# Patient Record
Sex: MALE | Race: AMERICAN INDIAN OR ALASKA NATIVE
[De-identification: names, ages, dates, MRNs, and addresses within clinical notes are randomized per-mention and may not be internally consistent; named-entity substitution may affect disease eponyms.]

---

## 2017-05-15 ENCOUNTER — HOSPITAL ENCOUNTER (INPATIENT)
Dept: HOSPITAL 42 - ED | Age: 62
LOS: 18 days | Discharge: NURSING, REHAB OR LONG-TERM CARE | DRG: 870 | End: 2017-06-02
Attending: INTERNAL MEDICINE | Admitting: INTERNAL MEDICINE
Payer: MEDICARE

## 2017-05-15 VITALS — BODY MASS INDEX: 44.9 KG/M2

## 2017-05-15 DIAGNOSIS — D68.9: ICD-10-CM

## 2017-05-15 DIAGNOSIS — Z87.442: ICD-10-CM
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
## 2017-05-15 DIAGNOSIS — J98.2: ICD-10-CM

## 2017-05-15 DIAGNOSIS — R26.2: ICD-10-CM

## 2017-05-15 DIAGNOSIS — Z99.11: ICD-10-CM

## 2017-05-15 DIAGNOSIS — Z79.4: ICD-10-CM

## 2017-05-15 DIAGNOSIS — M10.9: ICD-10-CM

## 2017-05-15 DIAGNOSIS — Z88.0: ICD-10-CM

## 2017-05-15 DIAGNOSIS — I85.00: ICD-10-CM

## 2017-05-15 DIAGNOSIS — Z82.49: ICD-10-CM

## 2017-05-15 DIAGNOSIS — R65.21: ICD-10-CM

## 2017-05-15 DIAGNOSIS — B17.9: ICD-10-CM

## 2017-05-15 DIAGNOSIS — I08.1: ICD-10-CM

## 2017-05-15 DIAGNOSIS — Z74.01: ICD-10-CM

## 2017-05-15 DIAGNOSIS — R59.0: ICD-10-CM

## 2017-05-15 DIAGNOSIS — E78.5: ICD-10-CM

## 2017-05-15 DIAGNOSIS — I13.2: ICD-10-CM

## 2017-05-15 DIAGNOSIS — N17.9: ICD-10-CM

## 2017-05-15 DIAGNOSIS — I25.10: ICD-10-CM

## 2017-05-15 DIAGNOSIS — J96.02: ICD-10-CM

## 2017-05-15 DIAGNOSIS — I73.9: ICD-10-CM

## 2017-05-15 DIAGNOSIS — F17.210: ICD-10-CM

## 2017-05-15 DIAGNOSIS — K72.00: ICD-10-CM

## 2017-05-15 DIAGNOSIS — J96.01: ICD-10-CM

## 2017-05-15 DIAGNOSIS — I27.2: ICD-10-CM

## 2017-05-15 DIAGNOSIS — J15.212: ICD-10-CM

## 2017-05-15 DIAGNOSIS — N25.81: ICD-10-CM

## 2017-05-15 DIAGNOSIS — K21.9: ICD-10-CM

## 2017-05-15 DIAGNOSIS — Z79.84: ICD-10-CM

## 2017-05-15 DIAGNOSIS — T70.29XA: ICD-10-CM

## 2017-05-15 DIAGNOSIS — K85.10: ICD-10-CM

## 2017-05-15 DIAGNOSIS — D63.1: ICD-10-CM

## 2017-05-15 DIAGNOSIS — Z99.2: ICD-10-CM

## 2017-05-15 DIAGNOSIS — G89.29: ICD-10-CM

## 2017-05-15 DIAGNOSIS — E83.52: ICD-10-CM

## 2017-05-15 DIAGNOSIS — G93.41: ICD-10-CM

## 2017-05-15 DIAGNOSIS — I50.9: ICD-10-CM

## 2017-05-15 DIAGNOSIS — K59.00: ICD-10-CM

## 2017-05-15 DIAGNOSIS — A41.9: Primary | ICD-10-CM

## 2017-05-15 DIAGNOSIS — J44.0: ICD-10-CM

## 2017-05-15 DIAGNOSIS — R00.1: ICD-10-CM

## 2017-05-15 DIAGNOSIS — E11.22: ICD-10-CM

## 2017-05-15 DIAGNOSIS — N18.6: ICD-10-CM

## 2017-05-15 DIAGNOSIS — T17.990A: ICD-10-CM

## 2017-05-15 DIAGNOSIS — E66.01: ICD-10-CM

## 2017-05-15 DIAGNOSIS — Z95.5: ICD-10-CM

## 2017-05-15 DIAGNOSIS — E11.65: ICD-10-CM

## 2017-05-15 LAB
ADD MANUAL DIFF?: YES
ALBUMIN/GLOB SERPL: 0.7 {RATIO}
ALP SERPL-CCNC: 145 U/L
ALT SERPL-CCNC: 1301 U/L
APTT BLD: 32.8 SECONDS
BILIRUB SERPL-MCNC: 2.1 MG/DL
BUN SERPL-MCNC: 31 MG/DL
CALCIUM SERPL-MCNC: 8.8 MG/DL
CHLORIDE SERPL-SCNC: 95 MMOL/L
CO2 SERPL-SCNC: 31 MMOL/L
COHGB MFR BLD: 5.5 %
ERYTHROCYTE [DISTWIDTH] IN BLOOD BY AUTOMATED COUNT: 17.7 %
GLOBULIN SER-MCNC: 5.1 GM/DL
GLUCOSE SERPL-MCNC: 122 MG/DL
HCO3 BLDA-SCNC: 26.7 MMOL/L
HCT VFR BLD CALC: 28.4 %
HHB: 5.4 %
INR PPP: 1.82
LIPASE SERPL-CCNC: 45 U/L
MCH RBC QN AUTO: 29.5 PG
MCHC RBC AUTO-ENTMCNC: 31.3 G/DL
MCV RBC AUTO: 94 FL
METHGB MFR BLD: 0.8 %
NEUTROPHILS NFR BLD AUTO: 80 %
NEUTS BAND NFR BLD: 5 %
O2 CAP BLDA-SCNC: 11.8 ML/DL
O2 CT BLDA-SCNC: 11.1 ML/DL
PH BLDA: 7.11 [PH]
PLATELET # BLD EST: NORMAL 10*3/UL
PLATELET # BLD: 122 10^3/UL
PMV BLD AUTO: 10.1 FL
PO2 BLDA: 81 MM/HG
POTASSIUM SERPL-SCNC: 5.5 MMOL/L
PROT SERPL-MCNC: 8.9 G/DL
SAO2 % BLDA: 88.3 %
SODIUM SERPL-SCNC: 138 MMOL/L
TROPONIN I SERPL-MCNC: 0.26 NG/ML
WBC # BLD AUTO: 17.1 10^3/UL

## 2017-05-15 NOTE — ED PDOC
Arrival/HPI





- General


Chief Complaint: GI Problem


Time Seen by Provider: 05/15/17 18:15


Historian: Patient





- History of Present Illness


Narrative History of Present Illness (Text): 


05/15/17 18:25


Patient had just finished dialysis and developed nausea and vomiting. He 

reports he has vomited approximately 6 times. No abdominal pain or diarrhea. No 

back pain. He states that he has some weakness and fatigue. No chest pain 

palpitations or dyspnea. No fever or chills. No injury or trauma.





Time/Duration: Prior to Arrival


Symptom Onset: Sudden


Symptom Course: Unchanged


Severity Level: Moderate


Activities at Onset: Rest





Past Medical History





- Provider Review


Nursing Documentation Reviewed: Yes





- Infectious Disease


Hx of Infectious Diseases: None





- Tetanus Immunization


Tetanus Immunization: Unknown





- Cardiac


Hx Cardiac Disorders: Yes


Hx Cardiac Arrhythmia: Yes


Hx Hypertension: Yes





- Pulmonary


Hx Respiratory Disorders: No (smokes cigarettes)


Hx Asthma: No





- Neurological


Hx Neurological Disorder: No





- HEENT


Hx HEENT Disorder: No





- Renal


Hx Renal Disorder: Yes


Hx Dialysis: Yes (MWF)


Type of Dialysis Access: L arm fistula


Date of Last Dialysis Treatment: 05/15/17


Hx Kidney Stones: Yes


Hx Neurogenic Bladder: No


Hx Pyelonephritis: No


Hx Renal Cancer: No


Hx Renal Failure: Yes





- Endocrine/Metabolic


Hx Endocrine Disorders: Yes


Hx Diabetes Mellitus Type 1: Yes





- Hematological/Oncological


Hx Blood Disorders: Yes


Hx Anemia: Yes (BLOOD TRANSFUSION)





- Integumentary


Hx Dermatological Disorder: No





- Musculoskeletal/Rheumatological


Hx Musculoskeletal Disorders: Yes


Hx Falls: Yes


Other/Comment: carpal tunnel





- Gastrointestinal


Hx Gastrointestinal Disorders: Yes


Hx Gastroesophageal Reflux: Yes





- Genitourinary/Gynecological


Hx Genitourinary Disorders: No





- Psychiatric


Hx Psychophysiologic Disorder: No


Hx Substance Use: No





- Past Surgical History


Past Surgical History: Non-Contributing





- Surgical History


Hx Cholecystectomy: No


Hx Coronary Stent: Yes (1 2017)


Hx Gastric Bypass Surgery: No


Hx Hysterectomy: No


Hx Joint Replacement: No


Hx Kidney Transplant: No


Hx Liver Transplant: No


Hx Mastectomy: No


Hx Musculoskeletal Surgery: No


Hx Open Heart Surgery: No


Hx Orthopedic Surgery: No


Hx Splenectomy: No


Hx Valve Replacement: No


Other/Comment: Dialysis shunt placement in left arm.





- Anesthesia


Hx Anesthesia: Yes


Hx Anesthesia Reactions: No


Hx Malignant Hyperthermia: No





- Suicidal Assessment


Feels Threatened In Home Enviroment: No





Family/Social History





- Physician Review


Nursing Documentation Reviewed: Yes


Family/Social History: No Known Family HX, Unknown Family HX


Smoking Status: Light Smoker < 10 Cigarettes Daily


Hx Alcohol Use: No


Hx Substance Use: No


Hx Substance Use Treatment: No





Allergies/Home Meds


Allergies/Adverse Reactions: 


Allergies





Penicillins Allergy (Verified 05/15/17 18:09)


 SWELLING








Home Medications: 


 Home Meds











 Medication  Instructions  Recorded  Confirmed


 


Enalapril Maleate [Vasotec] 20 mg PO BID 07/16/16 05/15/17


 


Mv,Devang,Min/Iron/Folic Acid/Lut 1 tab PO DAILY 07/16/16 05/15/17





[Complete Multi Tablet]   


 


Pantoprazole [Protonix EC Tab] 40 mg PO BID 07/16/16 05/15/17


 


amLODIPine [Norvasc] 10 mg PO DAILY 07/16/16 05/15/17


 


Albuterol HFA [Ventolin HFA 90 90 mcg INH PRN PRN 07/28/16 05/15/17





mcg/actuation (8 g)]   


 


Colesevelam HCl [Welchol] 27 mg PO DAILY 07/28/16 05/15/17


 


Ferric Citrate [Auryxia] 210 mg PO TID 07/28/16 05/15/17


 


Cinacalcet [Sensipar] 30 mg PO DAILY 04/21/17 05/15/17


 


Ergocalciferol (Vitamin D2) 50,000 unit PO QD7 PRN 04/21/17 05/15/17





[Vitamin D2]   


 


Insulin Glargine, Recombina 2 units SUBCUT ACBD PRN 04/21/17 05/15/17





[Lantus]   














Review of Systems





- Physician Review


All systems were reviewed & negative as marked: Yes





- Review of Systems


Constitutional: Fatigue.  absent: Fevers


Respiratory: absent: SOB, Cough, Wheezing


Cardiovascular: absent: Chest Pain, Palpitations, Syncope


Gastrointestinal: Nausea, Vomiting.  absent: Abdominal Pain, Constipation, 

Diarrhea


Genitourinary Male: Other (patient does  make some minimal urine)


Neurological: Normal





Physical Exam


Vital Signs











  Pulse Resp BP Pulse Ox


 


 05/15/17 20:49  84  18  118/65  96


 


 05/15/17 17:52  88  18  121/61  96











Appearance: Positive for: Other (morbidly obese)





- Systems Exam


Head: Present: Atraumatic, Normocephalic


Pupils: Present: PERRL


Extroacular Muscles: Present: EOMI


Conjunctiva: Present: Normal


Mouth: Present: Moist Mucous Membranes


Pharnyx: No: ERYTHEMA, EXUDATE, TONSILS ENLARGED


Neck: Present: Normal Range of Motion


Respiratory/Chest: Present: Clear to Auscultation, Good Air Exchange, Decreased 

Breath Sounds.  No: Respiratory Distress, Accessory Muscle Use


Cardiovascular: Present: Regular Rate and Rhythm, Normal S1, S2.  No: Murmurs


Abdomen: Present: Normal Bowel Sounds.  No: Tenderness, Distention, Peritoneal 

Signs, Rebound, Guarding


Back: Present: Normal Inspection.  No: CVA Tenderness, Midline Tenderness, 

Paraspinal Tenderness


Upper Extremity: Present: Normal Inspection.  No: Cyanosis, Edema


Lower Extremity: Present: Normal Inspection.  No: Edema, CALF TENDERNESS


Neurological: Present: GCS=15, CN II-XII Intact, Speech Normal, Motor Func 

Grossly Intact


Skin: Present: Warm, Dry, Normal Color.  No: Rashes


Psychiatric: Present: Alert, Oriented x 3, Normal Insight, Normal Concentration





Medical Decision Making


ED Course and Treatment: 


05/15/17 18:32


Impression:


A 61 year old male with nausea and vomiting after dialysis treatment.





Plan:


-- EKG


-- chest xray


-- labs


-- Urinalysis


-- Zofran


-- Reassess and disposition





Prior Visits:


Notes and results from previous visits were reviewed.


Patient last reported to emergency department on 4/21/17 for evaluation of low 

hemoglobin count and generalized weakness. Patient was discharged.  





Progress Notes:





05/15/17 20:24


EKG shows normal sinus rhythm rate approximately 85 with no acute ST or T-wave 

changes


05/15/17 20:25


Venous blood draw requiring M.D. skill from a right femoral vein stick on first 

attempt sterilely


05/15/17 20:26


Patient has had no vomiting while in the emergency department


05/15/17 21:34


Discussed in detail with . She will admit to telemetry. She requested 

the patient be covered with vancomycin and Flagyl. She requests a consult with 

 who has been called. Ultrasound of the abdomen has been ordered, 

but the tech has left for the evening and will not be called back in. 

Ultrasound will be done first thing in the morning.





- Lab Interpretations


Lab Results: 








 05/15/17 20:20 





 05/15/17 20:20 





 Lab Results





05/15/17 20:20: Sodium 138, Potassium 5.5 H, Chloride 95 L, Carbon Dioxide 31, 

Anion Gap 18, BUN 31 H, Creatinine 5.4 H, Est GFR ( Amer) 13, Est GFR (

Non-Af Amer) 11, Random Glucose 122 H, Calcium 8.8, Total Bilirubin 2.1 H, AST 

> 1500 H, ALT 1301 H, Alkaline Phosphatase 145 H, Lactate Dehydrogenase 40193 H

, Total Creatine Kinase 304 H, CK-MB (CK-2) 3.4, CK-MB (CK-2) % Cancelled, 

Troponin I 0.26 H* D, Total Protein 8.9 H, Albumin 3.8, Globulin 5.1, Albumin/

Globulin Ratio 0.7 L, Lipase 45


05/15/17 20:20: PT 19.7 H, INR 1.82 H, APTT 32.8 H


05/15/17 20:20: WBC 17.1 H D, RBC 3.02 L, Hgb 8.9 L, Hct 28.4 L, MCV 94.0, MCH 

29.5, MCHC 31.3, RDW 17.7 H, Plt Count 122, MPV 10.1, Neutrophils % (Manual) 

Pending, Lymphocytes % (Manual) Pending, Monocytes % (Manual) Pending








I have reviewed the lab results: Yes





- RAD Interpretation


Radiology Orders: 








05/15/17 18:23


CHEST PORTABLE [RAD] Stat 





05/15/17 21:22


ABDOMEN COMPLETE [US] Stat 














- EKG Interpretation


Interpreted by ED Physician: Yes


Type: 12 lead EKG





- Medication Orders


Current Medication Orders: 








Vancomycin HCl (Vancomycin 1gm)  1 gm in 250 mls @ 167 mls/hr IVPB STAT STA


   PRN Reason: Protocol


   Stop: 05/15/17 23:01





Discontinued Medications





Ondansetron HCl (Zofran Inj)  4 mg IVP STAT STA


   Stop: 05/15/17 18:23


   Last Admin: 05/15/17 21:12  Dose:  





Ondansetron HCl (Zofran Odt)  4 mg PO STAT STA


   Stop: 05/15/17 20:27


   Last Admin: 05/15/17 21:12  Dose: 4 mg











- Scribe Statement


The provider has reviewed the documentation as recorded by the Cayden Miles





Provider Scribe Attestation:


All medical record entries made by the Scribe were at my direction and 

personally dictated by me. I have reviewed the chart and agree that the record 

accurately reflects my personal performance of the history, physical exam, 

medical decision making, and the department course for this patient. I have 

also personally directed, reviewed, and agree with the discharge instructions 

and disposition.











Disposition/Present on Arrival





- Present on Arrival


Any Indicators Present on Arrival: No


History of DVT/PE: Yes


History of Uncontrolled Diabetes: Yes


Urinary Catheter: No


History of Decub. Ulcer: No


History Surgical Site Infection Following: None





- Disposition


Have Diagnosis and Disposition been Completed?: Yes


Diagnosis: 


 Elevated troponin, ESRD (end stage renal disease), Obesity, Nausea and vomiting

, Elevated liver enzymes





Disposition: HOSPITALIZED


Disposition Time: 21:36


Patient Plan: Admission, Telemetry


Condition: FAIR


Referrals: 


Lavonne Maria MD [Primary Care Provider] - Follow up with primary

## 2017-05-16 LAB
ABG MECHANICAL RATE: 30
ADD MANUAL DIFF?: NO
ADD MANUAL DIFF?: NO
ALBUMIN/GLOB SERPL: 0.7 {RATIO}
ALBUMIN/GLOB SERPL: 0.8 {RATIO}
ALBUMIN/GLOB SERPL: 0.8 {RATIO}
ALP SERPL-CCNC: 128 U/L
ALP SERPL-CCNC: 149 U/L
ALP SERPL-CCNC: 150 U/L
ALT SERPL-CCNC: 3914 U/L
AMORPH SED URNS QL MICRO: (no result)
APPEARANCE UR: CLEAR
ATERIAL BLOOD GAS PEEP: 10
BACTERIA #/AREA URNS HPF: (no result) /[HPF]
BASE EXCESS BLDV CALC-SCNC: 3 MMOL/L
BASOPHILS # BLD AUTO: 0.01 K/MM3
BASOPHILS # BLD AUTO: 0.02 K/MM3
BASOPHILS NFR BLD: 0.1 %
BASOPHILS NFR BLD: 0.1 %
BILIRUB SERPL-MCNC: 1.7 MG/DL
BILIRUB SERPL-MCNC: 2.1 MG/DL
BILIRUB SERPL-MCNC: 3.5 MG/DL
BILIRUB UR-MCNC: (no result) MG/DL
BUN SERPL-MCNC: 33 MG/DL
BUN SERPL-MCNC: 43 MG/DL
BUN SERPL-MCNC: 45 MG/DL
CALCIUM SERPL-MCNC: 8.5 MG/DL
CALCIUM SERPL-MCNC: 8.7 MG/DL
CALCIUM SERPL-MCNC: 8.7 MG/DL
CHLORIDE SERPL-SCNC: 93 MMOL/L
CHLORIDE SERPL-SCNC: 95 MMOL/L
CHLORIDE SERPL-SCNC: 96 MMOL/L
CO2 SERPL-SCNC: 27 MMOL/L
CO2 SERPL-SCNC: 28 MMOL/L
CO2 SERPL-SCNC: 28 MMOL/L
COHGB MFR BLD: 2.6 %
COHGB MFR BLD: 4 %
COLOR UR: YELLOW
EOSINOPHIL # BLD: 0 10*3/UL
EOSINOPHIL # BLD: 0 10*3/UL
EOSINOPHIL NFR BLD: 0 %
EOSINOPHIL NFR BLD: 0.1 %
ERYTHROCYTE [DISTWIDTH] IN BLOOD BY AUTOMATED COUNT: 17.5 %
ERYTHROCYTE [DISTWIDTH] IN BLOOD BY AUTOMATED COUNT: 17.5 %
ERYTHROCYTE [DISTWIDTH] IN BLOOD BY AUTOMATED COUNT: 17.6 %
GLOBULIN SER-MCNC: 4.6 GM/DL
GLOBULIN SER-MCNC: 4.8 GM/DL
GLOBULIN SER-MCNC: 5 GM/DL
GLUCOSE SERPL-MCNC: 147 MG/DL
GLUCOSE SERPL-MCNC: 159 MG/DL
GLUCOSE SERPL-MCNC: 182 MG/DL
GLUCOSE UR STRIP-MCNC: NEGATIVE MG/DL
GRANULOCYTES # BLD: 15.63 10*3/UL
GRANULOCYTES # BLD: 16.86 10*3/UL
GRANULOCYTES NFR BLD: 85 %
GRANULOCYTES NFR BLD: 88.6 %
HCO3 BLDA-SCNC: 26.7 MMOL/L
HCO3 BLDA-SCNC: 27.2 MMOL/L
HCO3 BLDA-SCNC: 28.5 MMOL/L
HCT VFR BLD CALC: 26.4 %
HCT VFR BLD CALC: 27.5 %
HCT VFR BLD CALC: 27.8 %
HHB: 0.3 %
HHB: 5.2 %
INR PPP: 1.94
KETONES UR STRIP-MCNC: NEGATIVE MG/DL
LEUKOCYTE ESTERASE UR-ACNC: (no result) LEU/UL
LYMPHOCYTES # BLD: 1.6 10*3/UL
LYMPHOCYTES # BLD: 1.8 10*3/UL
LYMPHOCYTES NFR BLD AUTO: 8.2 %
LYMPHOCYTES NFR BLD AUTO: 9.6 %
MAGNESIUM SERPL-MCNC: 2 MG/DL
MCH RBC QN AUTO: 29.6 PG
MCH RBC QN AUTO: 29.8 PG
MCH RBC QN AUTO: 29.9 PG
MCHC RBC AUTO-ENTMCNC: 31.8 G/DL
MCHC RBC AUTO-ENTMCNC: 32 G/DL
MCHC RBC AUTO-ENTMCNC: 32 G/DL
MCV RBC AUTO: 92.6 FL
MCV RBC AUTO: 93 FL
MCV RBC AUTO: 94 FL
METHGB MFR BLD: 0.9 %
METHGB MFR BLD: 1.5 %
MONOCYTES # BLD AUTO: 0.6 10*3/UL
MONOCYTES # BLD AUTO: 1 10*3/UL
MONOCYTES NFR BLD: 3.1 %
MONOCYTES NFR BLD: 5.2 %
O2 CAP BLDA-SCNC: 11.9 ML/DL
O2 CAP BLDA-SCNC: 13.1 ML/DL
O2 CT BLDA-SCNC: 11.2 ML/DL
O2 CT BLDA-SCNC: 13.1 ML/DL
PH BLDA: 7.16 [PH]
PH BLDA: 7.3 [PH]
PH BLDA: 7.39 [PH]
PH BLDV: 7.4 [PH]
PH UR STRIP: 5.5 [PH]
PHOSPHATE SERPL-MCNC: 6.1 MG/DL
PLATELET # BLD: 120 10^3/UL
PLATELET # BLD: 122 10^3/UL
PLATELET # BLD: 137 10^3/UL
PMV BLD AUTO: 9.4 FL
PMV BLD AUTO: 9.5 FL
PMV BLD AUTO: 9.6 FL
PO2 BLDA: 234 MM/HG
PO2 BLDA: 76 MM/HG
PO2 BLDA: 85 MM/HG
POTASSIUM SERPL-SCNC: 4.5 MMOL/L
POTASSIUM SERPL-SCNC: 5.1 MMOL/L
POTASSIUM SERPL-SCNC: 5.2 MMOL/L
PROT SERPL-MCNC: 8.2 G/DL
PROT SERPL-MCNC: 8.3 G/DL
PROT SERPL-MCNC: 8.9 G/DL
PROT UR STRIP-MCNC: >=300 MG/DL
RBC # UR STRIP: (no result) /UL
RBC #/AREA URNS HPF: (no result) /HPF
SAO2 % BLDA: 89.9 %
SAO2 % BLDA: 95.6 %
SODIUM SERPL-SCNC: 135 MMOL/L
SODIUM SERPL-SCNC: 137 MMOL/L
SODIUM SERPL-SCNC: 139 MMOL/L
SP GR UR STRIP: 1.02
TROPONIN I SERPL-MCNC: 0.75 NG/ML
UROBILINOGEN UR STRIP-ACNC: 0.2 E.U./DL
WBC # BLD AUTO: 18.4 10^3/UL
WBC # BLD AUTO: 19 10^3/UL
WBC # BLD AUTO: 20.5 10^3/UL

## 2017-05-16 PROCEDURE — 0BH17EZ INSERTION OF ENDOTRACHEAL AIRWAY INTO TRACHEA, VIA NATURAL OR ARTIFICIAL OPENING: ICD-10-PCS | Performed by: INTERNAL MEDICINE

## 2017-05-16 PROCEDURE — 5A1955Z RESPIRATORY VENTILATION, GREATER THAN 96 CONSECUTIVE HOURS: ICD-10-PCS | Performed by: INTERNAL MEDICINE

## 2017-05-16 PROCEDURE — 3E0F7GC INTRODUCTION OF OTHER THERAPEUTIC SUBSTANCE INTO RESPIRATORY TRACT, VIA NATURAL OR ARTIFICIAL OPENING: ICD-10-PCS | Performed by: INTERNAL MEDICINE

## 2017-05-16 NOTE — CP.PCM.CON
<Shin Mohamud - Last Filed: 05/16/17 01:07>





History of Present Illness





- History of Present Illness


History of Present Illness: 





60 y/o M with PMH of HTN, IDDM,, ESRD on dialysis, and HLD presents to the ED 

for nausea and vomiting for the past day. History was provided by previous 

records, friend at bedside, and significant other via telephone as the patient 

was too lethargic to effectively communicate. According to significant other, 

pt had been experiencing flu-like symptoms over the past week. He recently went 

to see his PMD and received a Z-pack which he started 3 days ago. Over the 

weekend, pt went to [facility]. This morning, he began experiencing nonbloody, 

nonbilious episodes of vomiting. Pt began to develop fever and chills, along 

with lethargy. At this point, an ambulance was called for the patient. At 

bedside, pt is accompanied by his friend who also mentions that pt underwent 

dialysis and had between 4-5 liters removed. 





PMH: HTN, IDDM,, ESRD on dialysis, and HLD


Surgical Hx: Carpal tunnel release, AV fistula


Social Hx: Former tobacco use, 1/2 ppd. Occasional alcohol use, no illicit drug 

use


Allergies: Penicillin


Medications: See MAR





Review of Systems





- Review of Systems


Systems not reviewed;Unavailable: Altered Mental Status





Past Patient History





- Infectious Disease


Hx of Infectious Diseases: None





- Tetanus Immunizations


Tetanus Immunization: Unknown





- Past Social History


Smoking Status: Light Smoker < 10 Cigarettes Daily





- CARDIAC


Hx Cardiac Disorders: Yes


Hx Cardia Arrhythmia: Yes


Hx Hypertension: Yes





- PULMONARY


Hx Respiratory Disorders: No (smokes cigarettes)


Hx Asthma: No





- NEUROLOGICAL


Hx Neurological Disorder: No





- HEENT


Hx HEENT Problems: No





- RENAL


Hx Chronic Kidney Disease: Yes


Hx Dialysis: Yes (MWF)


Type of Dialysis Access: L arm fistula


Date of Last Dialysis Treatment: 05/15/17


Hx Kidney Stones: Yes


Hx Neurogenic Bladder: No


Hx Pyelonephritis: No


Hx Renal (Kidney) Cancer: No


Hx Renal Failure: Yes





- ENDOCRINE/METABOLIC


Hx Endocrine Disorders: Yes


Hx Diabetes Mellitus Type 1: Yes





- HEMATOLOGICAL/ONCOLOGICAL


Hx Blood Disorders: Yes


Hx Anemia: Yes (BLOOD TRANSFUSION)





- INTEGUMENTARY


Hx Dermatological Problems: No





- MUSCULOSKELETAL/RHEUMATOLOGICAL


Hx Musculoskeletal Disorders: Yes


Hx Falls: Yes


Other/Comment: carpal tunnel





- GASTROINTESTINAL


Hx Gastrointestinal Disorders: Yes


Hx Gastroesophageal Reflux: Yes





- GENITOURINARY/GYNECOLOGICAL


Hx Genitourinary Disorders: No





- PSYCHIATRIC


Hx Psychophysiologic Disorder: No


Hx Substance Use: No





- SURGICAL HISTORY


Hx Cholecystectomy: No


Hx Coronary Stent: Yes (1 2017)


Hx Gastric Bypass Surgery: No


Hx Hysterectomy: No


Hx Joint Replacement: No


Hx Kidney Transplant: No


Hx Liver Transplant: No


Hx Mastectomy: No


Hx Musculoskeletal Surgery: No


Hx Open Heart Surgery: No


Hx Orthopedic Surgery: No


Hx Splenectomy: No


Hx Valve Replacement: No


Other/Comment: Dialysis shunt placement in left arm.





- ANESTHESIA


Hx Anesthesia: Yes


Hx Anesthesia Reactions: No


Hx Malignant Hyperthermia: No





Meds


Allergies/Adverse Reactions: 


 Allergies











Allergy/AdvReac Type Severity Reaction Status Date / Time


 


Penicillins Allergy  SWELLING Verified 05/15/17 18:09














- Medications


Medications: 


 Current Medications





Acetaminophen (Tylenol 325mg Tab)  650 mg PO Q6H PRN


   PRN Reason: Fever >100.4 F


Albuterol/Ipratropium (Duoneb 3 Mg/0.5 Mg (3 Ml) Ud)  3 ml IH Q2H PRN


   PRN Reason: Shortness of Breath


Albuterol/Ipratropium (Duoneb 3 Mg/0.5 Mg (3 Ml) Ud)  3 ml IH [id number] OSEAS


Furosemide (Lasix)  40 mg IVP STAT STA


   Stop: 05/16/17 00:34


Hydralazine HCl (Apresoline)  10 mg IVP Q6 OSEAS


Metronidazole (Flagyl)  500 mg in 100 mls @ 100 mls/hr IVPB Q8 OSEAS


   PRN Reason: Protocol


Levofloxacin/Dextrose (Levaquin 750mg)  750 mg in 150 mls @ 100 mls/hr IVPB 

STAT STA


   Stop: 05/16/17 01:43


Meropenem 250 mg/ Sodium (Chloride)  100 mls @ 100 mls/hr IVPB Q12H OSEAS


   PRN Reason: Protocol


   Stop: 05/16/17 01:14


Insulin Human Regular (Humulin R Med)  0 units SC ACHS OSEAS


   PRN Reason: Protocol


Ondansetron HCl (Zofran Inj)  4 mg IVP Q6H PRN


   PRN Reason: Nausea/Vomiting


Pantoprazole Sodium (Protonix Inj)  40 mg IVP DAILY OSEAS











Physical Exam





- Constitutional


Appears: Toxic, In Acute Distress





- Head Exam


Head Exam: ATRAUMATIC, NORMAL INSPECTION, NORMOCEPHALIC





- Eye Exam


Eye Exam: EOMI, Normal appearance





- ENT Exam


ENT Exam: Mucous Membranes Dry





- Neck Exam


Neck exam: Positive for: Normal Inspection.  Negative for: Lymphadenopathy





- Respiratory Exam


Respiratory Exam: Decreased Breath Sounds, Respiratory Distress.  absent: 

Wheezes





- Cardiovascular Exam


Cardiovascular Exam: RRR, +S1, +S2





- GI/Abdominal Exam


GI & Abdominal Exam: Normal Bowel Sounds, Soft.  absent: Tenderness





- Extremities Exam


Extremities exam: Positive for: pedal edema (+1 edema b/l).  Negative for: calf 

tenderness





- Neurological Exam


Neurological exam: Alert, CN II-XII Intact, Oriented x3





- Psychiatric Exam


Psychiatric exam: Normal Affect, Normal Mood





- Skin


Skin Exam: Dry, Intact, Warm





Results





- Vital Signs


Recent Vital Signs: 


 Last Vital Signs











Temp  99.4 F   05/15/17 22:40


 


Pulse  86   05/16/17 00:15


 


Resp  15   05/16/17 00:15


 


BP  150/70   05/16/17 00:15


 


Pulse Ox  92 L  05/16/17 00:15














- Labs


Result Diagrams: 


 05/15/17 20:20





 05/15/17 20:20


Labs: 


 Laboratory Results - last 24 hr











  05/15/17 05/15/17





  22:30 23:15


 


pCO2   84 H*


 


pO2   81.0


 


HCO3   26.7


 


ABG pH   7.11 L*


 


ABG Total CO2   29.3 H


 


ABG O2 Saturation   94.2 L


 


ABG O2 Content   11.1 L


 


ABG Base Excess   -3.5 L


 


ABG Hemoglobin   8.8 L


 


ABG Carboxyhemoglobin   5.5 H


 


POC ABG HHb (Measured)   5.4 H


 


ABG Methemoglobin   0.8


 


ABG O2 Capacity   11.8 L


 


Hgb O2 Saturation   88.3 L


 


FiO2   100.0


 


Troponin I  0.35 H* D 














Assessment & Plan





- Assessment and Plan (Free Text)


Plan: 





60 y/o M with PMH of HTN, IDDM,, ESRD on dialysis, and HLD presents with 

hypercapnic, hypoxemic respiratory failure in setting of multiorgan dysfunction 

syndrome. Pt is found to have multiple lab abnormalities including elevated 

troponins, elevated LFTs, and chronic anemia. Pt was intubated in the ED due to 

increased lethargy and inability to protect his airway. Pt will be admitted to 

the ICU. 





Neuro: 


Obtunded, intubated


Ammonia levels


Will monitor neuro status for acute worsening





Cardio:


Will obtain BNP


Trend troponins, elevated at this time likely secondary to ESRD


Lasix 40 mg IV x 1 given to help with potential fluid overload


Hydralazine prn for BP, Goal SBP <180


Recent cardiac cath shown to have EF of 65%, nonocclusive


Maintain MAP >65


Cardiology consult, Dr. Thompson





Pulm:


Hypercapnic, hypoxemic respiratory failure


Intubated, PRVC


Will obtain repeat CXR after intubation


Will obtain ABG after intubation


Duonebs scheduled and prn


Solumedrol 40 mg IV q12


Will cover CAP with 1 dose of levaquin and Merrem scheduled


Will obtain CT chest to evaluate for possible infiltrate 


Maintain O2 sat >92%





GI:


Will obtain noncontrast CT of abdomen


Increased LFTs, possible shock liver 


Flagyl, Merrem, and 1 dose of levaquin


Protonix


GI consult, Dr. Morales





Nephro:


Received dialysis today


Hx of ESRD, creatinine near baseline


Replenish electrolytes as needed


Maintain euvolemia 


Consider nephrology consult





Heme/ID:


Multiorgan dysfunction syndrome


Afebrile, Leukocytosis


CBC daily


Blood and urine cultures pending


Continue abx


Maintain normothermia





Endo: 


ISS


Fingersticks q4h


TSH





PPX:


Protonix


Heparin





Seen, reviewed, and discussed with attending





Cade, PGY-1








<Cedric Beard Q - Last Filed: 05/16/17 07:00>





Meds





- Medications


Medications: 


 Current Medications





Acetaminophen (Tylenol 325mg Tab)  650 mg PO Q6H PRN


   PRN Reason: Fever >100.4 F


Albuterol/Ipratropium (Duoneb 3 Mg/0.5 Mg (3 Ml) Ud)  3 ml IH Q2H PRN


   PRN Reason: Shortness of Breath


Albuterol/Ipratropium (Duoneb 3 Mg/0.5 Mg (3 Ml) Ud)  3 ml IH [id number] OSEAS


Heparin Sodium (Porcine) (Heparin)  5,000 units SC Q12 OSEAS


   PRN Reason: Protocol


Hydralazine HCl (Apresoline)  10 mg IVP Q6 PRN


   PRN Reason: SBP >180


Metronidazole (Flagyl)  500 mg in 100 mls @ 100 mls/hr IVPB Q8 OSEAS


   PRN Reason: Protocol


Propofol (Diprivan)  1,000 mg in 100 mls @ 3.674 mls/hr IV .Q24H PRN; Protocol; 

5 MCG/KG/MIN


   PRN Reason: TITRATE PER MD ORDER


   Last Titration: 05/16/17 03:35 Dose:  20 mcg/kg/min, 14.696 mls/hr


Meropenem 500 mg/ Sodium (Chloride)  100 mls @ 100 mls/hr IVPB Q12 [facility]


   Stop: 05/23/17 06:23


Insulin Human Regular (Humulin R Med)  0 units SC ACHS OSEAS


   PRN Reason: Protocol


Methylprednisolone (Solu-Medrol)  40 mg IVP Q12 [facility]


   Last Admin: 05/16/17 06:49 Dose:  40 mg


Ondansetron HCl (Zofran Inj)  4 mg IVP Q6H PRN


   PRN Reason: Nausea/Vomiting


Pantoprazole Sodium (Protonix Inj)  40 mg IVP DAILY [facility]











Results





- Vital Signs


Recent Vital Signs: 


 Last Vital Signs











Temp  99.4 F   05/15/17 22:40


 


Pulse  77   05/16/17 05:08


 


Resp  14   05/16/17 05:08


 


BP  159/76 H  05/16/17 05:08


 


Pulse Ox  100   05/16/17 05:08














- Labs


Result Diagrams: 


 05/15/17 20:20





 05/15/17 20:20


Labs: 


 Laboratory Results - last 24 hr











  05/15/17 05/15/17 05/16/17





  22:30 23:15 01:42


 


pCO2   84 H* 


 


pO2   81.0 


 


HCO3   26.7 


 


ABG pH   7.11 L* 


 


ABG Total CO2   29.3 H 


 


ABG O2 Saturation   94.2 L 


 


ABG O2 Content   11.1 L 


 


ABG Base Excess   -3.5 L 


 


ABG Hemoglobin   8.8 L 


 


ABG Carboxyhemoglobin   5.5 H 


 


POC ABG HHb (Measured)   5.4 H 


 


ABG Methemoglobin   0.8 


 


ABG O2 Capacity   11.8 L 


 


Hgb O2 Saturation   88.3 L 


 


FiO2   100.0 


 


Ammonia    63 H


 


Troponin I  0.35 H* D  


 


Influenza Typ A,B (EIA)   














  05/16/17 05/16/17





  03:16 04:11


 


pCO2  75 H* 


 


pO2  76.0 L 


 


HCO3  26.7 


 


ABG pH  7.16 L* 


 


ABG Total CO2  29.0 H 


 


ABG O2 Saturation  94.5 L 


 


ABG O2 Content  11.2 L 


 


ABG Base Excess  -2.6 L 


 


ABG Hemoglobin  8.8 L 


 


ABG Carboxyhemoglobin  4.0 H 


 


POC ABG HHb (Measured)  5.2 H 


 


ABG Methemoglobin  0.9 


 


ABG O2 Capacity  11.9 L 


 


Hgb O2 Saturation  89.9 L 


 


FiO2  100.0 


 


Ammonia  


 


Troponin I  


 


Influenza Typ A,B (EIA)   Negative for flu a/b














Attending/Attestation





- Attestation


I have personally seen and examined this patient.: Yes


I have fully participated in the care of the patient.: Yes


I have reviewed all pertinent clinical information: Yes


Notes (Text): 





05/16/17 06:56


I agree with the above mentioned note by Dr. Mohamud with the following 

additions/exceptions noted:


60 y/o male with PMHx as described above came to the ED after experiencing cold-

like symptoms over the past week.  He used a zpack for about 3-4 days without 

any improvement.  Over the past 1 day, as per his girlfriend, the patient began 

having multiple bouts of NBNB emesis, before and continuing after HD.  Patient 

came to the ED and was obtunded; not a candidate for NIPPV due to his altered 

mentation and intractable vomiting.  He was admitted to the ICU for further 

care and monitoring and remained in the ED overnight due to a lack of beds/

nurses in the ICU.


Case discussed with Dr. Berry who intubated the patient after it was handed 

over to him by an earlier ED Physician


labs and images reviewed personally


total time of care: 45 minutes

## 2017-05-16 NOTE — CT
PROCEDURE:  CT HEAD WITHOUT CONTRAST.



HISTORY:

altered mental status



COMPARISON:

None available. 



TECHNIQUE:

Axial computed tomography images were obtained through the head/brain 

without intravenous contrast.  



Radiation dose:



Total exam DLP = 789.92 mGy-cm.



This CT exam was performed using one or more of the following dose 

reduction techniques: Automated exposure control, adjustment of the 

mA and/or kV according to patient size, and/or use of iterative 

reconstruction technique.



FINDINGS:



HEMORRHAGE:

No intracranial hemorrhage. 



BRAIN:

No mass effect or edema.  No atrophy or chronic microvascular 

ischemic changes.



VENTRICLES:

Unremarkable. No hydrocephalus. 



CALVARIUM:

Unremarkable.



PARANASAL SINUSES:

Chronic sphenoid, ethmoid and bilateral maxillary sinusitis. 

Dependent fluid with air-fluid level noted in sphenoid and right 

frontal sinus. Possible acute sinusitis. Please correlate. 



MASTOID AIR CELLS:

Unremarkable as visualized. No inflammatory changes.



OTHER FINDINGS:

None.



IMPRESSION:

No intracranial mass, hemorrhage or evidence of acute infarct.  

Chronic paranasal sinusitis.  Dependent fluid in sphenoid and right 

frontal sinus.  Rule out acute sinusitis.

## 2017-05-16 NOTE — PN
DATE: 05/16/2017



The patient is a 61-year-old.  Last night events noted.  After patient was admitted, initially he bec
neno more confused and disoriented.  He was found to be in CO2 narcosis.  He was then intubated.  He h
ad CT scan of the abdomen and pelvis done that showed extensive bilateral pulmonary infiltrate, cardi
omegaly and mediastinal lymphadenopathy.  He was also found to have cholelithiasis, but no evidence o
f cholecystitis.  The patient was admitted in ICU.  When I saw patient this morning, he was intubated
 and sedated.  Case discussed with the intensivist and patient's nurse, _____, taking care of him.



PHYSICAL EXAMINATION:

GENERAL:  He is on a vent and sedated.

VITAL SIGNS:  He is afebrile, pulse 91, blood pressure 150/60.

LUNGS:  Bilateral soft crackle.

HEART:  S1, S2 audible.

ABDOMEN:  Soft, obese, nontender, no rebound, no guarding.

NEUROLOGIC:  The patient is sedated on vent.



LABORATORY EXAMINATION:  WBCs 19.0, hemoglobin 8.9, hematocrit 27.8, platelets 120.  His PT is 20.9, 
INR 1.94.  Chemistry:  Sodium 137, potassium 5.1, chloride 96, CO2 27, BUN 43, creatinine 6.6, blood 
sugar of 159.  His total bilirubin is 2.1, his AST is 7037 and ALT 3903, alk phos is normal.  His tro
ponin is 0.61.



X-ray chest:  Improved bilateral infiltrate.



ASSESSMENT:

1.  Sepsis.

2.  Hepatic failure.

3.  End-stage renal disease, on hemodialysis.

4.  Bilateral alveolar infiltrate.

5.  Cholelithiasis.



PLAN:  Currently, patient is on IV antibiotics including metronidazole and meropenem.  He was given d
ose of vancomycin.  We will continue him on PPI.  Discussed with Dr. Chandra, who will discuss with 
the patient's family.  We are trying to transfer him to tertiary care unit and that could be [facility] or 
[facility].  Once a bed is available, he will be transferred _____.





__________________________________________

Antonieta Rae MD







cc:



DD: 05/16/2017 12:26:40  [id number]

TT: 05/16/2017 15:16:03

Confirmation # [id number]

Dictation # [id number]

en

## 2017-05-16 NOTE — RAD
HISTORY:

vomiting  



COMPARISON:

No prior.



FINDINGS:



BOWEL:

Normal. No obstruction. No free air. Inferior vena caval filter noted.



BONES:

Normal.



OTHER FINDINGS:

None.



IMPRESSION:

No active disease.

## 2017-05-16 NOTE — RAD
HISTORY:

cp  



COMPARISON:

05/15/2017 



FINDINGS:



LUNGS:

The endotracheal tube is just above the quincy.



There is a new bilateral alveolar infiltrate. This most likely 

represents pulmonary edema.



PLEURA:

No significant pleural effusion identified, no pneumothorax apparent.



CARDIOVASCULAR:

Normal.



OSSEOUS STRUCTURES:

No significant abnormalities.



VISUALIZED UPPER ABDOMEN:

Normal.



OTHER FINDINGS:

None.



IMPRESSION:

New bilateral alveolar infiltrates probable pulmonary edema

## 2017-05-16 NOTE — CON
DATE: 05/16/2017



The patient admitted for Dr. Rae.



REFERRING PHYSICIAN:  Dr. Rae.



REASON FOR CONSULTATION:  To provide dialysis services for a patient admitted with acute fulminant he
patitis and respiratory failure, now intubated in the CCU.



HISTORY OF PRESENT ILLNESS:  The patient is a 61-year-old black male with a history of end-stage dejon
l disease on chronic maintenance hemodialysis Monday, Wednesday, Friday in [facility], hist
ory of NIDDM, hypertension, history of nonobstructive coronary artery disease, status post cardiac ca
theterization 1/2017, history of LVH, history of valvular heart disease, MR/TR, history of gastroesop
hageal reflux disease, history of anemia secondary to chronic kidney disease, history of secondary hy
perparathyroidism, history of nephrolithiasis, history of obesity and history of gout.  The patient p
resented to the Emergency Room with nausea, vomiting, and abdominal pain.  En route to CAT scan, the 
patient developed acute respiratory distress and was intubated.  The patient is currently seen in CCU
, bed 6.  He is sedated on Diprivan.  He is on a ventilator.  He has acute fulminant hepatitis.  His 
chest imaging study showed bilateral infiltrates.  It is uncertain whether he is in CHF or he has PRATIK
S.  Workup of his elevated LFTs are in process.  The patient will likely be transferred to [facility] for further evaluation.  The patient's last dialysis was yesterday.  Plan is to dialyze ashley
ent today prior to transfer to [facility].



PAST MEDICAL HISTORY:  Significant for end-stage renal disease, NIDDM, hypertension, nonobstructive c
oronary artery disease, LVH, MR/TR, GERD, anemia secondary to chronic kidney disease, secondary hyper
parathyroidism, kidney stones, obesity and gout.



MEDICATIONS:  At home include that of Norvasc, Protonix, multivite, sliding scale regular insulin, Au
ryxia, vitamin D, Vasotec, Welchol, Sensipar, and albuterol.



ALLERGIES:  THE PATIENT IS ALLERGIC TO PENICILLIN.



MEDICATIONS:  Presently in hospital include that of acetylcysteine, hydralazine, Diprivan, DuoNeb, En
ulose, Flagyl, heparin, sliding scale insulin, meropenem, Protonix, Xifaxan and Zofran p.r.n.



SOCIAL HISTORY:  As per old charts, patient continues to smoke cigarettes.  The patient does not use 
alcohol.



FAMILY HISTORY:  From old charts.  No history of diabetes, hypertension, kidney disease or heart dise
ase.



REVIEW OF SYSTEMS:  Unobtainable as patient is intubated.



PHYSICAL EXAMINATION:

GENERAL:  The patient is seen in CCU, bed 6.  He is currently on a ventilator.

VITAL SIGNS:  Blood pressure presently is 165/73.  Heart rate is 91.  Oxygen saturation is 93%.  Resp
iratory rate is 22.

HEENT:  The patient is intubated.  Eyes are closed.  NG tube in place.

NECK:  No neck vein distention.

CHEST:  Decreased breath sounds at the bases with scattered rales and rhonchi bilaterally.  No wheezi
ng.

CARDIOVASCULAR:  Regular rate and rhythm with MR/TR.  No S3, no S4.  No rub.

ABDOMEN:  Soft.  No rigidity.  No apparent rebound or guarding.  No appreciable organomegaly.  Modera
te obesity.  Moderate distention.

EXTREMITIES:  No lower extremity edema, but legs are puffy.  Left upper extremity AV fistula, positiv
e thrill, positive bruit.

NEUROLOGIC:  Difficult to assess as patient is sedated on a ventilator.



LABORATORY DATA AND IMAGING:  Admitting chest x-ray showed bilateral infiltrates.  Follow up chest x-
ray showed improvement in the infiltrates.  Abdominal and chest CT scan showed bilateral infiltrates,
 cardiomegaly, gallstones without cholecystitis, mild hepatosplenomegaly.



CBC:  White blood cell count on admission 19,000, today 17.1, hemoglobin 8.9.  Platelet count is 122,
000.  Coags show a PT of 20.9 with an INR of 1.94 and a PTT of 32.8.  Last blood gas, pH 7.40, pCO2 4
6 with a pO2 of 188.  Oxygen saturation is 93%.  Chemistries today, sodium 137, potassium 5.1, chlori
de 96, BUN 43 with a creatinine of 6.6.  Glucose is 159.  Bilirubin is 2.1. AST elevated at 7037, ALT
 elevated at 3903.  Alkaline phosphatase mildly elevated at 150.  Troponins are elevated at 0.61 and 
0.75.  Lipase is normal.  Ammonia level is elevated at 63.  TSH is 0.05.  PSA is 0.5.  Toxicology scr
een positive for urine opiates.  Acetaminophen level is less than 10.  Alcohol level was less than 10
.  Benzodiazepines are positive.  Serologies: Hepatitis A, B and C antibodies are pending.  Hepatitis
 B surface antigen is negative.  Influenza A and B are negative.  Urines are unremarkable with the ex
ception of proteinuria.  Microbiology: All cultures are pending.



ASSESSMENT:

1.  End-stage renal disease.  The patient's last dialysis was on 5/15/2017.  The patient will receive
 another dialysis treatment today.  Perhaps some component of his bilateral interstitial infiltrates 
on chest CT and chest x-ray are volume related.  The patient's potassium level is acceptable.

2.  Acute respiratory failure.  Possible adult respiratory distress syndrome, possible congestive hea
rt failure with pulmonary edema.  Again, dialysis will be done today.  This has been discussed with otoniel pereira intensivist.

3.  Elevated liver enzymes, transaminitis.  Exact etiology pending.  No evidence for obstruction, mil
d hepatomegaly.  Complete hepatitis viral titers are pending.  Mild elevation of ammonia.  The patien
t is being appropriately treated.  In light of his fulminant hepatitis, patient is likely being trans
ferred [facility] later today.

4.  Elevated cardiac enzymes.  Troponin likely elevated secondary to kidney failure.  The patient had
 nonobstructive coronary artery disease on catheterization done in 1/2017.  Positive left ventricular
 hypertrophy, positive mitral regurg/tricuspid regurg with cardiomegaly seen on chest x-ray.

5.  History of anemia.  Hemoglobin is acceptable and within his range.  The patient should continue r
eceiving Aranesp with dialysis and to be transfused on a p.r.n. basis.  Anemia is likely secondary to
 chronic kidney disease.

6.  History of secondary hyperparathyroidism.  The patient's phosphorus level was 6.1 with a calcium 
level of 8.5.  Once patient starts to nutrition, binder should be restarted.  The patient was on Stephanie
rosario.



PLAN:

1.  Plan for short dialysis today.  We will try and keep patient as euvolemic as possible in as much 
as patient makes small amounts of urine.

2.  Agree with transfer to a tertiary facility in light of his elevated liver enzymes and fulminant h
epatitis.

3.  Discussed with cardiology and intensivist and CCU staff in detail.

4.  Continue empiric antibiotic therapy.  Check cultures.

5.  Continue treatment for his elevated ammonia levels.

6.  Close renal followup as noted above.



Greater than 35 minutes spent in the care of this critically ill patient.



Thank you for letting me partake and share in the care of our mutual patient.





__________________________________________

Martin Hardy MD







cc:



DD: 05/16/2017 12:32:47  434

TT: 05/16/2017 13:44:32

Confirmation # [id number]

Dictation # [id number]

rn

## 2017-05-16 NOTE — CARD
--------------- APPROVED REPORT --------------





EKG Measurement

Heart [id number]

OH [id number]

[id number]

[id number]

[id number]



<Conclusion>

Normal sinus rhythm

Prolonged QT

Abnormal ECG

## 2017-05-16 NOTE — RAD
HISTORY:

placement ngt  



COMPARISON:

5/16/2017 at 20:07 a.m. 



FINDINGS:



LUNGS:

Marked interval improvement in the extent of bilateral upper lobe and 

perihilar infiltrate. There is persistent but decreased upper lobe 

infiltrate.  There is left retrocardiac infiltrate with air 

bronchograms noted.



PLEURA:

No significant pleural effusion identified, no pneumothorax apparent.



CARDIOVASCULAR:

ET tube grossly unchanged.  NG tube has been placed extending to the 

left upper quadrant of the abdomen.



OSSEOUS STRUCTURES:

No significant abnormalities.



VISUALIZED UPPER ABDOMEN:

Normal.



OTHER FINDINGS:

None.



IMPRESSION:

Improved bilateral infiltrate.  New NG tube appropriately positioned. 

 ET tube unchanged.

## 2017-05-16 NOTE — ED PDOC
Physical Exam


Vital Signs Reviewed: Yes


Vital Signs











  Temp Pulse Resp BP Pulse Ox


 


 05/16/17 05:08   77  14  159/76 H  100


 


 05/16/17 04:23   78  15  125/71  99


 


 05/16/17 03:59   76  15  153/70 H  100


 


 05/16/17 03:53   76  22  137/66  100


 


 05/16/17 03:36   78  14  137/66  100


 


 05/16/17 02:59   80  15  145/67  100


 


 05/16/17 01:33   96 H   136/95 H  94 L


 


 05/16/17 00:15   86  15  150/70  92 L


 


 05/15/17 22:40  99.4 F     64 L


 


 05/15/17 22:39   85  15  155/78 H  96


 


 05/15/17 20:49   84  18  118/65  96


 


 05/15/17 17:52   88  18  121/61  96











Temperature: Afebrile


Blood Pressure: Normal


Pulse: Regular


Respiratory Rate: Normal


Appearance: Positive for: Non-Toxic


Pain Distress: None





Medical Decision Making


ED Course and Treatment: 





05/15/17 23:00


Patient evaluated by  and was admitted to telemetry under Dr. Rae

's service. 











05/16/17 01:27


Upon reevaluation by nurse, patient became more lethargic. Ordered ABG which 

showed CO2 retention. Case discussed with  who was updated with the 

details. Advices to place patient in ICU. Discussed with Dr. Beard, intensivist, 

who accepts patient to ICU. Surgical resident Dr.Hae Tirado consulted for elevated 

LFTs. Will intubate patient due to hypercapnia and inability to protect airway. 








PROCEDURE: INTUBATION


Performed by the emergency provider


Time: 01:15


Consent: Discussion of the risks, benefits, and alternatives to the procedure, 

along with informed


consent was precluded by the urgency of the procedure and the patient condition.


Medications: 10mg Etomidate 


ETT Size: 8 gauge


Confirmation: Cords directly visualized as tube passed, good bilateral breath 

sounds, positive CO2


detector color change, tube fogging, adequate chest rise, improving pulse 

oximetry reading, improved


skin color, and absence of gastric sounds.


ETT Secured: The cuff was inflated and the tube was secured appropriately at a 

distance of 24 cm at


the lip.


Post-Procedure: There were no immediate complications.


CXR Confirmation: Yes














- Critical Care


Critical Care Minutes: 60 minutes


Narrative Critical Care (Text): 








Patient lethargic and hypercapnic, had to be intubated. 








- Lab Interpretations


Lab Results: 








 05/15/17 20:20 





 05/15/17 20:20 





 Lab Results





05/15/17 20:20: TSH 3rd Generation 0.05 L


05/15/17 20:20: NT-Pro-B Natriuret Pep 79504 H


05/15/17 20:20: Sodium 138, Potassium 5.5 H, Chloride 95 L, Carbon Dioxide 31, 

Anion Gap 18, BUN 31 H, Creatinine 5.4 H, Est GFR ( Amer) 13, Est GFR (

Non-Af Amer) 11, Random Glucose 122 H, Calcium 8.8, Total Bilirubin 2.1 H, AST 

> 1500 H, ALT 1301 H, Alkaline Phosphatase 145 H, Lactate Dehydrogenase 09466 H

, Total Creatine Kinase 304 H, CK-MB (CK-2) 3.4, CK-MB (CK-2) % Cancelled, 

Troponin I 0.26 H* D, Total Protein 8.9 H, Albumin 3.8, Globulin 5.1, Albumin/

Globulin Ratio 0.7 L, Lipase 45


05/15/17 20:20: PT 19.7 H, INR 1.82 H, APTT 32.8 H


05/15/17 20:20: WBC 17.1 H D, RBC 3.02 L, Hgb 8.9 L, Hct 28.4 L, MCV 94.0, MCH 

29.5, MCHC 31.3, RDW 17.7 H, Plt Count 122, MPV 10.1, Neutrophils % (Manual) 80 

H, Band Neutrophils % 5 H, Lymphocytes % (Manual) 13 L, Monocytes % (Manual) 2, 

Platelet Evaluation Normal








I have reviewed the lab results: Yes





- RAD Interpretation


Radiology Orders: 








05/15/17 18:23


CHEST PORTABLE [RAD] Stat 





05/15/17 21:22


ABDOMEN COMPLETE [US] Stat 














- Medication Orders


Current Medication Orders: 








Acetaminophen (Tylenol 325mg Tab)  650 mg PO Q6H PRN


   PRN Reason: Fever >100.4 F


Albuterol/Ipratropium (Duoneb 3 Mg/0.5 Mg (3 Ml) Ud)  3 ml IH Q2H PRN


   PRN Reason: Shortness of Breath


Albuterol/Ipratropium (Duoneb 3 Mg/0.5 Mg (3 Ml) Ud)  3 ml IH [id number] OSEAS


Heparin Sodium (Porcine) (Heparin)  5,000 units SC Q12 OSE[facility]


   PRN Reason: Protocol


Hydralazine HCl (Apresoline)  10 mg IVP Q6 PRN


   PRN Reason: SBP >180


Metronidazole (Flagyl)  500 mg in 100 mls @ 100 mls/hr IVPB Q8 O[facility]


   PRN Reason: Protocol


Propofol (Diprivan)  1,000 mg in 100 mls @ 3.674 mls/hr IV .Q24H PRN; Protocol; 

5 MCG/KG/MIN


   PRN Reason: TITRATE PER MD ORDER


   Last Titration: 05/16/17 03:35  Dose: 20 mcg/kg/min, 14.696 mls/hr





Insulin Human Regular (Humulin R Med)  0 units SC ACHS OSEAS


   PRN Reason: Protocol


Methylprednisolone (Solu-Medrol)  40 mg IVP Q12 [facility]


Ondansetron HCl (Zofran Inj)  4 mg IVP Q6H PRN


   PRN Reason: Nausea/Vomiting


Pantoprazole Sodium (Protonix Inj)  40 mg IVP DAILY [facility]





Discontinued Medications





Etomidate (Amidate) Confirm Administered Dose 20 mg IV .STK-MED ONE


   Stop: 05/16/17 01:00


   Last Admin: 05/16/17 00:59  Dose: 20 mg


   Comments: administered during intubation





Furosemide (Lasix)  40 mg IVP STAT STA


   Stop: 05/16/17 00:34


Hydralazine HCl (Apresoline)  10 mg IVP Q6 [facility]


Vancomycin HCl (Vancomycin 1gm)  1 gm in 250 mls @ 167 mls/hr IVPB STAT STA


   PRN Reason: Protocol


   Stop: 05/15/17 23:01


   Last Admin: 05/16/17 00:29  Dose: 167 mls/hr





Metronidazole (Flagyl)  500 mg in 100 mls @ 100 mls/hr IVPB STAT STA


   PRN Reason: Protocol


   Stop: 05/15/17 22:32


   Last Admin: 05/15/17 23:42  Dose: 100 mls/hr





Levofloxacin/Dextrose (Levaquin 750mg)  750 mg in 150 mls @ 100 mls/hr IVPB 

STAT STA


   Stop: 05/16/17 01:43


Meropenem 250 mg/ Sodium (Chloride)  100 mls @ 100 mls/hr IVPB Q12H OSE[facility]


   PRN Reason: Protocol


   Stop: 05/16/17 01:14


Propofol (Diprivan) Confirm Administered Dose 1,000 mg in 100 mls @ ud .ROUTE 

.STK-MED ONE


   Stop: 05/16/17 01:22


   Last Admin: 05/16/17 03:18  Dose:  





Propofol (Diprivan)  500 mg in 50 mls @ 3.674 mls/hr IV .L40Q43M PRN; Protocol; 

5 MCG/KG/MIN


   PRN Reason: TITRATE PER MD ORDER


   Last Admin: 05/16/17 01:27  Dose: 5 mcg/kg/min, 3.674 mls/hr





Lorazepam (Ativan)  2 mg IVP ONCE ONE


   PRN Reason: Protocol


   Stop: 05/16/17 02:38


   Last Admin: 05/16/17 02:41  Dose: 2 mg





Lorazepam (Ativan) Confirm Administered Dose 2 mg .ROUTE .STK-MED ONE


   Stop: 05/16/17 02:42


   Last Admin: 05/16/17 03:18  Dose:  





Ondansetron HCl (Zofran Inj)  4 mg IVP STAT STA


   Stop: 05/15/17 18:23


   Last Admin: 05/15/17 21:12  Dose:  





Ondansetron HCl (Zofran Odt)  4 mg PO STAT STA


   Stop: 05/15/17 20:27


   Last Admin: 05/15/17 21:12  Dose: 4 mg











Disposition/Present on Arrival





- Present on Arrival


Any Indicators Present on Arrival: No


History of DVT/PE: Yes


History of Uncontrolled Diabetes: Yes


Urinary Catheter: No


History of Decub. Ulcer: No


History Surgical Site Infection Following: None





- Disposition


Have Diagnosis and Disposition been Completed?: Yes


Diagnosis: 


 Elevated troponin, ESRD (end stage renal disease), Obesity, Nausea and vomiting

, Elevated liver enzymes, Respiratory failure





Disposition: HOSPITALIZED


Disposition Time: 01:30


Patient Problems: 


 Current Active Problems











Problem Status Onset


 


ESRD (end stage renal disease) Acute  


 


Elevated liver enzymes Acute  


 


Elevated troponin Acute  


 


Nausea and vomiting Acute  


 


Obesity Acute  











Condition: CRITICAL

## 2017-05-16 NOTE — US
EXAM:

  US Abdomen Complete



CLINICAL HISTORY:

  61 years old, male; Pain; Abdominal pain; Generalized; Additional info: 

Elevated lfts



TECHNIQUE:

  Real-time ultrasound of the abdomen (complete) with image documentation.



COMPARISON:

  No relevant prior studies available.







FINDINGS:

  Liver: The liver is increased in echogenicity and size measuring 21 cm in 

longitudinal dimension. No intrahepatic bile duct dilation.

  Gallbladder: The gallbladder is decompressed, and contains multiple stones. 

The gallbladder wall is thickened likely secondary to underdistention.

  Common bile duct:  No stones.  No dilation.

  Pancreas: Visualization of the pancreas is limited by overlying bowel gas.

  Right kidney:  No acute findings.  No hydronephrosis.

  Left Kidney:  Unremarkable.   No hydronephrosis.

  Spleen:  Unremarkable in echogenicity and size.

  Aorta:  Unremarkable.  

  Inferior vena cava:  patent.



IMPRESSION:

Fatty infiltration of an enlarged liver.

Cholelithiasis.

 Limited evaluation of the pancreas, secondary to overlying bowel gas. 

Otherwise, unremarkable sonographic evaluation of the abdomen, as detailed 

above.

## 2017-05-16 NOTE — CON
DATE: 05/16/2017



Seen and examined in CCU.  The patient is currently intubated.  Medical history obtained from medical
 chart and medical staff.  No family currently at the bedside.



HISTORY OF PRESENT ILLNESS:  This is a 61-year-old male with a past medical history of end-stage dejon
l disease, chronic anemia, hypertension, CHF and came to the hospital with complaints of weakness and
 fatigue.  The patient had received dialysis and complained of abdominal discomfort as well as nausea
 and vomiting.  The patient's bowel movements were regular.  No history of fever or chills, shortness
 of breath or chest pain.  It is reported that the patient and his girlfriend, they were up in [facility] for the weekend.  When they returned, patient had complaints of not feeling well and nause
a and vomiting.  He had multiple episodes.  No reports of hematemesis.  No hemoptysis or complaints o
f any bleeding per rectum.  The patient had an x-ray of the abdomen which did not report any acute fi
ndings.  He did have an ultrasound of the abdomen and that reported multiple CBD stones with wall thi
ckening, likely secondary to under distention, common bile duct no dilatation or stones, fatty infilt
ration of enlarged liver.  They did also send him for a CT scan of the chest and abdomen and pelvis w
ithout any contrast and that _____.



PAST MEDICAL HISTORY:  As stated above, end-stage renal disease on dialysis, chronic anemia, hyperten
scarlet, CHF, obesity, history of gout, cardiac catheterization done on 1/20/2017 that was reported to b
e unremarkable, HIV positive, grade 1 esophageal varices, duodenal polyp, insulin-dependent diabetes.
  Last endoscopy and colonoscopy was 8/2014, found to have duodenal polyp, grade I esophageal varices
 and prominent rectal veins, but no colon polyp.



PAST SURGICAL HISTORY:  AV fistula, had declotting of fistula, status post declotting of tissue.



ALLERGIES:  PENICILLIN.



PAST SURGICAL HISTORY:  AV fistula.  Denies any abdominal surgery.  The patient had cardiac catheteri
zation that was unremarkable.



MEDICATIONS:  Reviewed as per MAR.



REVIEW OF SYSTEMS:  Systems reviewed with positive findings, see HPI.



FAMILY HISTORY:  Hypertension.



VITAL SIGNS:  Temperature is 99.1, blood pressure 165/73, pulse is 90, respirations is on ventilator,
 is 94%.



LABORATORIES:  WBC is 19.0, H and H is 8.9, hematocrit is 27.8, platelets is 120.  PT 20.9, INR is 1.
94.  Sodium 137, K 5.1, chloride 96, BUN is 43, creatinine is 6.6, total bilirubin is 2.1, AST is 703
7, this is at 9:00, ALT is 3930, alk phos is _____.



Had radiologic testing and a CT scan of chest, abdomen and pelvis with no contrast shows extensive bi
lateral pulmonary infiltrates, cardiomegaly, mild mediastinal lymphadenopathy, no pleural effusion, s
hows cholelithiasis, no evidence of cholecystitis, mild hepatosplenomegaly, localized ascites seen ab
out the liver _____, bilateral adrenal hypertrophy, additional minor findings as above.



PHYSICAL EXAMINATION:

HEENT:  Eyes are closed.

NECK:  Supple.

CARDIAC:  S1, S2.

LUNG SOUNDS:  With decreased breath sounds, but did not hear any rales or wheeze.

ABDOMEN:  With bowel sounds, soft, nontender at this time.  No rebound, guarding, or organomegaly.



The patient also had a chest x-ray and that showed new bilateral alveolar infiltrates, probable pulmo
nary edema.  Head CT shows no intracranial hemorrhage, no mass or acute infarct, chronic paranasal si
nusitis, rule out acute sinusitis.  Chest x-ray repeated this morning, pending.



PHYSICAL EXAMINATION:

HEENT:  Sclera is anicteric.  The patient is intubated.

NECK:  Supple.

CARDIAC:  S1, S2.  

LUNG SOUNDS:  With decreased breath sounds, but good air entry.  No rales or wheeze.  Mild crackles.

ABDOMEN:  With bowel sounds, soft.  No organomegaly.

EXTREMITIES:  Lower extremities, trace edema.  It is warm to touch.

NEUROLOGIC:  The patient is currently intubated and is not responsive, is on propofol drip.



ASSESSMENT:  This is a 61-year-old male with history of end-stage renal disease on dialysis, chronic 
anemia, hypertension with history of cardiac catheterization.  Came to the hospital with complaints o
f nausea and vomiting.  The patient was noted to be in acute liver failure with hepatic encephalopath
y and respiratory failure.  He is now intubated.  The patient has been started on acetylcysteine.  Un
known as to the etiology of the elevation of the liver enzymes.  Rule out medication induced acute li
katherine failure, rule out any ischemic process.



PLAN:  Right now, the patient will continue on the acetylcysteine.  His Tylenol level is less than 10
.  The patient did have history of taking Tylenol for pain as well as some nonsteroidal anti-inflamma
tory drugs and pain medication.  The patient is up for possible transfer to [facility].  The patient is curr
ently on deep venous thrombosis prophylaxis.  The patient is on heparin.  He was also noted to have e
levated troponins.  Started on lactulose for his ammonia level was at 63, is on IV antibiotics of skye
openem and Flagyl.  Continue gastrointestinal prophylaxis, is on Protonix 40.  Started on Xifaxan.



Thank you for this consultation and for allowing us to participate in your patient's care.  We will m
rosalva further recommendations based on the patient's clinical course.  We will also follow up patient's
 hepatitis panel.



The patient was seen and case discussed with Dr. Morales.





__________________________________________

Naya HINES







cc:



DD: 05/16/2017 12:00:15  [id number]

TT: 05/16/2017 13:26:20

Confirmation # [id number]

Dictation # [id number]

en

## 2017-05-16 NOTE — CON
DATE: 05/16/2017



The patient is a 61-year-old gentleman with history of hypertension, 
hyperlipidemia, end-stage renal disease on dialysis and history of CHF, who 
according to his fiancee, 2 days ago was in his usual state of health.  However
, on Monday, he started feeling fatigue and malaise as well as decreased 
tolerance to exertion.  Also, increased shortness of breath.  Shortly after 
dialysis, he vomited copious amount of nonbilious vomit and was transferred to 
ER for further management and monitoring.  Initially, it was treated as 
gastroenteritis.  However, patient became very lethargic, somnolent, altered 
and was intubated for airway protection.  Almost at the same time, his ABG 
showed hypercapnic respiratory failure and he was treated as such with 
bronchodilators, steroids.  The results of the other labs showed significantly 
elevated LFTs up to thousands as well as INR climbing up and ammonia level came 
back elevated as well.  Subsequent CMP showed a rise in LFTs, slightly elevated 
bilirubin.  Abdominal imaging including abdominal ultrasound did not reveal 
changes suspicious for acute cholecystitis or dilatation of common bile duct.  
No diarrhea, no chest pain, no constipation, no fever, no chills, no sweats.



PAST MEDICAL HISTORY:  As above.



SOCIAL HISTORY:  No alcohol or illicit drug abuse.  No tobacco smoking.



FAMILY HISTORY:  Noncontributory.



MEDICATIONS AT HOME:  Amlodipine, Protonix, mineral supplementation, insulin, 
Auryxia, vitamin D, enalapril, Welchol, Sensipar, albuterol.



REVIEW OF SYSTEMS:  Review of 12 organ systems, other than mentioned in history 
of present illness, is negative.



ALLERGIES:  PENICILLINS.



PHYSICAL EXAMINATION:

GENERAL:  The patient is intubated and is on PRVC 350/22/10/60%.  On that 
setting:

VITAL SIGNS:  His oxygen saturation 94%, blood pressure 150/60, heart rate 80.  
The patient is afebrile with temperature 99.1.

HEAD AND NECK:  Atraumatic.

LUNGS:  A few crackles bibasilarly.

HEART:  Regular rate and rhythm.  S1, S2 distant.

ABDOMEN:  Soft, nontender, nondistended, no peritoneal signs.

MUSCULOSKELETAL:  The patient is obese.  Trace bilateral pedal and ankle edema.

NEUROLOGIC:  The patient was seen moving all extremities spontaneously.

SKIN:  Moist.

PSYCHIATRIC:  The patient is sedated at present time.



LABORATORIES:  WBC 19, hemoglobin 8.9, platelet count 120.  Sodium 137, 
potassium 5.1 (the patient is on dialysis), chloride 96, carbon dioxide 27, BUN 
43, creatinine 6.6 (the patient is on hemodialysis), glucose 159.  AST 7037, 
ALT 3903, alkaline phosphatase 150, ammonia level 63, total bilirubin 2.1.  INR 
1.94, up from 1.82.  VBG showed lactic acid 1.9, pH 7.4.  ABG showed 7.39/45/
234 (since then, his FiO2 went down to 60% from 100%).  Influenza is negative.



Chest x-ray showed bilateral fluffy infiltrate.  CAT scan of the chest, abdomen 
and pelvis revealed extensive bilateral pulmonary infiltrates, cardiomegaly, 
mild mediastinal lymphadenopathy, no pleural effusion, cholelithiasis, but no 
evidence of cholecystitis, mild hepatosplenomegaly, localized ascites, inferior 
vena cava filter, bilateral adrenal hypertrophy.



Abdominal ultrasound did not reveal common bile duct dilatation and did not 
reveal acute cholecystic changes.



EKG showed no acute ischemic changes.



ASSESSMENT AND PLAN:  This is a 61-year-old gentleman who presented with acute 
liver failure with hepatic encephalopathy and respiratory failure.  It is 
unclear whether bilateral infiltrates represent aspiration pneumonitis/
pneumonia in the setting of altered mental status and vomiting or primary event 
consistent with community-acquired pneumonia.  It is also unclear whether 
patient started to hypoventilate first and then became altered due to C02 
retention or patient developed hepatic encephalopathy and was unable to 
maintain his ventilatory status, which led to C02 retention.  Nevertheless, 
acute liver failure at present time is of utmost concern.  The patient is on N-
acetylcysteine drip.  Tylenol level came back negative.  However, N-
acetylcysteine can be beneficial in other then APAP related acute liver failure 
etiologies as well.  The hepatitis profile is pending. [facility] 
center at [facility] was contacted and accepted patient (Dr. North).  The patient is on 
broad-spectrum antibiotics.  ID service is following him as well.  Septic 
workup initiated.



NEUROLOGIC:  The patient likely has hepatic encephalopathy.  We will start 
patient on lactulose and rifaximin and follow ammonia level.  We will try to 
minimize exposure to benzodiazepine or benzodiazepine receptor agonists.

PULMONARY:  We will continue with protective lung ventilation strategy to avoid 
evolution to acute respiratory distress syndrome.  We will proceed with high 
PEEP/FiO2 ratio.  We will proceed with head of bed elevated more than 35 
degrees.  The patient is on broad-spectrum antibiotics.  We will continue with 
euvolemia as patient likely has hepatic encephalopathy.  We will continue with 
conservative oxygen management as well.  We will continue with deep venous 
thrombosis and gastrointestinal prophylaxis.

CARDIOVASCULAR:  The patient is hemodynamically stable.

GASTROINTESTINAL:  We will keep n.p.o. for now except for meds.  
Gastrointestinal prophylaxis.  Head of bed elevated more than 35 degrees.  The 
patient has acute liver failure.  He is on N-acetylcysteine.  The patient is 
going to be transferred to tertiary center for evaluation by a liver transplant 
team.  The patient will be started on lactulose and rifaximin.  GI service was 
consulted.

ENDOCRINE:  We will proceed with blood glucose between 140-180 range according 
to NICE-SUGAR trial.  We will avoid hypoglycemia.

INFECTIOUS DISEASE:  The patient has bilateral infiltrates which may represent 
aspiration pneumonia versus pneumonitis versus community-acquired pneumonia.  
The patient is on broad-spectrum antibiotics.  ID service is following him and 
septic workup was initiated.  Sputum, blood and urine cultures were sent.  
Procalcitonin is pending.

RENAL: ESRD on HD

We will continue with deep venous thrombosis and gastrointestinal prophylaxis.



Addendum: oxygenation was getting worse--patient undergone HD today



ccm time 40 min





__________________________________________

Joss Chandra MD







cc:   



DD: 05/16/2017 10:24:33  [id number]

TT: 05/16/2017 11:23:29

Confirmation # [id number]

Dictation # [id number]

en

MTDD

## 2017-05-16 NOTE — CT
PROCEDURE:  CT Chest, Abdomen and Pelvis without intravenous contrast



HISTORY:

recurrent vomiting; Spiked LFT's; ?acute dayanna vs



COMPARISON:

None.



TECHNIQUE:

Radiation dose:



Total exam DLP = 1354.87 mGy-cm.



This CT exam was performed using one or more of the following dose 

reduction techniques: Automated exposure control, adjustment of the 

mA and/or kV according to patient size, and/or use of iterative 

reconstruction technique.



FINDINGS:



CT CHEST WITHOUT CONTRAST:



LUNGS:

Extensive bilateral upper lobe opacities. Right middle lobe opacity. 

Extensive consolidation with air bronchograms bilateral lower lobe.



MEDIASTINUM:

Normal caliber aorta and pulmonary artery trunk. Cardiomegaly.  No 

pericardial effusion. Shotty mediastinal nodes with mildly enlarged 

right paratracheal nodes.  No definite hilar adenopathy. Endotracheal 

tube terminates approximately 1 cm above tracheal quincy. 



LYMPH NODES:

As above



PLEURA:

Unremarkable. No pneumothorax. No pleural fluid.



BONES:

Unremarkable.



OTHER FINDINGS:

None.



CT ABDOMEN AND PELVIS:



LIVER:

Minimal hepatomegaly.  Smooth contour. .  No mass. No biliary ductal 

dilatation. 



GALLBLADDER AND BILE DUCTS:

Few calcified gallstones are identified dependently.  No clear mural 

thickening. 



PANCREAS:

Unremarkable. No gross lesion or ductal dilatation.



SPLEEN:

Minimal splenomegaly.  No mass. 



ADRENALS:

No adrenal mass.  Mild bilateral adrenal hypertrophy. 



KIDNEYS AND URETERS:

Cortical cyst mid left kidney, 1.4 cm. This was not noted on 

abdominal ultrasound examination of 5/15/2017.  No renal calculus or 

hydronephrosis. 



VASCULATURE:

Inferior vena caval filter noted.  No evidence of abdominal aortic 

aneurysm. 



BOWEL:

No bowel obstruction.  No significant retained feces.  Sigmoid 

diverticulosis.  No evidence of diverticulitis. 



APPENDIX:

Normal appendix. 



PERITONEUM:

Mild ascites seen about the liver extending into Hall's pouch. 

Nonspecific. 



LYMPH NODES:

Shotty retroperitoneal lymph nodes without significant enlarged 

retroperitoneal nodes. Uncertain significance. No pelvic 

lymphadenopathy. 



BLADDER:

Decompressed. 



REPRODUCTIVE:

Unremarkable prostate. 



BONES:

No acute fracture. 



OTHER FINDINGS:

None.



IMPRESSION:

Extensive bilateral pulmonary infiltrate. Cardiomegaly.  Mild 

mediastinal lymphadenopathy. No pleural effusion.  Endotracheal tube. 



Cholelithiasis.  No evidence of cholecystitis.  Mild 

hepatosplenomegaly.  There is localized ascites seen about the liver 

extending into Hall's pouch.  Nonspecific. Inferior vena caval 

filter. Bilateral adrenal hypertrophy. Additional minor findings as 

above.

## 2017-05-16 NOTE — RAD
HISTORY:

vomiting  



COMPARISON:

05/08/2017 



FINDINGS:



LUNGS:

There is mild to moderate cardiomegaly and mild vascular congestion 

improved from the recent study



PLEURA:

No significant pleural effusion identified, no pneumothorax apparent.



CARDIOVASCULAR:

Mild to moderate cardiomegaly



OSSEOUS STRUCTURES:

No significant abnormalities.



VISUALIZED UPPER ABDOMEN:

Normal.



OTHER FINDINGS:

None.



IMPRESSION:

There is mild to moderate cardiomegaly and mild vascular congestion 

improved from the recent study

## 2017-05-16 NOTE — CP.PCM.PCO
Physician Communication Note





- Physician Communication Note


Physician Communication Note: +Hepato-renal failure/Infiltrates--NO [place] now

!

## 2017-05-16 NOTE — HP
HISTORY OF PRESENT ILLNESS:  The patient is a 61-year-old who was in with history of _____ this ray trevino.  He states that he had a little weakness and fatigue earlier this morning.  He had his routine, u
sual dialysis and developed some abdominal discomfort and he has multiple episodes of nausea and vomi
ting.  Denies any diarrhea.  No history of fever or chills.  The patient was vomiting, he felt very w
eak and tired, so he came to Emergency Room for further evaluation.  No hemoptysis, no hematemesis, n
o rectal bleeding.



PAST MEDICAL HISTORY:

1.  Significant for end-stage renal disease, on hemodialysis.

2.  Chronic anemia.

3.  Hypertension.

4.  Morbid obesity.

5.  History of gout.

6.  Congestive heart failure.

7.  Status post cardiac catheterization that was done on 01/2017 by Dr. Thompson and was found to be unre
markable.  He had nonobstructive coronaries and ejection fraction of 65%.  

8.  History of chronic anemia requiring transfusion intermittently.



ALLERGIES:  HE IS ALLERGIC TO PENICILLIN.



PAST SURGICAL HISTORY:  Significant for AV fistula.



FAMILY HISTORY:  Significant for hypertension.



ALLERGIES:  HE IS ALLERGIC TO PENICILLIN.



MEDICATIONS AT HOME:

1.  He is on amlodipine 10 mg daily, Protonix 40 daily.

2.  Multivitamin, enalapril 20 mg daily.

3.  Welchol 2 tablets twice a day and nebulizer treatment.



SOCIAL HISTORY:  He is single, lives with his son.  No history of smoking, drinking, or alcohol use.



REVIEW OF SYSTEMS:  Significant for abdominal discomfort, feels nauseous, but did not vomit since he 
came in.



PHYSICAL EXAMINATION:

GENERAL:  He is awake and alert, communicative.

VITAL SIGNS:  He is afebrile, pulse 85, respirations 15, blood pressure 155/78.

LUNGS:  Bilateral fair airflow, no rhonchi or crackle.

HEART:  S1, S2 audible.  No murmur.

ABDOMEN:  Soft, obese, nontender.  No hepatosplenomegaly.

NEUROLOGIC:  The patient is awake and alert, able to communicate.



LABORATORY DATA:  WBC 17.1, hemoglobin 8.9, hematocrit 28.4, platelet of 122.  PT 19.7, INR 1.82.  Ch
emistry:  Sodium 138, potassium 5.5, chloride 95, CO2 of 31, BUN 31, creatinine 5.4, blood sugar 122.
  LFTs are elevated.  AST is more than 1500, ALT 1305, alkaline phosphatase 145, LDH is 11,136.  ____
_ 4, troponin 0.26.



ASSESSMENT:

1.  Intractable nausea and vomiting.

2.  Leukocytosis, rule out underlying cholecystitis versus cholangitis.

3.  End-stage renal disease, on hemodialysis.

4. _____, abnormal LFTs with etiology unclear yet, pending abdominal ultrasound.

5.  Chronic anemia.

6.  Borderline troponin, probably secondary to end-stage renal disease.

7.  Insulin-dependent diabetes.



PLAN:  Will keep patient on clear liquid.  Will start IV fluid, start him on IV antibiotic.  Blood cu
lture and urine cultures are sent.  ID consult by Dr. Ellis.  He is answered by Dr. Morales and
 Surgical consult by Dr. Bruner will be requested.  Will follow up LFTs in a.m. Drug screen, uri
ne screen, and hepatitis profile has been requested.  We will follow up CBC and CMP in a.m.





__________________________________________

Antonieta Rae MD







cc:



DD: 05/15/2017 22:55:25  [id number]

TT: 05/16/2017 02:14:06

Job # [id number]

mn

## 2017-05-16 NOTE — CARD
--------------- APPROVED REPORT --------------





EKG Measurement

Heart [id number]

AR [id number]

[id number]

[id number]

[id number]



<Conclusion>

Normal sinus rhythm

Possible Left atrial enlargement

Cannot rule out Anterior infarct, age undetermined

Abnormal ECG

## 2017-05-16 NOTE — CP.PCM.CON
<Parmjit Tirado - Last Filed: 05/16/17 01:01>





History of Present Illness





- History of Present Illness


History of Present Illness: 





Surgery consult for Dr. Allan





61  M with PMH of ESRD on HD M, W, F, HTN, HLD, COPD, CHF, HIV 

presents with N/V since this AM. Pt had AMS and history was taken from friend. 

Pt has been having weakness, SOB, dry cough for a few weeks and these symptoms 

have been progressively worsen. This AM he started to vomit multiple times. Non 

bloody, non bilious. He was able to tolerate a banana today. Pt drank 4 beers 

yesterday.  Pt also has chronic pain and takes Advil daily and takes percocet 

as needed. Friend also reports that pt had back pain for a few weeks and had 

Xray or MRI taken two weeks ago at [facility] that showed abnormality. Pt is oliguric 

and has alternating Diarrhea and Constipation.  Denies F/C/CP/ABd pain/

hematemesis/hematochezia/melena/hemoptesis . US shows thicken GB wall, sludge 

and pericholecystic fluids. WBC was 17. Tbili 2.1 AST 1500+, ALT 1300. Trop 0.35

, Ph 7.11 PCo2 84. 





SS: smokes 1/2 pack daily, social drinker, no IV drug abuse. 


Sx: Carpal tunnel release 2013








Review of Systems





- Review of Systems


Systems not reviewed;Unavailable: Altered Mental Status


Review of Systems: 





See HPI 





- Constitutional


Constitutional: absent: Anorexia, Chills





Past Patient History





- Infectious Disease


Hx of Infectious Diseases: None





- Tetanus Immunizations


Tetanus Immunization: Unknown





- Past Social History


Smoking Status: Light Smoker < 10 Cigarettes Daily





- CARDIAC


Hx Cardiac Disorders: Yes


Hx Cardia Arrhythmia: Yes


Hx Hypertension: Yes





- PULMONARY


Hx Respiratory Disorders: No (smokes cigarettes)


Hx Asthma: No





- NEUROLOGICAL


Hx Neurological Disorder: No





- HEENT


Hx HEENT Problems: No





- RENAL


Hx Chronic Kidney Disease: Yes


Hx Dialysis: Yes (MWF)


Type of Dialysis Access: L arm fistula


Date of Last Dialysis Treatment: 05/15/17


Hx Kidney Stones: Yes


Hx Neurogenic Bladder: No


Hx Pyelonephritis: No


Hx Renal (Kidney) Cancer: No


Hx Renal Failure: Yes





- ENDOCRINE/METABOLIC


Hx Endocrine Disorders: Yes


Hx Diabetes Mellitus Type 1: Yes





- HEMATOLOGICAL/ONCOLOGICAL


Hx Blood Disorders: Yes


Hx Anemia: Yes (BLOOD TRANSFUSION)





- INTEGUMENTARY


Hx Dermatological Problems: No





- MUSCULOSKELETAL/RHEUMATOLOGICAL


Hx Musculoskeletal Disorders: Yes


Hx Falls: Yes


Other/Comment: carpal tunnel





- GASTROINTESTINAL


Hx Gastrointestinal Disorders: Yes


Hx Gastroesophageal Reflux: Yes





- GENITOURINARY/GYNECOLOGICAL


Hx Genitourinary Disorders: No





- PSYCHIATRIC


Hx Psychophysiologic Disorder: No


Hx Substance Use: No





- SURGICAL HISTORY


Hx Cholecystectomy: No


Hx Coronary Stent: Yes (1 2017)


Hx Gastric Bypass Surgery: No


Hx Hysterectomy: No


Hx Joint Replacement: No


Hx Kidney Transplant: No


Hx Liver Transplant: No


Hx Mastectomy: No


Hx Musculoskeletal Surgery: No


Hx Open Heart Surgery: No


Hx Orthopedic Surgery: No


Hx Splenectomy: No


Hx Valve Replacement: No


Other/Comment: Dialysis shunt placement in left arm.





- ANESTHESIA


Hx Anesthesia: Yes


Hx Anesthesia Reactions: No


Hx Malignant Hyperthermia: No





Meds


Allergies/Adverse Reactions: 


 Allergies











Allergy/AdvReac Type Severity Reaction Status Date / Time


 


Penicillins Allergy  SWELLING Verified 05/15/17 18:09














- Medications


Medications: 


 Current Medications





Acetaminophen (Tylenol 325mg Tab)  650 mg PO Q6H PRN


   PRN Reason: Fever >100.4 F


Albuterol/Ipratropium (Duoneb 3 Mg/0.5 Mg (3 Ml) Ud)  3 ml IH Q2H PRN


   PRN Reason: Shortness of Breath


Albuterol/Ipratropium (Duoneb 3 Mg/0.5 Mg (3 Ml) Ud)  3 ml IH [id number] [facility]


Hydralazine HCl (Apresoline)  10 mg IVP Q6 O[facility]


Metronidazole (Flagyl)  500 mg in 100 mls @ 100 mls/hr IVPB Q8 [facility]


   PRN Reason: Protocol


Levofloxacin/Dextrose (Levaquin 750mg)  750 mg in 150 mls @ 100 mls/hr IVPB 

STAT STA


   Stop: 05/16/17 01:43


Meropenem 250 mg/ Sodium (Chloride)  100 mls @ 100 mls/hr IVPB Q12H [facility]


   PRN Reason: Protocol


   Stop: 05/16/17 01:14


Insulin Human Regular (Humulin R Med)  0 units SC ACHS [facility]


   PRN Reason: Protocol


Ondansetron HCl (Zofran Inj)  4 mg IVP Q6H PRN


   PRN Reason: Nausea/Vomiting


Pantoprazole Sodium (Protonix Inj)  40 mg IVP DAILY [facility]











Physical Exam





- Constitutional


Appears: In Acute Distress





- Head Exam


Head Exam: ATRAUMATIC, NORMAL INSPECTION, NORMOCEPHALIC





- Eye Exam


Eye Exam: EOMI, PERRL.  absent: Conjunctival injection, Scleral icterus


Pupil Exam: NORMAL ACCOMODATION





- ENT Exam


ENT Exam: Mucous Membranes Moist, Normal Exam





- Neck Exam


Neck exam: Positive for: Normal Inspection





- Respiratory Exam


Respiratory Exam: absent: Accessory Muscle Use, Chest Wall Tenderness, NORMAL 

BREATHING PATTERN





- Cardiovascular Exam


Cardiovascular Exam: REGULAR RHYTHM, +S1, +S2





- GI/Abdominal Exam


GI & Abdominal Exam: Soft.  absent: Distended, Firm, Guarding, Hernia, 

Tenderness





- Neurological Exam


Neurological exam: Altered





- Skin


Skin Exam: Dry, Intact, Normal Color, Warm





Results





- Vital Signs


Recent Vital Signs: 


 Last Vital Signs











Temp  99.4 F   05/15/17 22:40


 


Pulse  86   05/16/17 00:15


 


Resp  15   05/16/17 00:15


 


BP  150/70   05/16/17 00:15


 


Pulse Ox  92 L  05/16/17 00:15














- Labs


Result Diagrams: 


 05/15/17 20:20





 05/15/17 20:20


Labs: 


 Laboratory Results - last 24 hr











  05/15/17 05/15/17





  22:30 23:15


 


pCO2   84 H*


 


pO2   81.0


 


HCO3   26.7


 


ABG pH   7.11 L*


 


ABG Total CO2   29.3 H


 


ABG O2 Saturation   94.2 L


 


ABG O2 Content   11.1 L


 


ABG Base Excess   -3.5 L


 


ABG Hemoglobin   8.8 L


 


ABG Carboxyhemoglobin   5.5 H


 


POC ABG HHb (Measured)   5.4 H


 


ABG Methemoglobin   0.8


 


ABG O2 Capacity   11.8 L


 


Hgb O2 Saturation   88.3 L


 


FiO2   100.0


 


Troponin I  0.35 H* D 














Assessment & Plan





- Assessment and Plan (Free Text)


Assessment: 


Vomiting with Transaminitis, r/o Cholecystitis , hepatitis 





-F/U official US reads


-Trend Labs: LFT, CBC, CMP 


-MOnitor VS 


-f/u CT 


 


Will DW Dr. Allan  








<Miguel Allan - Last Filed: 05/16/17 08:29>





Meds





- Medications


Medications: 


 Current Medications





Albuterol/Ipratropium (Duoneb 3 Mg/0.5 Mg (3 Ml) Ud)  3 ml IH Q2H PRN


   PRN Reason: Shortness of Breath


Albuterol/Ipratropium (Duoneb 3 Mg/0.5 Mg (3 Ml) Ud)  3 ml IH [id number] [facility]


Heparin Sodium (Porcine) (Heparin)  5,000 units SC Q12 OSEAS


   PRN Reason: Protocol


Hydralazine HCl (Apresoline)  10 mg IVP Q6 PRN


   PRN Reason: SBP >180


Metronidazole (Flagyl)  500 mg in 100 mls @ 100 mls/hr IVPB Q8 OSEAS


   PRN Reason: Protocol


Propofol (Diprivan)  1,000 mg in 100 mls @ 3.674 mls/hr IV .Q24H PRN; Protocol; 

5 MCG/KG/MIN


   PRN Reason: TITRATE PER MD ORDER


   Last Titration: 05/16/17 03:35 Dose:  20 mcg/kg/min, 14.696 mls/hr


Meropenem 500 mg/ Sodium (Chloride)  100 mls @ 100 mls/hr IVPB Q12 [facility]


   Stop: 05/23/17 06:23


Acetylcysteine 18,300 mg/ (Dextrose)  291.5 mls @ 291.5 mls/hr IV .Q1H ONE


   PRN Reason: Protocol


   Stop: 05/16/17 09:29


Acetylcysteine 6,100 mg/ (Dextrose)  530.5 mls @ 132.625 mls/hr IV .Q4H ONE


   PRN Reason: Protocol


   Stop: 05/16/17 13:59


Acetylcysteine 12,200 mg/ (Dextrose)  1,061 mls @ 66.313 mls/hr IV .Q16H ONE


   PRN Reason: Protocol


   Stop: 05/17/17 05:59


Insulin Human Regular (Humulin R Med)  0 units SC ACHS [facility]


   PRN Reason: Protocol


Methylprednisolone (Solu-Medrol)  40 mg IVP Q12 [facility]


   Last Admin: 05/16/17 06:49 Dose:  40 mg


Ondansetron HCl (Zofran Inj)  4 mg IVP Q6H PRN


   PRN Reason: Nausea/Vomiting


Pantoprazole Sodium (Protonix Inj)  40 mg IVP DAILY [facility]











Results





- Vital Signs


Recent Vital Signs: 


 Last Vital Signs











Temp  99.1 F   05/16/17 08:05


 


Pulse  78   05/16/17 08:05


 


Resp  22   05/16/17 08:05


 


BP  162/70 H  05/16/17 08:05


 


Pulse Ox  99   05/16/17 08:05














- Labs


Result Diagrams: 


 05/16/17 06:30





 05/15/17 20:20


Labs: 


 Laboratory Results - last 24 hr











  05/15/17 05/15/17 05/16/17





  22:30 23:15 01:42


 


WBC   


 


RBC   


 


Hgb   


 


Hct   


 


MCV   


 


MCH   


 


MCHC   


 


RDW   


 


Plt Count   


 


MPV   


 


Gran %   


 


Lymph % (Auto)   


 


Mono % (Auto)   


 


Eos % (Auto)   


 


Baso % (Auto)   


 


Gran #   


 


Lymph #   


 


Mono #   


 


Eos #   


 


Baso #   


 


pCO2   84 H* 


 


pO2   81.0 


 


HCO3   26.7 


 


ABG pH   7.11 L* 


 


ABG Total CO2   29.3 H 


 


ABG O2 Saturation   94.2 L 


 


ABG O2 Content   11.1 L 


 


ABG Base Excess   -3.5 L 


 


ABG Hemoglobin   8.8 L 


 


ABG Carboxyhemoglobin   5.5 H 


 


POC ABG HHb (Measured)   5.4 H 


 


ABG Methemoglobin   0.8 


 


ABG O2 Capacity   11.8 L 


 


Hgb O2 Saturation   88.3 L 


 


FiO2   100.0 


 


Ammonia    63 H


 


Troponin I  0.35 H* D  


 


Influenza Typ A,B (EIA)   














  05/16/17 05/16/17 05/16/17





  03:16 04:11 05:30


 


WBC   


 


RBC   


 


Hgb   


 


Hct   


 


MCV   


 


MCH   


 


MCHC   


 


RDW   


 


Plt Count   


 


MPV   


 


Gran %   


 


Lymph % (Auto)   


 


Mono % (Auto)   


 


Eos % (Auto)   


 


Baso % (Auto)   


 


Gran #   


 


Lymph #   


 


Mono #   


 


Eos #   


 


Baso #   


 


pCO2  75 H*   45


 


pO2  76.0 L   234.0 H


 


HCO3  26.7   27.2


 


ABG pH  7.16 L*   7.39


 


ABG Total CO2  29.0 H   28.6 H


 


ABG O2 Saturation  94.5 L   99.7 H


 


ABG O2 Content  11.2 L   13.1 L


 


ABG Base Excess  -2.6 L   1.9


 


ABG Hemoglobin  8.8 L   9.3 L


 


ABG Carboxyhemoglobin  4.0 H   2.6 H


 


POC ABG HHb (Measured)  5.2 H   0.3


 


ABG Methemoglobin  0.9   1.5


 


ABG O2 Capacity  11.9 L   13.1 L


 


Hgb O2 Saturation  89.9 L   95.6


 


FiO2  100.0   100.0


 


Ammonia   


 


Troponin I   


 


Influenza Typ A,B (EIA)   Negative for flu a/b 














  05/16/17





  06:30


 


WBC  18.4 H


 


RBC  2.97 L


 


Hgb  8.8 L


 


Hct  27.5 L


 


MCV  92.6


 


MCH  29.6


 


MCHC  32.0


 


RDW  17.5 H


 


Plt Count  122


 


MPV  9.5


 


Gran %  85.0 H


 


Lymph % (Auto)  9.6 L


 


Mono % (Auto)  5.2


 


Eos % (Auto)  0.1 L


 


Baso % (Auto)  0.1


 


Gran #  15.63 H


 


Lymph #  1.8


 


Mono #  1.0 H


 


Eos #  0.0


 


Baso #  0.01


 


pCO2 


 


pO2 


 


HCO3 


 


ABG pH 


 


ABG Total CO2 


 


ABG O2 Saturation 


 


ABG O2 Content 


 


ABG Base Excess 


 


ABG Hemoglobin 


 


ABG Carboxyhemoglobin 


 


POC ABG HHb (Measured) 


 


ABG Methemoglobin 


 


ABG O2 Capacity 


 


Hgb O2 Saturation 


 


FiO2 


 


Ammonia 


 


Troponin I 


 


Influenza Typ A,B (EIA) 














Assessment & Plan





- Assessment and Plan (Free Text)


Assessment: 


Dx Toxicity Acutr Hepatic insufficiency(GOT 2K)


CholelithiasiRising TroponinCH Renal Failure


PT 20


No labs this am-Needs Central line


? [facility] Liver [place] transfer





No surgery now








This consult done under my direct supervision





ERIN Allan MD FACS

## 2017-05-16 NOTE — CP.PCM.CON
History of Present Illness





- History of Present Illness


History of Present Illness: 


61 year old male with PMH of HTN, ESRD on HD, dyslipidemia, morbid obesity with 

BMI 45 was brought in to [facility] because of nausea and vomiting 

a day prior to the hospital. The patient apparently experiencing flu-like 

symptoms last week and was given Zithromax by his PMD. In the ED, the patient 

was noted to be lethargic and he was intubated because of worsening lethargy. 

There was no note of fever, no convulsions, no hematuria, no diarrhea, no loss 

of consciousness. Full review of systems is unobtainable because of the patient 

being intubated. He underwent CT chest/abdomen and pelvis which showed 

bilateral lung consolidation and infiltrates. Infectious Diseases consult is 

requested to further evaluate and manage.





Review of Systems





- Review of Systems


Systems not reviewed;Unavailable: Intubated





Past Patient History





- Infectious Disease


Hx of Infectious Diseases: None





- Tetanus Immunizations


Tetanus Immunization: Unknown





- Past Social History


Smoking Status: Light Smoker < 10 Cigarettes Daily





- CARDIAC


Hx Cardiac Disorders: Yes


Hx Cardia Arrhythmia: Yes


Hx Hypertension: Yes





- PULMONARY


Hx Respiratory Disorders: No (smokes cigarettes)


Hx Asthma: No





- NEUROLOGICAL


Hx Neurological Disorder: No





- HEENT


Hx HEENT Problems: No





- RENAL


Hx Chronic Kidney Disease: Yes


Hx Dialysis: Yes (MWF)


Type of Dialysis Access: L arm fistula


Date of Last Dialysis Treatment: 05/15/17


Hx Kidney Stones: Yes


Hx Neurogenic Bladder: No


Hx Pyelonephritis: No


Hx Renal (Kidney) Cancer: No


Hx Renal Failure: Yes





- ENDOCRINE/METABOLIC


Hx Endocrine Disorders: Yes


Hx Diabetes Mellitus Type 1: Yes





- HEMATOLOGICAL/ONCOLOGICAL


Hx Blood Disorders: Yes


Hx Anemia: Yes (BLOOD TRANSFUSION)





- INTEGUMENTARY


Hx Dermatological Problems: No





- MUSCULOSKELETAL/RHEUMATOLOGICAL


Hx Musculoskeletal Disorders: Yes


Hx Falls: Yes


Other/Comment: carpal tunnel





- GASTROINTESTINAL


Hx Gastrointestinal Disorders: Yes


Hx Gastroesophageal Reflux: Yes





- GENITOURINARY/GYNECOLOGICAL


Hx Genitourinary Disorders: No





- PSYCHIATRIC


Hx Psychophysiologic Disorder: No


Hx Substance Use: No





- SURGICAL HISTORY


Hx Cholecystectomy: No


Hx Coronary Stent: Yes (1 2017)


Hx Gastric Bypass Surgery: No


Hx Hysterectomy: No


Hx Joint Replacement: No


Hx Kidney Transplant: No


Hx Liver Transplant: No


Hx Mastectomy: No


Hx Musculoskeletal Surgery: No


Hx Open Heart Surgery: No


Hx Orthopedic Surgery: No


Hx Splenectomy: No


Hx Valve Replacement: No


Other/Comment: Dialysis shunt placement in left arm.





- ANESTHESIA


Hx Anesthesia: Yes


Hx Anesthesia Reactions: No


Hx Malignant Hyperthermia: No





Meds


Allergies/Adverse Reactions: 


 Allergies











Allergy/AdvReac Type Severity Reaction Status Date / Time


 


Penicillins Allergy  SWELLING Verified 05/15/17 18:09














- Medications


Medications: 


 Current Medications





Acetaminophen (Tylenol 325mg Tab)  650 mg PO Q6H PRN


   PRN Reason: Fever >100.4 F


Albuterol/Ipratropium (Duoneb 3 Mg/0.5 Mg (3 Ml) Ud)  3 ml IH Q2H PRN


   PRN Reason: Shortness of Breath


Albuterol/Ipratropium (Duoneb 3 Mg/0.5 Mg (3 Ml) Ud)  3 ml IH [id number] [facility]


Heparin Sodium (Porcine) (Heparin)  5,000 units SC Q12 [facility]


   PRN Reason: Protocol


Hydralazine HCl (Apresoline)  10 mg IVP Q6 PRN


   PRN Reason: SBP >180


Metronidazole (Flagyl)  500 mg in 100 mls @ 100 mls/hr IVPB Q8 [facility]


   PRN Reason: Protocol


Propofol (Diprivan)  1,000 mg in 100 mls @ 3.674 mls/hr IV .Q24H PRN; Protocol; 

5 MCG/KG/MIN


   PRN Reason: TITRATE PER MD ORDER


   Last Titration: 05/16/17 03:35 Dose:  20 mcg/kg/min, 14.696 mls/hr


Meropenem 1g/NS 100mL IVPB (Meropenem 1g/Ns 100ml Ivpb)  1 gm in 100 mls @ 100 

mls/hr IVPB Q12 [facility]


   PRN Reason: Protocol


   Stop: 05/23/17 06:23


Insulin Human Regular (Humulin R Med)  0 units SC ACHS [facility]


   PRN Reason: Protocol


Methylprednisolone (Solu-Medrol)  40 mg IVP Q12 [facility]


Ondansetron HCl (Zofran Inj)  4 mg IVP Q6H PRN


   PRN Reason: Nausea/Vomiting


Pantoprazole Sodium (Protonix Inj)  40 mg IVP DAILY [facility]











Physical Exam





- Constitutional


Appears: Other (Intubated and sedated)





- Head Exam


Head Exam: NORMAL INSPECTION





- ENT Exam


Additional comments: 





ET tube in place





- Neck Exam


Neck exam: Negative for: Lymphadenopathy, Meningismus





- Respiratory Exam


Respiratory Exam: Decreased Breath Sounds, Rales (scattered)





- Cardiovascular Exam


Cardiovascular Exam: +S1, +S2





- GI/Abdominal Exam


GI & Abdominal Exam: Soft.  absent: Tenderness





- Extremities Exam


Additional comments: 





left upper arm AV fistula intact with good thrill





Results





- Vital Signs


Recent Vital Signs: 


 Last Vital Signs











Temp  99.4 F   05/15/17 22:40


 


Pulse  77   05/16/17 05:08


 


Resp  14   05/16/17 05:08


 


BP  159/76 H  05/16/17 05:08


 


Pulse Ox  100   05/16/17 05:08














- Labs


Result Diagrams: 


 05/16/17 09:00





 05/16/17 09:00


Labs: 


 Laboratory Results - last 24 hr











  05/15/17 05/15/17 05/16/17





  22:30 23:15 01:42


 


pCO2   84 H* 


 


pO2   81.0 


 


HCO3   26.7 


 


ABG pH   7.11 L* 


 


ABG Total CO2   29.3 H 


 


ABG O2 Saturation   94.2 L 


 


ABG O2 Content   11.1 L 


 


ABG Base Excess   -3.5 L 


 


ABG Hemoglobin   8.8 L 


 


ABG Carboxyhemoglobin   5.5 H 


 


POC ABG HHb (Measured)   5.4 H 


 


ABG Methemoglobin   0.8 


 


ABG O2 Capacity   11.8 L 


 


Hgb O2 Saturation   88.3 L 


 


FiO2   100.0 


 


Ammonia    63 H


 


Troponin I  0.35 H* D  


 


Influenza Typ A,B (EIA)   














  05/16/17 05/16/17





  03:16 04:11


 


pCO2  75 H* 


 


pO2  76.0 L 


 


HCO3  26.7 


 


ABG pH  7.16 L* 


 


ABG Total CO2  29.0 H 


 


ABG O2 Saturation  94.5 L 


 


ABG O2 Content  11.2 L 


 


ABG Base Excess  -2.6 L 


 


ABG Hemoglobin  8.8 L 


 


ABG Carboxyhemoglobin  4.0 H 


 


POC ABG HHb (Measured)  5.2 H 


 


ABG Methemoglobin  0.9 


 


ABG O2 Capacity  11.9 L 


 


Hgb O2 Saturation  89.9 L 


 


FiO2  100.0 


 


Ammonia  


 


Troponin I  


 


Influenza Typ A,B (EIA)   Negative for flu a/b














Assessment & Plan





- Assessment and Plan (Free Text)


Plan: 





Assessment


Severe sepsis with ventilator-dependent respiratory failure and acute liver 

failure due to bilateral healthcare-associated pneumonia with possible gram 

positive cocci and/or gram negative bacilli


HTN


ESRD on HD


dyslipidemia


morbid obesity with BMI 45 





Plan


Gave patient a dose of IV Vancomycin and started Merrem and Doxycycline pending 

blood, urine, sputum cx, PCT; reviewed CT scan which showed the bilateral 

consolidations and infiltrates in the lungs; reviewed ultrasound of the abdomen 

which showed the fatty liver and hepatomegaly


Patient is in critical condition

## 2017-05-16 NOTE — CON
DATE: 05/16/2017



REASON FOR CONSULTATION:  Positive troponin, history of coronary artery disease
, status post intubated, cardiac evaluation.



BRIEF CLINICAL HISTORY:  This is a 61-year-old male with a past medical history 
significant for insulin dependent diabetes, hypertension, end-stage renal 
disease, hyperlipidemia, history of cardiac catheterization on 1/12/2017, 
nonobstructive coronary artery disease, medical treatment recommended, who came 
to the Emergency Room with complaint of shortness of breath which is 
progressively worsening requiring intubation.  The patient is currently being 
intubated in ER, on way to CAT scan for abdomen, pelvis and head and then 
patient will be heading towards ICU.  Information obtained from the electronic 
medical record as patient is being intubated, and ER nurse and physician from 
the ER, as patient is unable to communicate.  Apparently, it appears that 
patient was experiencing flu-like symptoms over the past week and recently went 
to the PMD and got the Z-Jaylon, started 3 days ago.  Then, patient become more 
short of breath, lethargic, obtunded and patient came to the Emergency Room, 
was unable to breathe and went into respiratory distress requiring intubation.  
Also is mentioned in the chart that recently patient went to PMD, got the Z-Jaylon
, started for 3 days.  Over the weekend, patient went to [facility].  
This morning, experiencing vomiting, nonbloody, nonbilious and began to develop 
fever, chills and was very lethargic, so 911 was called by the family and the 
patient was brought here.  After this, patient was found to be in distress 
requiring intubation.  Initial EKG shows normal sinus, no ST-T changes, no 
acute ST-T changes noted.  Initial plan was to admit the patient to telemetry 
and GI consult, but later on, patient's respiratory status deteriorated 
requiring intubation.  Currently, patient is being intubated in ER on way to 
CAT scan for abdomen, pelvis and head and then will be heading towards the ICU.



PAST MEDICAL HISTORY:  Significant for HIV test was found positive and later 
subsequently was negative, history of hypertension, hyperlipidemia, end-stage 
renal disease on dialysis, chronic anemia, multiple transfusions in the past, 
history of CHF, history of HIV positive, later on turned out to be negative.



PAST SURGICAL HISTORY:  Significant for AV fistula, declotting of AV fistula.



PREVIOUS CARDIAC WORKUP:  The patient had myocardial study perfusion 8/02/2016 
that was reported essentially normal myocardial perfusion study, fixed defects 
secondary to diaphragmatic attenuation.  The patient had last echocardiography 7
/29/2016 that shows LV hypertrophy, ejection fraction 55%-60%, no aortic 
regurgitation, mild to moderate mitral regurgitation, trace tricuspid regurg, 
RV systolic pressure 18, dated 7/29/2016.  Later on this admission, patient had 
a non-ST-segment myocardial infarction, troponin was positive, unstable angina, 
so patient underwent cardiac catheterization dated 1/12/2017 as during this 
admission patient has a positive troponin and thought to be acute coronary 
syndrome.  Cardiac catheterization revealed nonobstructive coronary artery 
disease, diffuse atherosclerotic burden noted, but no flow limiting lesion 
identified, ejection fraction 65%, EDP was in the range of 30%-35% and 
recommendation at that time was cardiac rehab, aggressive medical treatment, 
risk factor reduction recommended as the coronary anatomy says that right side 
is dominant, left main large no significant disease.  LAD calcified toward the 
vessels without significant disease.  Circumflex is calcified.  There is no 
significant disease.  Right coronary artery is also diffusely diseased, but no 
flow obstructive stenosis noted.



SOCIAL HISTORY:  Denies smoking.  Denies any history of alcohol abuse in the 
past.  History of substance abuse in the past.



ALLERGIES PENICILLIN.



Currently, patient was taking at home, amlodipine, pantoprazole, insulin, 
ferric sulfate, enalapril, Welchol, Sensipar.



REVIEW OF SYSTEMS:  As per HPI.



PHYSICAL EXAMINATION:

VITAL SIGNS:  Temperature afebrile, heart rate 80, blood pressure 162/70.

HEENT:  PERRLA.  Extraocular muscles intact.

NECK:  Supple.  No carotid bruits.  No thyromegaly.

CHEST:  Clear to auscultation.

HEART:  S1, S2 regular.

ABDOMEN:  Soft.

EXTREMITIES:  Clubbing, cyanosis negative.  Decreased air entry bilateral.



BLOOD WORKUP:  WBC 18.4, hemoglobin 8.8, hematocrit 27.8, platelet count 122.  
Chemistry shows sodium 137, potassium 5.2, chloride 95, carbon dioxide 28, 
anion gap of 21, BUN 45, creatinine is 4.3.  AST 7293,ALT 3925, Alkaline phos- 
149.



Chest x-ray shows pulmonary edema, congestion, flash pulmonary edema.



IMPRESSION:  Acute pulmonary edema, acute respiratory failure, respiratory 
distress, rule out early pneumonia, end-stage renal disease on dialysis, 
history of coronary artery disease, nonobstructive, status post cardiac 
catheterization, pulmonary edema, end-stage renal disease on dialysis, status 
post cardiac catheterization 1/12/2017 that shows diffuse atherosclerotic burden
, no flow obstructive stenosis noted, preserved left ventricular function.  
Rule out early pneumonia.  Positive troponin secondary to end-stage renal 
disease on dialysis and probably it is stress.Abnormal LFTS possibly secondary 
to shock liver??  I doubt it is a myocardial infarction.  We will follow the 
CAT scan.  Further recommendation after the workup.  We will get echo to assess 
left ventricular function.  We will follow with you.



Thank you, Dr. Rae, for providing us the opportunity in taking care of the 
patient.





__________________________________________

Sunil Thompson MD







cc:   



DD: 05/16/2017 09:08:17  [id number]

TT: 05/16/2017 09:48:43

Confirmation # [id number]

Dictation # [id number]

en

MTDD

## 2017-05-17 LAB
ADD MANUAL DIFF?: NO
ALBUMIN/GLOB SERPL: 0.7 {RATIO}
ALBUMIN/GLOB SERPL: 0.8 {RATIO}
ALP SERPL-CCNC: 145 U/L
ALP SERPL-CCNC: 46 U/L
ALT SERPL-CCNC: 2425 U/L
APTT BLD: 26 SECONDS
AST SERPL-CCNC: 2468 U/L
BILIRUB SERPL-MCNC: 4.4 MG/DL
BILIRUB SERPL-MCNC: 6.1 MG/DL
BUN SERPL-MCNC: 24 MG/DL
BUN SERPL-MCNC: 42 MG/DL
CALCIUM SERPL-MCNC: 8.8 MG/DL
CALCIUM SERPL-MCNC: 9.4 MG/DL
CHLORIDE SERPL-SCNC: 88 MMOL/L
CHLORIDE SERPL-SCNC: 97 MMOL/L
CHOLEST SERPL-MCNC: 156 MG/DL
CO2 SERPL-SCNC: 26 MMOL/L
CO2 SERPL-SCNC: 29 MMOL/L
ERYTHROCYTE [DISTWIDTH] IN BLOOD BY AUTOMATED COUNT: 18 %
GLOBULIN SER-MCNC: 4.5 GM/DL
GLOBULIN SER-MCNC: 5.1 GM/DL
GLUCOSE SERPL-MCNC: 134 MG/DL
GLUCOSE SERPL-MCNC: 338 MG/DL
HCO3 BLDA-SCNC: 27.1 MMOL/L
HCO3 BLDA-SCNC: 28.2 MMOL/L
HCT VFR BLD CALC: 25.2 %
INR PPP: 1.63
MAGNESIUM SERPL-MCNC: 2 MG/DL
MCH RBC QN AUTO: 30 PG
MCHC RBC AUTO-ENTMCNC: 31.7 G/DL
MCV RBC AUTO: 94.4 FL
PH BLDA: 7.28 [PH]
PH BLDA: 7.35 [PH]
PHOSPHATE SERPL-MCNC: 6.2 MG/DL
PLATELET # BLD: 154 10^3/UL
PMV BLD AUTO: 9.7 FL
PO2 BLDA: 69 MM/HG
PO2 BLDA: 91 MM/HG
POTASSIUM SERPL-SCNC: 4 MMOL/L
POTASSIUM SERPL-SCNC: 4.5 MMOL/L
PROT SERPL-MCNC: 8 G/DL
PROT SERPL-MCNC: 8.5 G/DL
SODIUM SERPL-SCNC: 132 MMOL/L
SODIUM SERPL-SCNC: 136 MMOL/L
WBC # BLD AUTO: 24.8 10^3/UL

## 2017-05-17 PROCEDURE — 5A1D60Z: ICD-10-PCS

## 2017-05-17 PROCEDURE — 30233N1 TRANSFUSION OF NONAUTOLOGOUS RED BLOOD CELLS INTO PERIPHERAL VEIN, PERCUTANEOUS APPROACH: ICD-10-PCS | Performed by: INTERNAL MEDICINE

## 2017-05-17 NOTE — PN
DATE: 05/17/2017



REASON FOR CONSULTATION AND FOLLOWUP:  Positive troponin, history of coronary artery disease status p
ost intubated, cardiac evaluation, acute liver failure, end-stage renal disease on dialysis.



BRIEF CLINICAL HISTORY:  This is a 61-year-old male with past medical history significant for insulin
-dependent diabetes, hypertension, hyperlipidemia, end-stage renal disease on dialysis, admitted with
 shortness of breath, intubated, requiring intubation, history of cardiac catheterization 1/12/2017, 
nonobstructive coronary artery disease medical treatment recommended.  Came to the Emergency Room.  A
fter being intubated, found to be in acute liver failure.  LFTs in 7000.  Awaiting to be transferred 
to [facility] for acute liver failure, still being intubated, critically ill and sedated, possibly hepatic e
ncephalopathy.



PHYSICAL EXAMINATION:

VITAL SIGNS:  Temperature afebrile, heart rate ____, blood pressure 109/47.

HEENT:  PERRLA. Extraocular muscles intact.

NECK:  Supple.  No carotid bruits.  No thyromegaly.

CHEST:  Clear to auscultation.

HEART:  S1, S2 regular.

ABDOMEN:  Soft.

EXTREMITIES:  Clubbing and cyanosis negative.



LABORATORY DATA:  Blood workup as follows:  WBC 24.8, hemoglobin 8, hematocrit 25.2, platelet count 1
54.  Chemistry shows sodium 130, potassium 4.5, chloride ____, carbon dioxide 26, anion gap of 23, BU
N 42, creatinine 5.  AST 7943.  ALT 3914 as of yesterday.  Today, AST 3294 and ALT 2914.



IMPRESSION:  Acute liver failure, etiology is not clear.  End-stage renal disease on dialysis, status
 post cardiac catheterization, nonobstructive coronary artery disease in 1/2017, history of once HIV 
positive, which is false now, repeat test was HIV negative.  Last echo shows ejection fraction 55%-60
% dated 07/29/2015, cardiac catheterization 01/02/2017 showed nonobstructive coronary artery disease,
 ejection fraction 65%, EDP was in the range of 30-36, respiratory failure, sepsis, possibly acute li
katherine failure, etiology not clear and no evidence of acute myocardial infarction.  Borderline positive 
troponin was 0.75 is probably secondary to end-stage renal disease and hemodynamic instability.  Trop
onin never went more than 0.75.



RECOMMENDATION:  Continue supportive care.  Keep a negative fluid balance, patient going to [facility], non
cardiac pulmonary edema.  Possible transfer to [facility].  Continue dialysis.  Try to keep negative fluid b
alance for better oxygenation.  The patient is PEEP now with FI02 of 60%.  We will follow with you.  
Overall, the patient is critical, ill.  Prognosis is guarded, possibly hepatic encephalopathy.





__________________________________________

Sunil Thompson MD







cc:



DD: 05/17/2017 17:21:32  [id number]

TT: 05/17/2017 18:17:16

Confirmation # [id number]

Dictation # [id number]

jn

## 2017-05-17 NOTE — PN
DATE: 05/17/2017



SUBJECTIVE:  The patient is seen in the ICU.  He is sedated, he is on mechanical ventilation.  He is 
currently receiving dialysis.  He appears very swollen.  He has anasarca.  He is receiving 1 unit of 
blood during dialysis.  He is on a propofol drip.  He is on IV insulin at 5 units per hour.



PHYSICAL EXAMINATION:

GENERAL:  Middle-aged male lying in bed in the ICU, anasarcic.

VITAL SIGNS:  Blood pressure 119/54, heart rate 81, respiratory rate 38, temperature 97.8.

HEENT:  Normocephalic, atraumatic, positive pallor.

NECK:  Supple, no JVD.

LUNGS:  Bilateral equal air entry, bilateral equal expansion.

CARDIAC:  S1, S2, regular rate and rhythm, no murmur, no rub.

ABDOMEN:  Obese, distended, soft, nontender, bowel sounds present.

EXTREMITIES:  3+ pitting edema of the lower extremities.

INTAKE AND OUTPUT:  4541/2530.



LABORATORY DATA:  WBC 24.8, hemoglobin 8, hematocrit 25, platelets 154.  Sodium 132, potassium 4.5, c
hloride 88, CO2 26, BUN 42, creatinine 5.0, glucose 338, calcium 8.8, phosphorus 6.2, magnesium 2.0, 
total bilirubin 4.4.  AST 3295, ALT 2917, albumin 3.5.



Urine toxicology positive for opiates and benzodiazepines, Tylenol level less than 10, alcohol level 
less than 10.  Hepatitis serology negative.  Urinalysis:  Yellow, clear, pH 5.5, specific gravity 1.0
25, protein greater than 300, blood moderate, bilirubin small, leukocyte esterase trace.  Blood cultu
re negative.  Sputum culture negative.



CURRENT MEDICATIONS:  Hydralazine p.r.n., Diprivan, DuoNeb, Enulose, heparin 5000 subQ, insulin drip,
 meropenem 500 q. 12, Protonix, rifaximin, Zofran, doxycycline given yesterday 100 q. 12, vancomycin 
1 gram given this morning.



ASSESSMENT:  A 61-year-old male with acute liver failure, hepatic encephalopathy, respiratory failure
, adult respiratory distress syndrome, pulmonary edema, anasarca.

1.  Acute liver failure.  Etiology of acute liver injury is unclear.  Viral hepatitis titers are nega
tive.  Blood Tylenol level was less than 10.  Urine toxicology did show opiates and benzodiazepines i
n the urine.

2.  Acute respiratory failure, adult respiratory distress syndrome, pulmonary edema.

3.  Elevated troponins, secondary to acute liver injury/kidney injury.

4.  Severe anemia.

5.  End-stage renal disease.



PLAN:

1.  The patient is receiving dialysis at this time.  He is being dialyzed for 4 hours, we are trying 
to remove about 3 kilograms.  He is being dialyzed with a potassium 4 bath so as to not cause any pam
ctrolyte imbalance.

2.  Continue supportive care.

3.  Continue Mucomyst/acetylcysteine for acute liver failure.  

4.  Case discussed with ICU staff at length.

5.  Case discussed with dialysis staff.

6.  The patient is awaiting transfer to [facility] for possible liver transplant in the setting of fulminant
 hepatitis.



More than 35 minutes were spent in the care of this critically ill patient.





__________________________________________

Lavonne Maria MD







cc:



DD: 05/17/2017 15:30:58  [id number]

TT: 05/17/2017 16:39:11

Confirmation # [id number]

Dictation # [id number]

cn

## 2017-05-17 NOTE — US
HISTORY:

Eval GB/Liver



COMPARISON:

None.



TECHNIQUE:

Sonographic evaluation of the right upper quadrant of the abdomen.



FINDINGS:



LIVER:

Measures 17 cm in length. Increased echogenicity of the liver 

parenchyma.  No mass. No intrahepatic bile duct dilatation. 



GALLBLADDER:

Gallstones present. No sonographic Austin's sign elicited. Minimal 

amount of fluid around the gallbladder. Minimal gallbladder wall 

thickening (3.4 mm its) however the gallbladder is not especially at 

distended 



COMMON BILE DUCT:

Measures varies between 3.4 and 4.4 mm.  No stones. No dilatation.



PANCREAS:

Unremarkable as visualized. No mass. No ductal dilatation.



RIGHT KIDNEY:

Measures 9.4 x 4.7 x 4.6 cm in length. Diffuse increased echogenicity 

-can be seen with medical renal disease No calculus, mass, or 

hydronephrosis.



AORTA:

No aneurysmal dilatation.



IVC:

Unremarkable.



OTHER FINDINGS:

None .



IMPRESSION:

Hepatomegaly with probable diffuse fatty infiltration.



Limited gallbladder distension.  Gall stones, minimal -mild 

gallbladder wall edema -apparent minimal fluid around the 

gallbladder. Although no sonographic Austin sign was elicited- 

findings do not exclude a acute spurs -an acute/subacute or even 

chronic cholecystitis status. Hypoalbuminemic states with minimal 

ascites can simulate the gallbladder wall appearance.  No dilated 

ducts. Clinical follow-up recommended.

## 2017-05-17 NOTE — CP.PCM.PN
Subjective





- Date & Time of Evaluation


Date of Evaluation: 05/17/17


Time of Evaluation: 09:00





- Subjective


Subjective: 





Patient continues to be intubated and sedated.





Objective





- Vital Signs/Intake and Output


Vital Signs (last 24 hours): 


 











Temp Pulse Resp BP Pulse Ox


 


 98.7 F   82   38 H  130/60   100 


 


 05/17/17 20:00  05/17/17 20:01  05/17/17 20:00  05/17/17 20:01  05/17/17 20:01








Intake and Output: 


 











 05/17/17 05/18/17





 18:59 06:59


 


Intake Total 2036 73


 


Output Total 2900 


 


Balance -864 73














- Medications


Medications: 


 Current Medications





Albuterol/Ipratropium (Duoneb 3 Mg/0.5 Mg (3 Ml) Ud)  3 ml IH Q2H PRN


   PRN Reason: Shortness of Breath


Albuterol/Ipratropium (Duoneb 3 Mg/0.5 Mg (3 Ml) Ud)  3 ml IH [id number] [facility]


   Last Admin: 05/17/17 19:54 Dose:  3 ml


Heparin Sodium (Porcine) (Heparin)  5,000 units SC Q12 OSEAS


   PRN Reason: Protocol


   Last Admin: 05/17/17 09:00 Dose:  5,000 units


Hydralazine HCl (Apresoline)  10 mg IVP Q6 PRN


   PRN Reason: SBP >180


   Last Admin: 05/17/17 02:05 Dose:  10 mg


Propofol (Diprivan)  1,000 mg in 100 mls @ 3.674 mls/hr IV .Q24H PRN; Protocol; 

5 MCG/KG/MIN


   PRN Reason: TITRATE PER MD ORDER


   Last Admin: 05/17/17 18:35 Dose:  60 mcg/kg/min, 44.089 mls/hr


Meropenem 500 mg/ Sodium (Chloride)  100 mls @ 100 mls/hr IVPB Q12 [facility]


   Stop: 05/23/17 06:23


   Last Admin: 05/17/17 12:00 Dose:  100 mls/hr


Acetylcysteine 12,200 mg/ (Dextrose)  1,061 mls @ 66.313 mls/hr IV .Q16H ONE


   Stop: 05/17/17 23:51


   Last Admin: 05/17/17 09:28 Dose:  66.313 mls/hr


Insulin Human Regular 100 (units/ Sodium Chloride)  100 mls @ 5 mls/hr IV .Q20H 

PRN; Protocol; 5 UNITS/HR


   PRN Reason: TITRATE PER MD ORDER


   Last Titration: 05/17/17 20:00 Dose:  3 units/hr, 3 mls/hr


Insulin Human Regular (Humulin R Med)  0 units SC ACHS OSE[facility]


   PRN Reason: Protocol


   Last Admin: 05/16/17 21:58 Dose:  Not Given


Lactulose (Enulose)  20 gm PO Q8H [facility]


   Last Admin: 05/17/17 11:00 Dose:  20 gm


Ondansetron HCl (Zofran Inj)  4 mg IVP Q6H PRN


   PRN Reason: Nausea/Vomiting


Pantoprazole Sodium (Protonix Inj)  40 mg IVP DAILY [facility]


   Last Admin: 05/17/17 09:01 Dose:  40 mg


Rifaximin (Xifaxan)  550 mg PO BID [facility]


   PRN Reason: Protocol


   Last Admin: 05/17/17 18:34 Dose:  550 mg











- Labs


Labs: 


 





 05/17/17 06:10 





 05/17/17 19:52 





 











PT  17.6 Seconds (9.9-11.8)  H  05/17/17  06:10    


 


INR  1.63  (0.93-1.08)  H  05/17/17  06:10    


 


APTT  26.0 Seconds (23.7-30.8)   05/17/17  06:10    














- Constitutional


Appears: Other (Intubated and sedated)





- ENT Exam


Additional comments: 





ET tube in place





- Neck Exam


Neck Exam: absent: Lymphadenopathy, Meningismus





- Respiratory Exam


Respiratory Exam: Decreased Breath Sounds, Rales (scattered)





- Cardiovascular Exam


Cardiovascular Exam: +S1, +S2





- GI/Abdominal Exam


GI & Abdominal Exam: Soft.  absent: Tenderness





Assessment and Plan





- Assessment and Plan (Free Text)


Plan: 





Assessment


Severe sepsis with ventilator-dependent respiratory failure and acute liver 

failure/acute fulminant hepatitis due to bilateral healthcare-associated 

pneumonia with possible gram positive cocci and/or gram negative bacilli


HTN


ESRD on HD


dyslipidemia


morbid obesity with BMI 45 





Plan


will give intermittent IV Vancomycin and continue Merrem (day 2); pending final 

blood, urine, sputum cx results; we have discontinued Doxycycline since it may 

worsen the liver failure; reviewed CT scan which showed the bilateral 

consolidations and infiltrates in the lungs; reviewed ultrasound of the abdomen 

which showed the fatty liver and hepatomegaly


Patient is in critical condition and his overall prognosis is getting worse; 

awaiting transfer to a tertiary care center - discussed with Dr. Chandra

## 2017-05-17 NOTE — CP.PCM.PCO
Physician Communication Note





- Physician Communication Note


Physician Communication Note: Trach(Twin)-Gastrostomy(Dangelo)FRIDAY 7:

30am

## 2017-05-17 NOTE — CP.PCM.PCO
Physician Communication Note





- Physician Communication Note


Physician Communication Note: WRONG PATIENT-DISREGARD!

## 2017-05-17 NOTE — PN
DATE: 05/17/2017



The patient is still intubated and has been in the intensive care unit for the past 24 hours.  He has
 developed what appears to be an acute hepatic failure in addition to his chronic renal failure treat
ed with hemodialysis.



On admission, he was found to have stones in his gallbladder and questionable fluid surrounding the g
allbladder, but this clinician did not think this was representative of acute cholecystitis.  The CAT
 scan examination the day after admission and just today a repeat ultrasound examination does confirm
 that the patient did not have acute cholecystitis, but hepatic failure.  The ammonia level responded
 to lactulose and the patient's liver enzymes had risen to 7000.  However, at this point in time, he 
is preparing for transfer to a liver facility for critical care treatment.  The anticipated recovery 
is somewhat guarded at this point; however, his protime did drop from 21-17 seconds with 2 more units
 of fresh frozen plasma.



The etiology of the liver failure is still unclear and history is extremely difficult to obtain from 
this patient's family and acquaintances, and we suspect some drug or other toxic influence in the rec
ent history that is still unknown to us.



This dictation will be electronically signed without being read.





__________________________________________

Miguel Allan MD







cc:



DD: 05/17/2017 11:36:18  334

TT: 05/17/2017 11:54:53

Confirmation # [id number]

Dictation # [id number]

lakshmi

## 2017-05-17 NOTE — PN
DATE: 05/17/2017



The patient seen and examined at bedside.  He is comfortable.  He is on PRVC 350
/30/10/40%.  



PHYSICAL EXAMINATION:

VITAL SIGNS:  On that setting, his blood pressure 115/51 with mean arterial 
pressure 75, oxygen saturation 100, end-tidal CO2 on the monitor 49-53, heart 
rate 87.  The patient is on propofol 60 which was reduced to 30 mcg per 
kilogram per hour.

HEAD AND NECK:  Atraumatic.

LUNGS:  Clear to auscultation bilaterally.

HEART:  Regular rate and rhythm.  S1, S2 normal.

ABDOMEN:  Soft, nontender, nondistended.

MUSCULOSKELETAL:  Trace bilateral pedal and ankle edema.

NEUROLOGIC:  The patient is sedated.

SKIN:  Moist.

PSYCHIATRIC:  The patient is sedated.



LABORATORY DATA:  WBC 24.8, hemoglobin 8, platelet count 154.  Sodium 132, 
potassium 4.5, chloride 88, carbon dioxide 26, BUN 42, creatinine 5 (the 
patient is on dialysis), glucose 338.  AST 3295, ALT 2917, ammonia level 29, 
albumin 3.5, triglycerides 694, cholesterol 156, LDL 1130, lipase 45.  Blood 
gas 7.28/60/69 (minute ventilation was increased by a way of increasing 
respiratory rate from 30-35).



Hepatitis profile is negative.  INR 1.63 down from 1.84.



MEDICATIONS:  Mucomyst, DuoNeb p.r.n. and every 6 hours, heparin 5000 subQ q. 12
, hydralazine p.r.n., insulin, lactulose 20 grams p.o. q. 8, meropenem, Zofran 
p.r.n., Protonix, vitamin K, rifaximin, vancomycin.



ASSESSMENT AND PLAN:  This is a 61-year-old gentleman with acute liver failure, 
hepatic encephalopathy, adult respiratory distress syndrome/pulmonary edema.

1.  Neurologic:  The patient likely has hepatic encephalopathy.  Yesterday he 
was very synchronous with the ventilator which required propofol drip.  However
, today he looks much better from that perspective.  We will start tapering 
down propofol to assess his intrinsic mental status.  The patient is on 
lactulose and rifaximin.  His ammonia level normalized.  I will proceed with 
EEG to make sure he is not in nonconvulsive status.

2.  Pulmonary:  We will continue with protective lung ventilation strategy and 
tidal volume 6 mL per predicted body weight.  We will aim at pH 7.35-7.45 to 
optimally support cerebral perfusion/CBF.  We will increase minute ventilation 
to achieve this goal.  Meanwhile, we will go with a bit higher PEEP to FIO2 
ratio.  His FIO2 went down from 60% to 40%.  We will continue with euvolemia 
and conservative oxygen management.  We will continue with head of bed elevated 
more than 35 degrees.  The patient likely has aspiration pneumonitis versus 
pneumonia.  Possibility of community-acquired pneumonia cannot be ruled out as 
well.  The patient is on antibiotics for that and septic workup is in process.  

3.  Gastrointestinal:  The patient is n.p.o.  He has acute liver failure and is 
getting acetylcysteine.  Poison control contacted.  Gastroenterology service 
also contacted and appreciated.  Gastrointestinal prophylaxis is ongoing.  The 
patient was accepted to [facility] for consideration of liver 
transplant.  

4.  Renal:  The patient is on end-stage renal dialysis.  He received extra 
dialysis session yesterday to help with oxygenation/end-stage renal disease.

5.  Endocrine:  We will continue with blood glucose within 140-180 range.  The 
patient's triglycerides subsequently elevated as well as blood glucose came 
back as more than 300.  I will start insulin drip and Accu-Chek every 1 hour.

6.  Infectious disease:  The patient is afebrile.  Leukocytosis rising.  He is 
on broad-spectrum antibiotics.  Infectious disease service is following him as 
well.  Septic workup is in progress.  Blood culture and sputum culture negative 
so far.  We will continue with deep venous thrombosis and gastrointestinal 
prophylaxis.



ccm time 40 min





__________________________________________

Joss Chandra MD







cc:   



DD: 05/17/2017 08:49:08  [id number]

TT: 05/17/2017 09:36:02

Confirmation # [id number]

Dictation # [id number]

melody STEWART

## 2017-05-17 NOTE — RAD
HISTORY:

ARDS  



COMPARISON:

05/16/2017 



FINDINGS:



LUNGS:

Increasing vascular congestion and bilateral infiltrates



PLEURA:

No significant pleural effusion identified, no pneumothorax apparent.



CARDIOVASCULAR:

Moderate to severe cardiomegaly



OSSEOUS STRUCTURES:

No significant abnormalities.



VISUALIZED UPPER ABDOMEN:

Normal.



OTHER FINDINGS:

Endotracheal and nasogastric tubes in satisfactory position



IMPRESSION:

Increasing infiltrate and vascular congestion.  Severe cardiomegaly

## 2017-05-17 NOTE — PN
DATE: 05/17/2017



SUBJECTIVE:  The patient seen and examined while in the CCU; remains critically sick.  He is intubate
d.  He is sedated.  Awaiting bed in [facility].



PHYSICAL EXAMINATION:

VITAL SIGNS:  The patient is afebrile, pulse 79, respirations 20, FIO2 40% and saturation is 100%.

LUNGS:  Bilateral fair airflow, no rhonchi or crackle.

HEART:  S1, S2 audible.  Tachycardic.

ABDOMEN:  Soft, obese, nontender.

NEUROLOGIC:  He is sedated.

EXTREMITIES:  Bilateral legs, no edema.



LABORATORY EXAMINATION:  WBC is 24.8, hemoglobin 8.0, hematocrit 25.2, platelet of 180.  PT 17.6, INR
 1.63.  Chemistry:  Sodium 132, potassium 4.5, chloride 88, CO2 26, BUN 42, creatinine 5.0, blood sug
ar of 338.  Urine drug screen positive for opiates.  Hepatitis profile is negative.



ASSESSMENT AND PLAN:

1.  Respiratory failure, on vent support.

2.  Hypertension.

3.  End-stage renal disease, on hemodialysis.

4.  Acute hepatitis, etiology unclear.

5.  Coagulopathy.

6.  Cholelithiasis but not cholecystitis.



PLAN:  He will stay n.p.o.  Will continue vent support.  He is getting dialysis today.  Continue to m
onitor his blood pressure and electrolytes.  Will follow up this patient in a.m.





__________________________________________

Antonieta Rae MD







cc:



DD: 05/17/2017 12:18:08  [facility]

TT: 05/17/2017 12:52:05

Confirmation # [id number]

Dictation # [id number]

mn

## 2017-05-17 NOTE — CP.PCM.PN
<Parmjit Tirado - Last Filed: 05/17/17 10:09>





Subjective





- Date & Time of Evaluation


Date of Evaluation: 05/17/17


Time of Evaluation: 10:00





- Subjective


Subjective: 





GI progress note for Dr. Morales





Pt s &e w attending. Pt is intubated. Unresponsive to pain or verbal stimuli. 

OG tube in place.   





Objective





- Vital Signs/Intake and Output


Vital Signs (last 24 hours): 


 











Temp Pulse Resp BP Pulse Ox


 


 99.3 F   95 H  22   160/66 H  100 


 


 05/17/17 04:00  05/17/17 03:10  05/16/17 16:00  05/17/17 03:01  05/17/17 03:10








Intake and Output: 


 











 05/17/17 05/17/17





 06:59 18:59


 


Intake Total 1860 100


 


Balance 1860 100














- Medications


Medications: 


 Current Medications





Albuterol/Ipratropium (Duoneb 3 Mg/0.5 Mg (3 Ml) Ud)  3 ml IH Q2H PRN


   PRN Reason: Shortness of Breath


Albuterol/Ipratropium (Duoneb 3 Mg/0.5 Mg (3 Ml) Ud)  3 ml IH [id number] OSEAS


   Last Admin: 05/17/17 01:10 Dose:  3 ml


Heparin Sodium (Porcine) (Heparin)  5,000 units SC Q12 OSEAS


   PRN Reason: Protocol


   Last Admin: 05/17/17 09:00 Dose:  5,000 units


Hydralazine HCl (Apresoline)  10 mg IVP Q6 PRN


   PRN Reason: SBP >180


   Last Admin: 05/17/17 02:05 Dose:  10 mg


Propofol (Diprivan)  1,000 mg in 100 mls @ 3.674 mls/hr IV .Q24H PRN; Protocol; 

5 MCG/KG/MIN


   PRN Reason: TITRATE PER MD ORDER


   Last Admin: 05/17/17 09:28 Dose:  60 mcg/kg/min, 44.089 mls/hr


Meropenem 500 mg/ Sodium (Chloride)  100 mls @ 100 mls/hr IVPB Q12 OSEAS


   Stop: 05/23/17 06:23


   Last Admin: 05/16/17 21:54 Dose:  100 mls/hr


Acetylcysteine 12,200 mg/ (Dextrose)  1,061 mls @ 66.313 mls/hr IV .Q16H ONE


   Stop: 05/17/17 23:51


   Last Admin: 05/17/17 09:28 Dose:  66.313 mls/hr


Insulin Human Regular 100 (units/ Sodium Chloride)  100 mls @ 5 mls/hr IV .Q20H 

PRN; Protocol; 5 UNITS/HR


   PRN Reason: TITRATE PER MD ORDER


Insulin Human Regular (Humulin R Med)  0 units SC ACHS OSEAS


   PRN Reason: Protocol


   Last Admin: 05/16/17 21:58 Dose:  Not Given


Lactulose (Enulose)  20 gm PO Q8H [facility]


   Last Admin: 05/17/17 03:23 Dose:  20 gm


Ondansetron HCl (Zofran Inj)  4 mg IVP Q6H PRN


   PRN Reason: Nausea/Vomiting


Pantoprazole Sodium (Protonix Inj)  40 mg IVP DAILY [facility]


   Last Admin: 05/17/17 09:01 Dose:  40 mg


Rifaximin (Xifaxan)  550 mg PO BID [facility]


   PRN Reason: Protocol


   Last Admin: 05/17/17 09:01 Dose:  550 mg











- Labs


Labs: 


 





 05/17/17 06:10 





 05/17/17 06:10 





 











PT  17.6 Seconds (9.9-11.8)  H  05/17/17  06:10    


 


INR  1.63  (0.93-1.08)  H  05/17/17  06:10    


 


APTT  26.0 Seconds (23.7-30.8)   05/17/17  06:10    














- Head Exam


Head Exam: ATRAUMATIC, NORMAL INSPECTION, NORMOCEPHALIC





- ENT Exam


ENT Exam: Mucous Membranes Moist, Normal Exam


Additional comments: 





Intubated. oG tube in place





- Neck Exam


Neck Exam: Normal Inspection





- Respiratory Exam


Respiratory Exam: Respiratory Distress





- Cardiovascular Exam


Cardiovascular Exam: REGULAR RHYTHM





- GI/Abdominal Exam


GI & Abdominal Exam: Soft.  absent: Distended, Firm, Guarding, Rigid, Tenderness





-  Exam


 Exam: NORMAL INSPECTION





- Extremities Exam


Extremities Exam: Normal Capillary Refill





- Back Exam


Back Exam: NORMAL INSPECTION





- Skin


Skin Exam: Dry, Intact, Normal Color, Warm





Assessment and Plan





- Assessment and Plan (Free Text)


Assessment: 





61 M w PMD ESRD on HD , DM , CAD, GERD came with vomiting and respiratory 

distress. Pt is intubated. found to have elevated LFT and ammonia (63->29). 


Hep panel is negative. MELD score: 32 -> 50% 3month mortality. 


Differential dx: Acetominophen induced acute hepatic failure , hepatic 

encephalopathy





Plan


-Continue Lactulose, Rifaximin


-Acetylcystein drip given 


-Trend ammonia level / LFT 


-ICU management 


-Awaiting bed at [facility]. 


-We will continue to follow closely 





Case Discussed with Dr. Morales 





 





 





<Leydi Morales - Last Filed: 05/17/17 15:26>





Objective





- Vital Signs/Intake and Output


Vital Signs (last 24 hours): 


 











Temp Pulse Resp BP Pulse Ox


 


 97.8 F   81   38 H  119/54 L  100 


 


 05/17/17 14:48  05/17/17 15:10  05/17/17 14:48  05/17/17 15:00  05/17/17 15:10








Intake and Output: 


 











 05/17/17 05/17/17





 06:59 18:59


 


Intake Total 1860 500


 


Balance 1860 500














- Medications


Medications: 


 Current Medications





Albuterol/Ipratropium (Duoneb 3 Mg/0.5 Mg (3 Ml) Ud)  3 ml IH Q2H PRN


   PRN Reason: Shortness of Breath


Albuterol/Ipratropium (Duoneb 3 Mg/0.5 Mg (3 Ml) Ud)  3 ml IH [id number] OSE[facility]


   Last Admin: 05/17/17 13:30 Dose:  3 ml


Heparin Sodium (Porcine) (Heparin)  5,000 units SC Q12 [facility]


   PRN Reason: Protocol


   Last Admin: 05/17/17 09:00 Dose:  5,000 units


Hydralazine HCl (Apresoline)  10 mg IVP Q6 PRN


   PRN Reason: SBP >180


   Last Admin: 05/17/17 02:05 Dose:  10 mg


Propofol (Diprivan)  1,000 mg in 100 mls @ 3.674 mls/hr IV .Q24H PRN; Protocol; 

5 MCG/KG/MIN


   PRN Reason: TITRATE PER MD ORDER


   Last Admin: 05/17/17 14:08 Dose:  60 mcg/kg/min, 44.089 mls/hr


Meropenem 500 mg/ Sodium (Chloride)  100 mls @ 100 mls/hr IVPB Q12 [facility]


   Stop: 05/23/17 06:23


   Last Admin: 05/17/17 12:00 Dose:  100 mls/hr


Acetylcysteine 12,200 mg/ (Dextrose)  1,061 mls @ 66.313 mls/hr IV .Q16H [facility]


   Stop: 05/17/17 23:51


   Last Admin: 05/17/17 09:28 Dose:  66.313 mls/hr


Insulin Human Regular 100 (units/ Sodium Chloride)  100 mls @ 5 mls/hr IV .Q20H 

PRN; Protocol; 5 UNITS/HR


   PRN Reason: TITRATE PER MD ORDER


   Last Titration: 05/17/17 15:00 Dose:  1 units/hr, 1 mls/hr


Insulin Human Regular (Humulin R Med)  0 units SC ACHS [facility]


   PRN Reason: Protocol


   Last Admin: 05/16/17 21:58 Dose:  Not Given


Lactulose (Enulose)  20 gm PO Q8H [facility]


   Last Admin: 05/17/17 11:00 Dose:  20 gm


Ondansetron HCl (Zofran Inj)  4 mg IVP Q6H PRN


   PRN Reason: Nausea/Vomiting


Pantoprazole Sodium (Protonix Inj)  40 mg IVP DAILY [facility]


   Last Admin: 05/17/17 09:01 Dose:  40 mg


Rifaximin (Xifaxan)  550 mg PO BID [facility]


   PRN Reason: Protocol


   Last Admin: 05/17/17 09:01 Dose:  550 mg











- Labs


Labs: 


 





 05/17/17 06:10 





 05/17/17 06:10 





 











PT  17.6 Seconds (9.9-11.8)  H  05/17/17  06:10    


 


INR  1.63  (0.93-1.08)  H  05/17/17  06:10    


 


APTT  26.0 Seconds (23.7-30.8)   05/17/17  06:10    














Assessment and Plan





- Assessment and Plan (Free Text)


Assessment: 





This is and addendum note to Dr. Parmjit Tirado, resident progress note. Patient was 

seen and examined earlier this morning. Chart was reviewed, labs ,and 

diagnostic tests. Remains on ventilator unresponsive. LFT elevated on 

Acetylcystein drip. Awaiting transfer to [facility]. MELD is 32 with 50 % 3  month 

mortality. Agree with the plan as outlined above, will continue to follow 

closely.

## 2017-05-17 NOTE — CON
DATE: 05/16/2017



This is an addendum to the GI consultation report dictated by LAURA Flannery.
  



This 61-year-old patient with a past medical history of end-stage renal disease
, on hemodialysis; history of noninsulin-dependent diabetes mellitus, 
hypertension; nonobstructive coronary artery disease, history of valvular 
disease, MR, TR; gastroesophageal reflux disease, anemia, initially presented 
to the Emergency Room with the complaints of abdominal discomfort, nausea, 
vomiting.  The patient recently returned from the trip from [facility].  The 
patient was found to be in respiratory distress in the ER, got intubated.  The 
patient was found to have an elevated ammonia.  Found to have elevated LFTs and 
GI consult was requested because of that.



OTHER PAST MEDICAL HISTORY:  Significant as above.  



SOCIAL HISTORY:  No alcohol and denies smoking.



FAMILY HISTORY:  Noncontributory.



REVIEW OF SYSTEMS:  Positive as above.  Other systems reviewed.



ALLERGIES:  ALLERGIC TO PENICILLIN.  



PHYSICAL EXAMINATION:  

HEENT:  Atraumatic, anicteric.

NECK:  Supple.

HEART:  S1, S2 heard.

LUNGS:  Bilateral air entry present.  ____ at the base with a few crackles.

HEART:  S1, S2 are heard.  There is a systolic murmur present.

ABDOMEN:  Softly distended.

EXTREMITIES:  Bilateral mild pedal edema present.  The patient is on ____ now.  



LABORATORY DATA:  WBC 19, hemoglobin 8.9, hematocrit 27.8, platelets 120.  INR 
1.94.  AST 7037, ALT 3903.  Alkaline phosphatase normal at 1.8.  The patient's 
INR is elevated at 1.94.  Hepatitis serology:  Negative.  The patient had a CT 
of the abdomen and pelvis and chest and mild ascites is present with 
cholelithiasis, extensive bilateral lung infiltrate. 



IMPRESSION:  This 61-year-old patient with end-stage liver disease, on 
hemodialysis, admitted with nausea, vomiting and respiratory distress, 
currently intubated.  The patient does have bilateral lung infiltrate, found to 
be acute pulmonary edema, rule out pneumonia.  The patient previously, a week 
ago, had some flu-like symptoms; had Z-Jaylon. Have H/O increase use Acetaminophen 
and was also taking Percocet. Over the weekend he went [facility], came back and 
was feeling weak, nausea, vomiting; that is why he came to the Emergency Room, 
got intubated.  Other possibilities to consider,  Rule out sepsis, rule out 
pneumonia, acute pulmonary edema, respiratory failure; acute liver failure, 
etiology unclear; on ventilator; end-stage renal disease, on hemodialysis; 
morbidly obese.  This 61-year-old patient with acute liver failure.  The 
differential diagnosis should include drug induced (Tylenol) also should be 
considered definitive diagnosis of drug induced.  The other differential 
diagnosis could include history of  viral hepatitis.



RECOMMEND:

1.  ____ IgM, hepatitis B core IgM and also hepatitis A IgM ____.

2.  Follow up of the hemoglobin and hematocrit.

3.  Discussed with Dr. Chandra, intensivist, in the a.m., and also I have 
discussed this case with Dr. Rae.  



PLAN:  

1.  Is the patient is now being accepted for transfer to the [facility].  Meanwhile, 
will continue the acetylcysteine drip.  

2.  Followup of the hemoglobin, hematocrit and INR.  

3.  At the present time, will continue the Mucomyst.  

4.  Follow up the LFTs.  

5.  Awaiting for the patient to go to the [facility] where he has been accepted.  

6.  Will continue to closely follow up his care and suggest further management 
based on the clinical course.





__________________________________________

Leydi Morales MD







cc:   



DD: 05/16/2017 23:59:29  [id number]

TT: 05/17/2017 08:11:24

Confirmation # [id number]

Dictation # [id number]

mc STEWART

## 2017-05-18 LAB — ADD MANUAL DIFF?: NO

## 2017-05-18 NOTE — PN
DATE: 05/18/2017



LOCATION:  CCU, room #129, bed 6.



The patient was seen earlier this morning in [facility].



SUBJECTIVE:  The patient continues to be intubated on the ventilator and still sedated.  Has not been
 febrile overnight, but still poorly responsive.



OBJECTIVE:

VITAL SIGNS:  The patient has a blood pressure of 120s/70s, heart rate 118, respiratory rate 20.

HEAD AND NECK:  Normocephalic, atraumatic.  ET tube in place in the mouth.

CHEST:  Decreased breath sounds bilaterally with diffuse crackles and rales noted.

ABDOMEN:  Soft.  It is nontender.  There is no hepatomegaly.

EXTREMITIES:  The patient has a left upper arm AV fistula with a good thrill.



LABORATORY DATA:  Unfortunately, we are unable to review the labs because the computer system is curr
ently down.



ASSESSMENT:  This is a 61-year-old male with past medical history of end-stage renal disease on hemod
ialysis, morbid obesity who came in with weakness and altered mental status.  We are currently treati
ng for severe sepsis with ventilator-dependent respiratory failure as well as acute hepatic failure a
nd acute fulminant hepatitis and the sepsis is due to bilateral healthcare-associated pneumonia with 
possible gram-positive cocci and/or gram-negative bacilli.



PLAN:  We are continuing the patient on intermittent vancomycin and meropenem.  We have discontinued 
doxycycline several days ago because doxycycline may worsen the patient's liver failure.  Currently, 
the cultures are negative.  We are awaiting possible transfer of the patient to a tertiary care [facility] to take care of his acute fulminant hepatitis.  The patient continues to be on the ventilator and t
he overall prognosis for this patient is becoming worse.  We will continue to monitor this patient an
d we will follow the GI recommendations for the acute fulminant hepatitis.  Of note, viral serologies
 have been negative for this patient and we are still unclear as to what is the etiology of his liver
 failure.





__________________________________________

Isai Segura M.D.







cc:



DD: 05/18/2017 13:14:11  [id number]

TT: 05/18/2017 13:56:14

Confirmation # [id number]

Dictation # [id number]

lakshmi

## 2017-05-18 NOTE — PN
DATE: 05/18/2017



REASON FOR CONSULTATION AND FOLLOWUP:  Status post respiratory failure, intubated, acute liver failur
e, cardiac evaluation, borderline troponin positive, secondary to end-stage renal disease.



BRIEF CLINICAL HISTORY:  A 61-year-old morbidly obese male with past medical history of diabetes, hyp
ertension, end-stage renal disease on dialysis, history of cardiac catheterization being of this year
, nonobstructive coronary artery disease who admitted having a cold flu-like symptom.  Respiratory st
atus deteriorated requiring intubation in the Emergency Room, found to be acute liver failure, awaiti
ng to be transferred to [facility] for liver transplant evaluation.  The patient is currently on vent.  Now 
the liver function is improving.  Computer system [employer] is down.  Lab today is not available, but 
yesterday, LFTs trended down.



PHYSICAL EXAMINATION:

VITAL SIGNS:  Heart rate 89, blood pressure 110/80.

HEENT:  PERRLA.  Extraocular muscles intact.

NECK:  Supple.  No carotid bruits.  No thyromegaly.

CHEST:  Clear to auscultation.

HEART:  S1, S2 regular.

ABDOMEN:  Soft.

EXTREMITIES:  Clubbing and cyanosis negative.



LABORATORY DATA:  Blood workup pending, as [employer] system is down.



IMPRESSION:  Status post respiratory failure, intubated, acute liver failure, borderline troponin pos
itive on admission secondary to end-stage renal disease, and no evidence of acute coronary syndrome, 
no evidence of ischemia, no evidence of myocardial infarction.  History of cardiac catheterization be
ginning of the nonobstructive coronary artery disease.



RECOMMENDATION:  Continue vent management.  Keep dry.  Keep a negative fluid balance, and oxygenation
.  When a bed is available, the patient was transferred to [facility].  Now the liver function is significan
tly improved.  Awaiting for the repeat lab.  We will follow the lab when the [employer] system is avail
able or the lab is available.  



PROGRESS NOTE WAS DICTATED FROM THE TOP OF THE HEAD, AS [employer] SYSTEM IS DOWN.  



We will follow.



Thank you, Dr. Rae, for providing the opportunity in taking care of the patient.





__________________________________________

Sunil Thompson MD







cc:



DD: 05/18/2017 11:10:21  [id number]

TT: 05/18/2017 11:47:45

Confirmation # [id number]

Dictation # [id number]

jn

## 2017-05-18 NOTE — CARD
--------------- APPROVED REPORT --------------





EXAM: Two-dimensional and M-mode echocardiogram with Doppler and 

color Doppler.



INDICATION

Chest Pain 



2D DIMENSIONS 

Left Atrium (2D)5.1   (1.6-4.0cm)IVSd1.2   (0.7-1.1cm)

LVDd5.4   (3.9-5.9cm)PWd1.3   (0.7-1.1cm)

LVDs4.3   (2.5-4.0cm)FS (%) 21.0   %

LVEF (%)42.3   (>50%)



M-Mode DIMENSIONS 

Aortic Root2.70   (2.2-3.7cm)Aortic Cusp Exc.1.90   (1.5-2.0cm)



Aortic Valve

AoV Peak Yisxrpod943.0cm/Moi Peak GR.12mmHg



Mitral Valve

MV E Pggdhcqh781.0cm/sMV A Pdophtbx71.7cm/sE/A ratio1.1



TDI

E/Lateral E'0.0E/Medial E'0.0



Tricuspid Valve

TR Peak Mcvozrrg587nf/sRAP OOPXRJOS43puFcWM Peak Gr.35mmHg

NCRD73miBm



 LEFT VENTRICLE 

The left ventricle is normal size. There is mild concentric left 

ventricular hypertrophy. The systolic function is mildly 

impaired.EF-45-50% Mild regional wall motion abnormalities noted. 

Transmitral Doppler flow pattern is Grade II-pseudonormal filling 

dynamics. No left ventricle thrombus noted on this study. There is no 

ventricular septal defect visualized. There is no left ventricular 

aneurysm. There is no mass noted in the left ventricle.



 RIGHT VENTRICLE 

The right ventricle is mildly to moderately dilated. The right 

ventricle is mildly to moderately hypertrophied. Systolic function of 

RV is mildly to moderately reduced.



 ATRIA 

The left atrium is moderately dilated. The right atrium is mildly 

dilated. The interatrial septum is intact with no evidence for an 

atrial septal defect.



 AORTIC VALVE 

The aortic valve is thickened but opens well. There is trace to mild 

aortic regurgitation. There is no aortic valvular stenosis. There is 

no aortic valvular vegetation.



 MITRAL VALVE 

The mitral valve is thickened but opens well. Mitral regurgitation is 

mild. There is no mitral valve stenosis. There is no evidence of 

mitral valve prolapse.



 TRICUSPID VALVE 

The tricuspid valve leaflets are thickened , but open well. There is 

mild to moderate tricuspid regurgitation.RVSP-45 mmof Hg. There is no 

tricuspid valve stenosis. There is no tricuspid valve prolapse or 

vegetation.



 PULMONIC VALVE 

The pulmonic valve is borderline thickened. There is trace to mild 

pulmonic valvular regurgitation. There is no pulmonic valvular 

stenosis.



 GREAT VESSELS 

The aortic root is normal in size. The ascending aorta is normal in 

size. The pulmonary artery is normal. The IVC is dilated.



 PERICARDIAL EFFUSION 

There is no pleural effusion. There is no pericardial effusion.



<Conclusion>

The left ventricle is normal size.

There is mild concentric left ventricular hypertrophy.

The systolic function is mildly impaired.EF-45-50%

Mild  regional wall motion abnormalities noted.

The right ventricle is mildly to moderately dilated.

Systolic function of RV is mildly to moderately reduced.

There is trace to mild aortic regurgitation.

Mitral regurgitation is mild.

There is mild to moderate tricuspid regurgitation.RVSP-45 mmof Hg.

The IVC is dilated.

There is no pericardial effusion.

## 2017-05-19 LAB
ACETONE SERPL-MCNC: 6 MG/DL
ADD MANUAL DIFF?: NO
ADD MANUAL DIFF?: NO
ALBUMIN/GLOB SERPL: 0.7 {RATIO}
ALBUMIN/GLOB SERPL: 0.7 {RATIO}
ALP SERPL-CCNC: 161 U/L
ALP SERPL-CCNC: 171 U/L
ALT SERPL-CCNC: 1328 U/L
ALT SERPL-CCNC: 1892 U/L
APTT BLD: 26.8 SECONDS
AST SERPL-CCNC: 1193 U/L
AST SERPL-CCNC: 1573 U/L
BILIRUB SERPL-MCNC: 5.5 MG/DL
BILIRUB SERPL-MCNC: 6.2 MG/DL
BUN SERPL-MCNC: 36 MG/DL
BUN SERPL-MCNC: 57 MG/DL
CALCIUM SERPL-MCNC: 10.3 MG/DL
CALCIUM SERPL-MCNC: 9.9 MG/DL
CHLORIDE SERPL-SCNC: 91 MMOL/L
CHLORIDE SERPL-SCNC: 94 MMOL/L
CO2 SERPL-SCNC: 22 MMOL/L
CO2 SERPL-SCNC: 24 MMOL/L
ERYTHROCYTE [DISTWIDTH] IN BLOOD BY AUTOMATED COUNT: 19.4 %
ERYTHROCYTE [DISTWIDTH] IN BLOOD BY AUTOMATED COUNT: 19.4 %
ERYTHROCYTE [DISTWIDTH] IN BLOOD BY AUTOMATED COUNT: 19.6 %
GLOBULIN SER-MCNC: 5.2 GM/DL
GLOBULIN SER-MCNC: 5.3 GM/DL
GLUCOSE SERPL-MCNC: 137 MG/DL
GLUCOSE SERPL-MCNC: 235 MG/DL
HCO3 BLDA-SCNC: 23.7 MMOL/L
HCO3 BLDA-SCNC: 25 MMOL/L
HCO3 BLDA-SCNC: 27.9 MMOL/L
HCT VFR BLD CALC: 23.1 %
HCT VFR BLD CALC: 23.8 %
HCT VFR BLD CALC: 25.8 %
INR PPP: 1.26
INR PPP: 1.31
MAGNESIUM SERPL-MCNC: 2 MG/DL
MCH RBC QN AUTO: 30.2 PG
MCH RBC QN AUTO: 30.4 PG
MCH RBC QN AUTO: 30.8 PG
MCHC RBC AUTO-ENTMCNC: 32.5 G/DL
MCHC RBC AUTO-ENTMCNC: 32.6 G/DL
MCHC RBC AUTO-ENTMCNC: 33.6 G/DL
MCV RBC AUTO: 91.5 FL
MCV RBC AUTO: 93.1 FL
MCV RBC AUTO: 93.5 FL
PH BLDA: 7.22 [PH]
PH BLDA: 7.31 [PH]
PH BLDA: 7.33 [PH]
PHOSPHATE SERPL-MCNC: 4.1 MG/DL
PLATELET # BLD: 128 10^3/UL
PLATELET # BLD: 143 10^3/UL
PLATELET # BLD: 144 10^3/UL
PMV BLD AUTO: 10.2 FL
PMV BLD AUTO: 9.5 FL
PMV BLD AUTO: 9.7 FL
PO2 BLDA: 103 MM/HG
PO2 BLDA: 115 MM/HG
PO2 BLDA: 208 MM/HG
POTASSIUM SERPL-SCNC: 4.4 MMOL/L
POTASSIUM SERPL-SCNC: 4.7 MMOL/L
PROT SERPL-MCNC: 8.9 G/DL
PROT SERPL-MCNC: 9 G/DL
SODIUM SERPL-SCNC: 131 MMOL/L
SODIUM SERPL-SCNC: 134 MMOL/L
WBC # BLD AUTO: 37.9 10^3/UL
WBC # BLD AUTO: 67.4 10^3/UL
WBC # BLD AUTO: 67.5 10^3/UL

## 2017-05-19 PROCEDURE — 3E03328 INTRODUCTION OF OXAZOLIDINONES INTO PERIPHERAL VEIN, PERCUTANEOUS APPROACH: ICD-10-PCS | Performed by: INTERNAL MEDICINE

## 2017-05-19 NOTE — PN
DATE: 05/19/2017



SUBJECTIVE:  The patient is seen in the ICU.  He remains on mechanical 
ventilation.  He is sedated with propofol.  He still appears to be anasarcic.  
Face is very puffy.  Periorbital edema.  He remains on Mucomyst IV, he remains 
on IV insulin drip.



PHYSICAL EXAMINATION:

GENERAL:  Obese, middle-aged male lying in bed in the ICU, on mechanical 
ventilation.

VITAL SIGNS:  Blood pressure 155/65, heart rate 85, respiratory rate 18, 
temperature 98.6.

HEENT:  Normocephalic and atraumatic.

NECK:  Positive pallor.

NECK:  Supple, no JVD.

LUNGS:  Bilateral equal air entry, bilateral equal expansion.

CARDIAC:  S1, S2, regular rate and rhythm, no murmur, no rub.

ABDOMEN:  Obese, distended, soft, nontender, bowel sounds present.

EXTREMITIES:  2+ pitting edema of the lower extremities.

INTAKE AND OUTPUT:  1585/not charted.



LABORATORY DATA:  WBC 67.5, hemoglobin 8, hematocrit 24, platelets 143.  Sodium 
131, potassium 4.4, chloride 91, CO2 22, BUN 57, creatinine _____, glucose 235, 
calcium 10.3, AST 1193, ALT 1328, total bili 5.5.  INR 1.2.  PH 7.2, pCO2 of 61
, pO2 208.  Sputum culture MRSA.



CURRENT MEDICATIONS:  Mucomyst, hydralazine, Diprivan, DuoNeb, lactulose, Flagyl
, heparin, insulin drip, Levemir, meropenem 500 q. 12, Protonix, vancomycin 500 
q.i.d. oral, rifaximin, Zofran.



ASSESSMENT:

1.  Acute fulminant hepatitis, etiology likely sepsis versus drug-induced.

2.  Severe leukocytosis, source unclear.

3.  Severe anemia.

4.  Respiratory failure.

5.  Anasarca.

6.  Hypertension.

7.  End-stage renal disease. 



PLAN:

1.  Ultrafiltration today, will try to remove 2-1/2 kilos.

2.  Continue supportive care for an acute fulminant hepatitis/hepatitic failure.

3.  Continue antibiotics for broad spectrum coverage.  

4.  Possible bronchoscopy today.

5.  CT scan of the abdomen.



The case discussed with Dr. Morales, case discussed with Dr. Tian.  He remains 
critically ill, More than 35 minutes were spent in the care of this patient.





__________________________________________

Lavonne Maria MD







cc:   



DD: 05/19/2017 17:43:27  [id number]

TT: 05/19/2017 20:40:28

Confirmation # [id number]

Dictation # [id number]

mc STEWART

## 2017-05-19 NOTE — RAD
HISTORY:

respiratory failure, on vent  



COMPARISON:

05/17/2017 



FINDINGS:



LUNGS:

No active pulmonary disease.



PLEURA:

No significant pleural effusion identified, no pneumothorax apparent.



CARDIOVASCULAR:

There is moderate to severe cardiomegaly.  There is severe vascular 

congestion that has shown improvement



OSSEOUS STRUCTURES:

No significant abnormalities.



VISUALIZED UPPER ABDOMEN:

Normal.



OTHER FINDINGS:

None.



IMPRESSION:

Severe cardiomegaly and severe vascular congestion showing 

improvement

## 2017-05-19 NOTE — CP.CCUPN
CCU Subjective





- Physician Review


Events Since Last Encounter (Free Text): 





05/19/17 17:49


60 y/o M that I am seeing for the first time. 


Intubated and sedated for hypoxemic failure in the setting of Renal failure and 

acute hepatic failure.


Finishing NAC protocol from uncertain cause. 


Marked elevation of WBC overnight. 


Afebrile and normotensive. 





CCU Objective





- Vital Signs / Intake & Output


Vital Signs (Last 4 hours): 


Vital Signs











  Pulse BP Pulse Ox


 


 05/19/17 15:01  85  155/65 H  92 L


 


 05/19/17 15:00  84   92 L


 


 05/19/17 14:01  86  159/71 H  86 L


 


 05/19/17 14:00  85   87 L











Intake and Output (Last 8hrs): 


 Intake & Output











 05/19/17 05/19/17 05/19/17





 06:59 14:59 22:59


 


Intake Total 1585 200 


 


Balance 1585 200 


 


Weight  283 lb 15.286 oz 


 


Intake:   


 


  IV 1585 200 


 


      


 


    ROSENDA 663  


 


    rfa 522  


 


  Oral 0  


 


  Tube Feeding 0  


 


Other:   


 


  # Voids   


 


    Straight 0  


 


  # Bowel Movements 3  














- Physical Exam


Head: Positive for: Atraumatic, Normocephalic.  Negative for: Tenderness, 

Contusion, Swelling, Ecchymosis, Abrasion, Laceration, Other


Pupils: Positive for: PERRL.  Negative for: Sluggish, Non-Reactive, Pinpoint, 

Other


Extroacular Muscles: Positive for: EOMI


Conjunctiva: Positive for: Normal


Mouth: Positive for: Moist Mucous Membranes


Pharnyx: Negative for: ERYTHEMA, EXUDATE, TONSILS ENLARGED


Neck: Positive for: Normal Range of Motion


Respiratory/Chest: Positive for: Clear to Auscultation, Good Air Exchange, 

Decreased Breath Sounds, Rales, Rhonchi.  Negative for: Respiratory Distress, 

Accessory Muscle Use


Cardiovascular: Positive for: Regular Rate and Rhythm, Normal S1, S2.  Negative 

for: Murmurs


Abdomen: Positive for: Normal Bowel Sounds, Other (abd pain on palpation).  

Negative for: Tenderness, Distention, Peritoneal Signs, Rebound, Guarding


Back: Positive for: Normal Inspection.  Negative for: CVA Tenderness, Midline 

Tenderness, Paraspinal Tenderness


Upper Extremity: Positive for: Normal Inspection.  Negative for: Cyanosis, Edema


Lower Extremity: Positive for: Normal Inspection.  Negative for: Edema, CALF 

TENDERNESS


Neurological: Positive for: GCS=15, CN II-XII Intact, Speech Normal, Motor Func 

Grossly Intact, Other (sedated unable to do full neuro exam. )


Skin: Positive for: Warm, Dry, Normal Color.  Negative for: Rashes


Psychiatric: Positive for: Alert, Oriented x 3, Normal Insight, Normal 

Concentration, Lethargic





- Medications


Active Medications: 


Active Medications











Generic Name Dose Route Start Last Admin





  Trade Name Freq  PRN Reason Stop Dose Admin


 


Albuterol/Ipratropium  3 ml  05/16/17 00:33  





  Duoneb 3 Mg/0.5 Mg (3 Ml) Ud  IH   





  Q2H PRN   





  Shortness of Breath   


 


Albuterol/Ipratropium  3 ml  05/16/17 02:00  05/19/17 13:44





  Duoneb 3 Mg/0.5 Mg (3 Ml) Ud  IH   3 ml





  [id number] OSEAS   Administration


 


Heparin Sodium (Porcine)  5,000 units  05/16/17 10:00  05/19/17 09:07





  Heparin  SC   5,000 units





  Q12 OSEAS   Administration





  Protocol   


 


Hydralazine HCl  10 mg  05/16/17 02:36  05/17/17 02:05





  Apresoline  IVP   10 mg





  Q6 PRN   Administration





  SBP >180   


 


Propofol  1,000 mg in 100 mls @ 3.674 mls/hr  05/16/17 01:31  05/19/17 13:30





  Diprivan  IV   30 mcg/kg/min





  .Q24H PRN   22.045 mls/hr





  TITRATE PER MD ORDER   Administration





  Protocol   





  5 MCG/KG/MIN   


 


Meropenem 500 mg/ Sodium  100 mls @ 100 mls/hr  05/16/17 06:22  05/19/17 09:01





  Chloride  IVPB  05/23/17 06:23  100 mls/hr





  Q12 OSEAS   Administration


 


Insulin Human Regular 100  100 mls @ 5 mls/hr  05/17/17 08:42  05/17/17 23:32





  units/ Sodium Chloride  IV   0 units/hr





  .Q20H PRN   0 mls/hr





  TITRATE PER MD ORDER   Titration





  Protocol   





  5 UNITS/HR   


 


Metronidazole  500 mg in 100 mls @ 100 mls/hr  05/19/17 14:00  





  Flagyl  IVPB   





  Q8 OSEAS   





  Protocol   


 


Acetylcysteine 12,200 mg/  1,061 mls @ 66.313 mls/hr  05/19/17 15:06  





  Dextrose  IV  05/20/17 06:59  





  .Q16H ONE   


 


Insulin Detemir  20 unit  05/18/17 12:10  05/19/17 09:05





  Levemir  SC   20 unit





  DAILY OSEAS   Administration


 


Insulin Human Regular  0 units  05/18/17 10:35  05/19/17 14:44





  Humulin R Med  SC   5 units





  Q4H OSEAS   Administration


 


Lactulose  20 gm  05/16/17 10:30  05/19/17 09:29





  Enulose  PO   20 gm





  Q8H OSEAS   Administration


 


Ondansetron HCl  4 mg  05/15/17 22:41  





  Zofran Inj  IVP   





  Q6H PRN   





  Nausea/Vomiting   


 


Pantoprazole Sodium  40 mg  05/16/17 10:00  05/19/17 09:00





  Protonix Inj  IVP   40 mg





  DAILY OSEAS   Administration


 


Rifaximin  550 mg  05/16/17 10:30  05/19/17 09:00





  Xifaxan  PO   550 mg





  BID OSEAS   Administration





  Protocol   


 


Vancomycin HCl  500 mg  05/19/17 14:00  05/19/17 14:44





  Vancocin 25 Mg/Ml (Oral Use)  PO   500 mg





  QID OSEAS   Administration





  Protocol   














- Patient Studies


Lab Studies: 


 Microbiology Studies











 05/19/17 11:30 C. difficile Antigen & Toxin A,B (M - Final





 Stool 


 


 05/16/17 11:00 Gram Stain - Final





 Sputum Sputum Culture - Final





    Methicillin Resistant S Aureus


 


 05/15/17 23:50 Blood Culture - Preliminary





 Blood-Venous    NO GROWTH AFTER 3 DAYS








 Lab Studies











  05/19/17 05/19/17 05/19/17 Range/Units





  17:20 10:45 10:00 


 


WBC   67.4 H*   (4.5-11.0)  10^3/ul


 


RBC   2.48 L   (3.5-6.1)  10^6/uL


 


Hgb   7.5 L   (14.0-18.0)  gm/dL


 


Hct   23.1 L   (42.0-52.0)  %


 


MCV   93.1   (80.0-105.0)  fL


 


MCH   30.2   (25.0-35.0)  pg


 


MCHC   32.5   (31.0-37.0)  g/dl


 


RDW   19.4 H   (11.5-14.5)  %


 


Plt Count   144   (120.0-450.0)  10^3/uL


 


MPV   9.5   (7.0-11.0)  fl


 


PT  13.6 H    (9.9-11.8)  Seconds


 


INR  1.26 H    (0.93-1.08)  


 


APTT     (23.7-30.8)  Seconds


 


pCO2    61 H  (35-45)  mm/Hg


 


pO2    208.0 H  ()  mm/Hg


 


HCO3    25.0  (21-28)  mmol/L


 


ABG pH    7.22 L  (7.35-7.45)  


 


ABG Total CO2    26.9  (22-28)  mmol.L


 


ABG O2 Saturation    102.9 H  (95-98)  %


 


ABG Base Excess    -2.7 L  (-2.0-3.0)  mmol/L


 


ABG Potassium     (3.6-5.2)  mmol/L


 


Sodium     (132-148)  mmol/L


 


Chloride    97.0 L  ()  mmol/L


 


Glucose     ()  mg/dl


 


Lactate     (0.7-2.1)  mmol/L


 


FiO2     %


 


Potassium     (3.6-5.0)  mmol/L


 


Carbon Dioxide     (21-33)  mmol/L


 


Anion Gap     (10-20)  


 


BUN     (7-21)  mg/dL


 


Creatinine     (0.5-1.4)  mg/dL


 


Est GFR (African Amer)     


 


Est GFR (Non-Af Amer)     


 


POC Glucose (mg/dL)     ()  mg/dL


 


Random Glucose     ()  mg/dL


 


Calcium     (8.4-10.5)  mg/dL


 


Phosphorus     (2.5-4.5)  mg/dL


 


Magnesium     (1.7-2.2)  mg/dL


 


Total Bilirubin     (0.2-1.3)  mg/dL


 


AST     (15-59)  U/L


 


ALT     (7-56)  U/L


 


Alkaline Phosphatase     ()  U/L


 


Total Protein     (5.8-8.3)  g/dL


 


Albumin     (3.0-4.8)  g/dL


 


Globulin     gm/dL


 


Albumin/Globulin Ratio     (1.1-1.8)  


 


Ethanolamine     


 


Arterial Blood Potassium     (3.6-5.2)  mmol/L


 


Methyl Alcohol Level     


 


Isopropanol     


 


Acetone Level     mg/dL


 


Mycoplasma pneumon IgM     (<770)  U/mL














  05/19/17 05/19/17 05/19/17 Range/Units





  09:59 07:15 07:15 


 


WBC    67.5 H* D  (4.5-11.0)  10^3/ul


 


RBC    2.60 L  (3.5-6.1)  10^6/uL


 


Hgb    8.0 L  (14.0-18.0)  gm/dL


 


Hct    23.8 L  (42.0-52.0)  %


 


MCV    91.5  (80.0-105.0)  fL


 


MCH    30.8  (25.0-35.0)  pg


 


MCHC    33.6  (31.0-37.0)  g/dl


 


RDW    19.4 H  (11.5-14.5)  %


 


Plt Count    143  (120.0-450.0)  10^3/uL


 


MPV    10.2  (7.0-11.0)  fl


 


PT     (9.9-11.8)  Seconds


 


INR     (0.93-1.08)  


 


APTT     (23.7-30.8)  Seconds


 


pCO2     (35-45)  mm/Hg


 


pO2     ()  mm/Hg


 


HCO3     (21-28)  mmol/L


 


ABG pH     (7.35-7.45)  


 


ABG Total CO2     (22-28)  mmol.L


 


ABG O2 Saturation     (95-98)  %


 


ABG Base Excess     (-2.0-3.0)  mmol/L


 


ABG Potassium     (3.6-5.2)  mmol/L


 


Sodium   131 L   (132-148)  mmol/L


 


Chloride   91 L   ()  mmol/L


 


Glucose     ()  mg/dl


 


Lactate     (0.7-2.1)  mmol/L


 


FiO2     %


 


Potassium   4.4   (3.6-5.0)  mmol/L


 


Carbon Dioxide   22   (21-33)  mmol/L


 


Anion Gap   22 H   (10-20)  


 


BUN   57 H   (7-21)  mg/dL


 


Creatinine   5.9 H   (0.5-1.4)  mg/dL


 


Est GFR ( Amer)   12   


 


Est GFR (Non-Af Amer)   10   


 


POC Glucose (mg/dL)  245 H    ()  mg/dL


 


Random Glucose   235 H   ()  mg/dL


 


Calcium   10.3   (8.4-10.5)  mg/dL


 


Phosphorus     (2.5-4.5)  mg/dL


 


Magnesium     (1.7-2.2)  mg/dL


 


Total Bilirubin   5.5 H   (0.2-1.3)  mg/dL


 


AST   1193 H   (15-59)  U/L


 


ALT   1328 H   (7-56)  U/L


 


Alkaline Phosphatase   171 H   ()  U/L


 


Total Protein   9.0 H   (5.8-8.3)  g/dL


 


Albumin   3.7   (3.0-4.8)  g/dL


 


Globulin   5.2   gm/dL


 


Albumin/Globulin Ratio   0.7 L   (1.1-1.8)  


 


Ethanolamine     


 


Arterial Blood Potassium     (3.6-5.2)  mmol/L


 


Methyl Alcohol Level     


 


Isopropanol     


 


Acetone Level     mg/dL


 


Mycoplasma pneumon IgM     (<770)  U/mL














  05/19/17 05/18/17 05/18/17 Range/Units





  06:09 19:05 07:00 


 


WBC   37.9 H* D   (4.5-11.0)  10^3/ul


 


RBC   2.76 L   (3.5-6.1)  10^6/uL


 


Hgb   8.4 L   (14.0-18.0)  gm/dL


 


Hct   25.8 L   (42.0-52.0)  %


 


MCV   93.5   (80.0-105.0)  fL


 


MCH   30.4   (25.0-35.0)  pg


 


MCHC   32.6   (31.0-37.0)  g/dl


 


RDW   19.6 H   (11.5-14.5)  %


 


Plt Count   128   (120.0-450.0)  10^3/uL


 


MPV   9.7   (7.0-11.0)  fl


 


PT    14.2 H  (9.9-11.8)  Seconds


 


INR    1.31 H  (0.93-1.08)  


 


APTT    26.8  (23.7-30.8)  Seconds


 


pCO2  47 H    (35-45)  mm/Hg


 


pO2  115.0 H    ()  mm/Hg


 


HCO3  23.7    (21-28)  mmol/L


 


ABG pH  7.31 L    (7.35-7.45)  


 


ABG Total CO2  25.1    (22-28)  mmol.L


 


ABG O2 Saturation  100.9 H    (95-98)  %


 


ABG Base Excess  -2.4 L    (-2.0-3.0)  mmol/L


 


ABG Potassium  4.4    (3.6-5.2)  mmol/L


 


Sodium  132.0    (132-148)  mmol/L


 


Chloride  100.0    ()  mmol/L


 


Glucose  223 H    ()  mg/dl


 


Lactate  1.0    (0.7-2.1)  mmol/L


 


FiO2  40.0    %


 


Potassium     (3.6-5.0)  mmol/L


 


Carbon Dioxide     (21-33)  mmol/L


 


Anion Gap     (10-20)  


 


BUN     (7-21)  mg/dL


 


Creatinine     (0.5-1.4)  mg/dL


 


Est GFR (African Amer)     


 


Est GFR (Non-Af Amer)     


 


POC Glucose (mg/dL)     ()  mg/dL


 


Random Glucose     ()  mg/dL


 


Calcium     (8.4-10.5)  mg/dL


 


Phosphorus     (2.5-4.5)  mg/dL


 


Magnesium     (1.7-2.2)  mg/dL


 


Total Bilirubin     (0.2-1.3)  mg/dL


 


AST     (15-59)  U/L


 


ALT     (7-56)  U/L


 


Alkaline Phosphatase     ()  U/L


 


Total Protein     (5.8-8.3)  g/dL


 


Albumin     (3.0-4.8)  g/dL


 


Globulin     gm/dL


 


Albumin/Globulin Ratio     (1.1-1.8)  


 


Ethanolamine     


 


Arterial Blood Potassium  4.4    (3.6-5.2)  mmol/L


 


Methyl Alcohol Level     


 


Isopropanol     


 


Acetone Level     mg/dL


 


Mycoplasma pneumon IgM     (<770)  U/mL














  05/18/17 05/18/17 05/16/17 Range/Units





  07:00 05:50 09:00 


 


WBC     (4.5-11.0)  10^3/ul


 


RBC     (3.5-6.1)  10^6/uL


 


Hgb     (14.0-18.0)  gm/dL


 


Hct     (42.0-52.0)  %


 


MCV     (80.0-105.0)  fL


 


MCH     (25.0-35.0)  pg


 


MCHC     (31.0-37.0)  g/dl


 


RDW     (11.5-14.5)  %


 


Plt Count     (120.0-450.0)  10^3/uL


 


MPV     (7.0-11.0)  fl


 


PT     (9.9-11.8)  Seconds


 


INR     (0.93-1.08)  


 


APTT     (23.7-30.8)  Seconds


 


pCO2   53 H   (35-45)  mm/Hg


 


pO2   103.0 H   ()  mm/Hg


 


HCO3   27.9   (21-28)  mmol/L


 


ABG pH   7.33 L   (7.35-7.45)  


 


ABG Total CO2   29.5 H   (22-28)  mmol.L


 


ABG O2 Saturation   100.0 H   (95-98)  %


 


ABG Base Excess   1.6   (-2.0-3.0)  mmol/L


 


ABG Potassium   4.5   (3.6-5.2)  mmol/L


 


Sodium  134  135.0   (132-148)  mmol/L


 


Chloride  94 L  102.0   ()  mmol/L


 


Glucose   178 H   ()  mg/dl


 


Lactate   1.1   (0.7-2.1)  mmol/L


 


FiO2   40   %


 


Potassium  4.7    (3.6-5.0)  mmol/L


 


Carbon Dioxide  24    (21-33)  mmol/L


 


Anion Gap  21 H    (10-20)  


 


BUN  36 H    (7-21)  mg/dL


 


Creatinine  4.6 H    (0.5-1.4)  mg/dL


 


Est GFR ( Amer)  16    


 


Est GFR (Non-Af Amer)  13    


 


POC Glucose (mg/dL)     ()  mg/dL


 


Random Glucose  137 H    ()  mg/dL


 


Calcium  9.9    (8.4-10.5)  mg/dL


 


Phosphorus  4.1    (2.5-4.5)  mg/dL


 


Magnesium  2.0    (1.7-2.2)  mg/dL


 


Total Bilirubin  6.2 H    (0.2-1.3)  mg/dL


 


AST  1573 H    (15-59)  U/L


 


ALT  1892 H    (7-56)  U/L


 


Alkaline Phosphatase  161 H    ()  U/L


 


Total Protein  8.9 H    (5.8-8.3)  g/dL


 


Albumin  3.5    (3.0-4.8)  g/dL


 


Globulin  5.3    gm/dL


 


Albumin/Globulin Ratio  0.7 L    (1.1-1.8)  


 


Ethanolamine    None detected  


 


Arterial Blood Potassium   4.5   (3.6-5.2)  mmol/L


 


Methyl Alcohol Level    None detected  


 


Isopropanol    None detected  


 


Acetone Level    6 H  mg/dL


 


Mycoplasma pneumon IgM     (<770)  U/mL














  05/16/17 Range/Units





  06:30 


 


WBC   (4.5-11.0)  10^3/ul


 


RBC   (3.5-6.1)  10^6/uL


 


Hgb   (14.0-18.0)  gm/dL


 


Hct   (42.0-52.0)  %


 


MCV   (80.0-105.0)  fL


 


MCH   (25.0-35.0)  pg


 


MCHC   (31.0-37.0)  g/dl


 


RDW   (11.5-14.5)  %


 


Plt Count   (120.0-450.0)  10^3/uL


 


MPV   (7.0-11.0)  fl


 


PT   (9.9-11.8)  Seconds


 


INR   (0.93-1.08)  


 


APTT   (23.7-30.8)  Seconds


 


pCO2   (35-45)  mm/Hg


 


pO2   ()  mm/Hg


 


HCO3   (21-28)  mmol/L


 


ABG pH   (7.35-7.45)  


 


ABG Total CO2   (22-28)  mmol.L


 


ABG O2 Saturation   (95-98)  %


 


ABG Base Excess   (-2.0-3.0)  mmol/L


 


ABG Potassium   (3.6-5.2)  mmol/L


 


Sodium   (132-148)  mmol/L


 


Chloride   ()  mmol/L


 


Glucose   ()  mg/dl


 


Lactate   (0.7-2.1)  mmol/L


 


FiO2   %


 


Potassium   (3.6-5.0)  mmol/L


 


Carbon Dioxide   (21-33)  mmol/L


 


Anion Gap   (10-20)  


 


BUN   (7-21)  mg/dL


 


Creatinine   (0.5-1.4)  mg/dL


 


Est GFR (African Amer)   


 


Est GFR (Non-Af Amer)   


 


POC Glucose (mg/dL)   ()  mg/dL


 


Random Glucose   ()  mg/dL


 


Calcium   (8.4-10.5)  mg/dL


 


Phosphorus   (2.5-4.5)  mg/dL


 


Magnesium   (1.7-2.2)  mg/dL


 


Total Bilirubin   (0.2-1.3)  mg/dL


 


AST   (15-59)  U/L


 


ALT   (7-56)  U/L


 


Alkaline Phosphatase   ()  U/L


 


Total Protein   (5.8-8.3)  g/dL


 


Albumin   (3.0-4.8)  g/dL


 


Globulin   gm/dL


 


Albumin/Globulin Ratio   (1.1-1.8)  


 


Ethanolamine   


 


Arterial Blood Potassium   (3.6-5.2)  mmol/L


 


Methyl Alcohol Level   


 


Isopropanol   


 


Acetone Level   mg/dL


 


Mycoplasma pneumon IgM  95  (<770)  U/mL








 Laboratory Results - last 24 hr











  05/16/17 05/16/17 05/18/17





  06:30 09:00 05:50


 


WBC   


 


RBC   


 


Hgb   


 


Hct   


 


MCV   


 


MCH   


 


MCHC   


 


RDW   


 


Plt Count   


 


MPV   


 


PT   


 


INR   


 


APTT   


 


pCO2    53 H


 


pO2    103.0 H


 


HCO3    27.9


 


ABG pH    7.33 L


 


ABG Total CO2    29.5 H


 


ABG O2 Saturation    100.0 H


 


ABG Base Excess    1.6


 


ABG Potassium    4.5


 


Sodium    135.0


 


Chloride    102.0


 


Glucose    178 H


 


Lactate    1.1


 


FiO2    40


 


Potassium   


 


Carbon Dioxide   


 


Anion Gap   


 


BUN   


 


Creatinine   


 


Est GFR ( Amer)   


 


Est GFR (Non-Af Amer)   


 


POC Glucose (mg/dL)   


 


Random Glucose   


 


Calcium   


 


Phosphorus   


 


Magnesium   


 


Total Bilirubin   


 


AST   


 


ALT   


 


Alkaline Phosphatase   


 


Total Protein   


 


Albumin   


 


Globulin   


 


Albumin/Globulin Ratio   


 


Ethanolamine   None detected 


 


Arterial Blood Potassium    4.5


 


Methyl Alcohol Level   None detected 


 


Isopropanol   None detected 


 


Acetone Level   6 H 


 


Mycoplasma pneumon IgM  95  














  05/18/17 05/18/17 05/18/17





  07:00 07:00 19:05


 


WBC    37.9 H* D


 


RBC    2.76 L


 


Hgb    8.4 L


 


Hct    25.8 L


 


MCV    93.5


 


MCH    30.4


 


MCHC    32.6


 


RDW    19.6 H


 


Plt Count    128


 


MPV    9.7


 


PT   14.2 H 


 


INR   1.31 H 


 


APTT   26.8 


 


pCO2   


 


pO2   


 


HCO3   


 


ABG pH   


 


ABG Total CO2   


 


ABG O2 Saturation   


 


ABG Base Excess   


 


ABG Potassium   


 


Sodium  134  


 


Chloride  94 L  


 


Glucose   


 


Lactate   


 


FiO2   


 


Potassium  4.7  


 


Carbon Dioxide  24  


 


Anion Gap  21 H  


 


BUN  36 H  


 


Creatinine  4.6 H  


 


Est GFR ( Amer)  16  


 


Est GFR (Non-Af Amer)  13  


 


POC Glucose (mg/dL)   


 


Random Glucose  137 H  


 


Calcium  9.9  


 


Phosphorus  4.1  


 


Magnesium  2.0  


 


Total Bilirubin  6.2 H  


 


AST  1573 H  


 


ALT  1892 H  


 


Alkaline Phosphatase  161 H  


 


Total Protein  8.9 H  


 


Albumin  3.5  


 


Globulin  5.3  


 


Albumin/Globulin Ratio  0.7 L  


 


Ethanolamine   


 


Arterial Blood Potassium   


 


Methyl Alcohol Level   


 


Isopropanol   


 


Acetone Level   


 


Mycoplasma pneumon IgM   














  05/19/17 05/19/17 05/19/17





  06:09 07:15 07:15


 


WBC   67.5 H* D 


 


RBC   2.60 L 


 


Hgb   8.0 L 


 


Hct   23.8 L 


 


MCV   91.5 


 


MCH   30.8 


 


MCHC   33.6 


 


RDW   19.4 H 


 


Plt Count   143 


 


MPV   10.2 


 


PT   


 


INR   


 


APTT   


 


pCO2  47 H  


 


pO2  115.0 H  


 


HCO3  23.7  


 


ABG pH  7.31 L  


 


ABG Total CO2  25.1  


 


ABG O2 Saturation  100.9 H  


 


ABG Base Excess  -2.4 L  


 


ABG Potassium  4.4  


 


Sodium  132.0   131 L


 


Chloride  100.0   91 L


 


Glucose  223 H  


 


Lactate  1.0  


 


FiO2  40.0  


 


Potassium    4.4


 


Carbon Dioxide    22


 


Anion Gap    22 H


 


BUN    57 H


 


Creatinine    5.9 H


 


Est GFR ( Amer)    12


 


Est GFR (Non-Af Amer)    10


 


POC Glucose (mg/dL)   


 


Random Glucose    235 H


 


Calcium    10.3


 


Phosphorus   


 


Magnesium   


 


Total Bilirubin    5.5 H


 


AST    1193 H


 


ALT    1328 H


 


Alkaline Phosphatase    171 H


 


Total Protein    9.0 H


 


Albumin    3.7


 


Globulin    5.2


 


Albumin/Globulin Ratio    0.7 L


 


Ethanolamine   


 


Arterial Blood Potassium  4.4  


 


Methyl Alcohol Level   


 


Isopropanol   


 


Acetone Level   


 


Mycoplasma pneumon IgM   














  05/19/17 05/19/17 05/19/17





  09:59 10:00 10:45


 


WBC    67.4 H*


 


RBC    2.48 L


 


Hgb    7.5 L


 


Hct    23.1 L


 


MCV    93.1


 


MCH    30.2


 


MCHC    32.5


 


RDW    19.4 H


 


Plt Count    144


 


MPV    9.5


 


PT   


 


INR   


 


APTT   


 


pCO2   61 H 


 


pO2   208.0 H 


 


HCO3   25.0 


 


ABG pH   7.22 L 


 


ABG Total CO2   26.9 


 


ABG O2 Saturation   102.9 H 


 


ABG Base Excess   -2.7 L 


 


ABG Potassium   


 


Sodium   


 


Chloride   97.0 L 


 


Glucose   


 


Lactate   


 


FiO2   


 


Potassium   


 


Carbon Dioxide   


 


Anion Gap   


 


BUN   


 


Creatinine   


 


Est GFR ( Amer)   


 


Est GFR (Non-Af Amer)   


 


POC Glucose (mg/dL)  245 H  


 


Random Glucose   


 


Calcium   


 


Phosphorus   


 


Magnesium   


 


Total Bilirubin   


 


AST   


 


ALT   


 


Alkaline Phosphatase   


 


Total Protein   


 


Albumin   


 


Globulin   


 


Albumin/Globulin Ratio   


 


Ethanolamine   


 


Arterial Blood Potassium   


 


Methyl Alcohol Level   


 


Isopropanol   


 


Acetone Level   


 


Mycoplasma pneumon IgM   














  05/19/17





  17:20


 


WBC 


 


RBC 


 


Hgb 


 


Hct 


 


MCV 


 


MCH 


 


MCHC 


 


RDW 


 


Plt Count 


 


MPV 


 


PT  13.6 H


 


INR  1.26 H


 


APTT 


 


pCO2 


 


pO2 


 


HCO3 


 


ABG pH 


 


ABG Total CO2 


 


ABG O2 Saturation 


 


ABG Base Excess 


 


ABG Potassium 


 


Sodium 


 


Chloride 


 


Glucose 


 


Lactate 


 


FiO2 


 


Potassium 


 


Carbon Dioxide 


 


Anion Gap 


 


BUN 


 


Creatinine 


 


Est GFR ( Amer) 


 


Est GFR (Non-Af Amer) 


 


POC Glucose (mg/dL) 


 


Random Glucose 


 


Calcium 


 


Phosphorus 


 


Magnesium 


 


Total Bilirubin 


 


AST 


 


ALT 


 


Alkaline Phosphatase 


 


Total Protein 


 


Albumin 


 


Globulin 


 


Albumin/Globulin Ratio 


 


Ethanolamine 


 


Arterial Blood Potassium 


 


Methyl Alcohol Level 


 


Isopropanol 


 


Acetone Level 


 


Mycoplasma pneumon IgM 











Fingerstick Blood Sugar Results: 276





Review of Systems





- Review of Systems


Systems not reviewed;Unavailable: Intubated





Critical Care Progress Note





- Ventilator Checklist


Head of Bed 30 Degrees: Yes


Daily Sedation Vacation: Yes


Daily Assessment of Readiness to Wean: Yes


Daily Spontaneous Breathing Trial: Yes


PUD Prophalyxis: Yes


DVT Prophylaxis: Yes


Oral Care with Chlorhexidine Gluconate {CHG}: Yes





- Nutrition


Nutrition: 


 Nutrition











 Category Date Time Status


 


 NPO Diet [DIET] Diets  05/17/17 Breakfast Ordered














Assessment/Plan





- Assessment and Plan (Free Text)


Assessment: 





60 y/o M w/ MODS





Acute respiratory failure due to b/l multolobar infiltrated found to have MRSA 

in the sputum 


On AC/VC to keep ro2 sat > 90% pao2 > 65. O2 sat on monitor is often 

inncorrect. 


Sputum cx sent again with multiple orders for rare infections in the possible 

state of immunocompromised state. 


Markedely elevated WBC 67K unclear reason, C.Diff sent and negative, No hx of 

leukemia or lymphoma, no signs of Myelofibrosis , septic joint , gout etc. 


May need to repeat CT CHEST?ABD/Pelvis. 


If concern for mental status worsens and / or fevers start, would plan for LP 

to r/o encephalitis. This morning off sedation patient was able to follow 

commands. 


ESRD, HD to be done today for fluid removal as tolerated. 


Acute liver failure 


Completed NAC protocol, lft's improving, T.Bili elevated but improved and INR 

normal. 


Need further history from family or GF about his course of sickness to better 

understand . 


HIV pending, multiple + screens prior w/ low CD4. 





multiple consultants on board. 


DVT P heparin sq tid


PPI





cc time 75 min

## 2017-05-19 NOTE — CP.PCM.PN
Subjective





- Date & Time of Evaluation


Date of Evaluation: 05/19/17


Time of Evaluation: 09:00





- Subjective


Subjective: 





Patient continues to be on the ventilator and sedated. No fevers overnight.





Objective





- Vital Signs/Intake and Output


Vital Signs (last 24 hours): 


 











Temp Pulse Resp BP Pulse Ox


 


 97.6 F   81   28 H  138/54 L  86 L


 


 05/19/17 16:00  05/19/17 18:46  05/19/17 12:00  05/19/17 18:46  05/19/17 18:46








Intake and Output: 


 











 05/19/17 05/20/17





 18:59 06:59


 


Intake Total 1950 


 


Output Total 2500 


 


Balance -550 














- Medications


Medications: 


 Current Medications





Albuterol/Ipratropium (Duoneb 3 Mg/0.5 Mg (3 Ml) Ud)  3 ml IH Q2H PRN


   PRN Reason: Shortness of Breath


Albuterol/Ipratropium (Duoneb 3 Mg/0.5 Mg (3 Ml) Ud)  3 ml IH [id number] [facility]


   Last Admin: 05/19/17 13:44 Dose:  3 ml


Heparin Sodium (Porcine) (Heparin)  5,000 units SC Q12 [facility]


   PRN Reason: Protocol


   Last Admin: 05/19/17 09:07 Dose:  5,000 units


Hydralazine HCl (Apresoline)  10 mg IVP Q6 PRN


   PRN Reason: SBP >180


   Last Admin: 05/17/17 02:05 Dose:  10 mg


Propofol (Diprivan)  1,000 mg in 100 mls @ 3.674 mls/hr IV .Q24H PRN; Protocol; 

5 MCG/KG/MIN


   PRN Reason: TITRATE PER MD ORDER


   Last Admin: 05/19/17 13:30 Dose:  30 mcg/kg/min, 22.045 mls/hr


Meropenem 500 mg/ Sodium (Chloride)  100 mls @ 100 mls/hr IVPB Q12 [facility]


   Stop: 05/23/17 06:23


   Last Admin: 05/19/17 09:01 Dose:  100 mls/hr


Insulin Human Regular 100 (units/ Sodium Chloride)  100 mls @ 5 mls/hr IV .Q20H 

PRN; Protocol; 5 UNITS/HR


   PRN Reason: TITRATE PER MD ORDER


   Last Titration: 05/17/17 23:32 Dose:  0 units/hr, 0 mls/hr


Metronidazole (Flagyl)  500 mg in 100 mls @ 100 mls/hr IVPB Q8 [facility]


   PRN Reason: Protocol


   Last Admin: 05/19/17 14:45 Dose:  100 mls/hr


Acetylcysteine 12,200 mg/ (Dextrose)  1,061 mls @ 66.313 mls/hr IV .Q16H [facility]


   Stop: 05/20/17 06:59


   Last Admin: 05/19/17 15:30 Dose:  66.313 mls/hr


Linezolid (Zyvox 600mg/300ml D5w)  600 mg in 300 mls @ 200 mls/hr IVPB Q12 [facility]


   PRN Reason: Protocol


   Stop: 05/26/17 22:01


Insulin Detemir (Levemir)  20 unit SC DAILY [facility]


   Last Admin: 05/19/17 09:05 Dose:  20 unit


Insulin Human Regular (Humulin R Med)  0 units SC Q4H [facility]


   Last Admin: 05/19/17 17:51 Dose:  3 units


Lactulose (Enulose)  20 gm PO Q8H [facility]


   Last Admin: 05/19/17 17:50 Dose:  20 gm


Ondansetron HCl (Zofran Inj)  4 mg IVP Q6H PRN


   PRN Reason: Nausea/Vomiting


Pantoprazole Sodium (Protonix Inj)  40 mg IVP DAILY [facility]


   Last Admin: 05/19/17 09:00 Dose:  40 mg


Rifaximin (Xifaxan)  550 mg PO BID [facility]


   PRN Reason: Protocol


   Last Admin: 05/19/17 17:50 Dose:  550 mg


Vancomycin HCl (Vancocin 25 Mg/Ml (Oral Use))  500 mg PO QID [facility]


   PRN Reason: Protocol


   Last Admin: 05/19/17 17:52 Dose:  500 mg











- Labs


Labs: 


 





 05/19/17 10:45 





 05/19/17 07:15 





 











PT  13.6 Seconds (9.9-11.8)  H  05/19/17  17:20    


 


INR  1.26  (0.93-1.08)  H  05/19/17  17:20    


 


APTT  26.8 Seconds (23.7-30.8)   05/18/17  07:00    














- Constitutional


Appears: Other (Intubated and sedated)





- Head Exam


Head Exam: NORMAL INSPECTION





- ENT Exam


Additional comments: 





ET tube in place





- Neck Exam


Neck Exam: absent: Lymphadenopathy, Meningismus





- Respiratory Exam


Respiratory Exam: Decreased Breath Sounds





- Cardiovascular Exam


Cardiovascular Exam: +S1, +S2





- GI/Abdominal Exam


GI & Abdominal Exam: Soft.  absent: Tenderness





Assessment and Plan





- Assessment and Plan (Free Text)


Plan: 





Assessment


Severe sepsis with ventilator-dependent respiratory failure and acute liver 

failure/acute fulminant hepatitis due to bilateral healthcare-associated 

pneumonia with possible gram positive cocci and/or gram negative bacilli; 

sputum cx now growing MRSA


HTN


ESRD on HD


dyslipidemia


morbid obesity with BMI 45 


positive HIV JEANNETTE test with negative HIV-1 PCR





Plan


will change IV Vancomycin to Zyvox and continue Merrem (day 4); we have 

discontinued Doxycycline several days ago since it may worsen the liver failure

; reviewed CT scan which showed the bilateral consolidations and infiltrates in 

the lungs; reviewed ultrasound of the abdomen which showed the fatty liver and 

hepatomegaly


Patient is in critical condition and his overall prognosis is getting worse; 

awaiting transfer to a tertiary care center - discussed with Dr. Chandra and 

Dr. Tian

## 2017-05-19 NOTE — PN
DATE: 05/19/2017



SUBJECTIVE:  The patient is intubated.



PHYSICAL EXAMINATION:    

VITAL SIGNS:  Temperature is 98.9, pulse of 85, blood pressure 155/65, respirations are 28. 

GENERAL:   The patient comfortable, in no acute distress.

HEENT:   Anicteric sclerae.  Moist mucosa.

NECK:   No JVD or adenopathy.

CARDIAC:   S1/S2.  No murmurs.  No rubs.  Regular.

RESPIRATORY:   Clear to auscultation bilaterally.  No wheezes, rales, or rhonchi.  Good air entry.

ABDOMEN:   Bowel sounds are positive, soft, nontender, and nondistended.

EXTREMITIES:   No edema.  Has 1+ pulses.



LABS:    White count of 67.4, hemoglobin 7.5, creatinine is 5.9.

 

ASSESSMENT:    

1.  Respiratory failure, on ventilator.

2.  Sepsis.

3.  Acute liver failure.

4.  End-stage renal disease.

5.  Hypertension.

6.  Acute anemia.

 

PLAN:  The patient is currently admitted to the hospital in the ICU.  He is critically ill.  He is on
 nebulizer treatments.  He is on propofol.  The patient is on lactulose.  He is on Flagyl.  The patie
nt is receiving insulin drip for his diabetes.  He is on Levemir.  The patient is on Protonix.  He is
 on vancomycin.  He has blood cultures that are negative.  He has sputum culture that shows MRSA.  Th
e patient's liver functions are improving.





__________________________________________

Tucker Pollard MD







cc:



DD: 05/19/2017 18:42:24  [id number]

TT: 05/19/2017 21:10:28

Confirmation # [id number]

Dictation # [id number]

rn

## 2017-05-19 NOTE — PN
DATE: 05/19/2017



REASON FOR CONSULTATION AND FOLLOWUP:  Status post respiratory failure, intubated, acute liver failur
e, borderline troponin secondary to end-stage renal disease.



BRIEF CLINICAL HISTORY:  This is a 61-year-old morbidly obese male with past medical history of diabe
aguilar, hypertension, hyperlipidemia; end-stage renal disease, on dialysis; cardiac catheterization 01/1
2/2017, nonobstructive coronary artery disease.  Admitted with a history of shortness of breath now f
or 2-3 days.  Prior to that was on Zithromax at home by PMD.  Why the patient came here, respiratory 
status deteriorated requiring intubation.  Found to be acute liver failure with ____ AST in 7000s.  A
waiting to be transferred to [facility], waiting for the bed.  Now, the trend of AST is going down.  Liver f
unctions are improving.  He still remains intubated.  On yesterday, had dialysis.  The patient made s
ome troponin, 0.35, 0.75 and 0.65, which I do not think so is significant; less than 0.7 with end-sta
ge renal disease probably is not significant.



PHYSICAL EXAMINATION:

VITAL SIGNS:  Temperature afebrile, heart rate 84, blood pressure 130/80.

HEENT:  PERRLA.  Extraocular muscles intact.

NECK:  Supple.  No carotid bruits.  No thyromegaly.

CHEST:  Clear to auscultation.

HEART:  S1, S2 regular.

ABDOMEN:  Soft.

EXTREMITIES:  Clubbing and cyanosis negative.



BLOOD WORKUP:  As follows:  WBC 67.5, hemoglobin 8, hematocrit 23.7, platelet count 143.  INR 1.31 as
 of yesterday.  Chemistry shows ____ 131, potassium 4.4, chloride ____, carbon dioxide 22, anion gap 
of ____, BUN 57, creatinine 5.9.  AST 1193, ALT 1323.



IMPRESSION:  Acute liver failure, now improving, etiology is not clear, probably shock liver question
ed above; hypertension; end-stage renal disease, on dialysis; I doubt it is myocardial infarction.  I
 think there is no evidence of acute coronary syndrome, status post cardiac catheterization on 01/22/
2017, mild coronary artery disease.  Maximum troponin the patient made was 0.75 which is unlikely to 
be cardiac in origin and probably secondary to hemodynamic instability.



RECOMMENDATION:  Continue vent management.  Try to wean off the event.  Monitor closely liver functio
n.  Avoid nephrotoxic and hepatotoxic medication.  Continue p.r.n. hydralazine as needed for blood pr
essure.  We will follow with you.  Overall, patient's condition is critical.  Prognosis is guarded.  
Now patient's condition is improving.  Will follow with you.  Elevated WBC could be secondary to seps
is.  



The patient had a repeat echocardiography done to assess LV function yesterday that showed mildly imp
aired ejection ____ 45-50%, mild regional wall motion abnormality noted, right ventricle moderately d
ilated, RV systolic function moderately reduced, trace to mild aortic regurgitation, mild mitral regu
rgitation, mild to moderate tricuspid regurgitation, RV systolic pressure 45.



Thank you, Dr. Rae, for providing the opportunity in taking care of this patient.





__________________________________________

Sunil Thompson MD







cc:



DD: 05/19/2017 09:45:18  [id number]

TT: 05/19/2017 10:33:33

Confirmation # [id number]

Dictation # [id number]

mn



05/19/2017 09:34:24

## 2017-05-19 NOTE — PN
DATE: 05/18/2017



SUBJECTIVE:  The patient is seen in the ICU.  He remains sedated, on mechanical ventilation.  He anthony
ins on Mucomyst/acetylcysteine.  He is also on rifaximin, empiric antibiotics of vancomycin and merop
enem.  He is also on an insulin drip.



PHYSICAL EXAMINATION:

GENERAL:  Morbidly obese, middle-aged male lying in bed in the ICU, on mechanical ventilation.

VITAL SIGNS:  Blood pressure 138/64, heart rate 84, respiratory rate 38, temperature 97.5.

HEENT:  Normocephalic, atraumatic.  Pupils sluggish, reactive to light.

NECK:  Supple, no JVD.

LUNGS:  Bilateral equal air entry, bilateral equal expansion.  No rales appreciated.

CARDIAC:  S1, S2, regular rate and rhythm, no murmur, no rub.

ABDOMEN:  Obese, distended, soft, nontender, bowel sounds present.

EXTREMITIES:  2+ pitting edema of the lower extremities, puffiness of the face, periorbital edema.

INTAKE AND OUTPUT:  2215/2900.



LABORATORY DATA:  WBC 24.8, hemoglobin 8, hematocrit 25, platelets 154.  Sodium 136, potassium 4.0, c
hloride 97, CO2 of 29, BUN 24, creatinine 3.4, glucose 134.  Total bili 6.1, AST 2468, ALT 2425, albu
min 3.4.  Hepatitis profile is negative.  Mycoplasma pneumonia IgM 95.  Blood cultures no growth so f
ar.



MEDICATIONS:  Mucomyst, Apresoline p.r.n., Diprivan, DuoNeb, Enulose, heparin, insulin, Levemir, greg
penem, Protonix, vancomycin, rifaximin, Zofran.



ASSESSMENT AND PLAN:  A 61-year-old male with history of morbid obesity, hypertension, end-stage dejon
l disease, anemia, multiple blood transfusions, admitted with weakness, fatigue, found to have altere
d mental status.  Found to have severe sepsis, acute hepatic failure, fulminant hepatitis.  Etiology 
of acute hepatitis is unclear at this time.  The patient also found to have elevated troponins, likel
y secondary to renal failure.

1.  Sepsis.

2.  Acute liver failure/fulminant hepatitis.

3.  Anasarca.

4.  End-stage renal disease.

5.  Possible pneumonia.

6.  Respiratory failure.

7.  Altered mental status.



PLAN:

1.  The patient has received dialysis and ultrafiltration.  Will ultrafiltrate again.  Will try to re
move 3 kilos today.

2.  Continue antibiotics to cover for pneumonia.

3.  Continue Mucomyst/acetylcysteine for acute liver failure.

4.  Continue PPI.

5.  Continue insulin drip to maintain normal glycemia.



The patient remains critically ill.  Case is discussed with ICU residents, ICU nursing staff, dialysi
s staff.



More than 35 minutes was spent in the care of this critically ill patient.





__________________________________________

Lavonne Maria MD







cc:



DD: 05/19/2017 09:25:02  [id number]

TT: 05/19/2017 09:50:26

Confirmation # [id number]

Dictation # [id number]

mn

## 2017-05-19 NOTE — RAD
HISTORY:

intubated, resp failure  



COMPARISON:

05/18/2017 



FINDINGS:



LUNGS:

No active pulmonary disease.



PLEURA:

No significant pleural effusion identified, no pneumothorax apparent.



CARDIOVASCULAR:

There is severe cardiomegaly and severe vascular congestion increased 

from prior exam



OSSEOUS STRUCTURES:

No significant abnormalities.



VISUALIZED UPPER ABDOMEN:

Normal.



OTHER FINDINGS:

Endotracheal tube and nasogastric tube in satisfactory position



IMPRESSION:

CHF

## 2017-05-20 LAB
ADD MANUAL DIFF?: YES
ALBUMIN/GLOB SERPL: 0.8 {RATIO}
ALP SERPL-CCNC: 147 U/L
ALT SERPL-CCNC: 811 U/L
ANISOCYTOSIS BLD QL SMEAR: (no result)
AST SERPL-CCNC: 865 U/L
BILIRUB SERPL-MCNC: 5.8 MG/DL
BUN SERPL-MCNC: 67 MG/DL
CALCIUM SERPL-MCNC: 10.9 MG/DL
CHLORIDE SERPL-SCNC: 90 MMOL/L
CO2 SERPL-SCNC: 21 MMOL/L
DACRYOCYTES BLD QL SMEAR: SLIGHT
EOSINOPHIL NFR BLD: 1 %
ERYTHROCYTE [DISTWIDTH] IN BLOOD BY AUTOMATED COUNT: 18.9 %
GLOBULIN SER-MCNC: 4.9 GM/DL
GLUCOSE SERPL-MCNC: 337 MG/DL
HCT VFR BLD CALC: 19.6 %
HYPOCHROMIA BLD QL SMEAR: (no result)
INR PPP: 1.17
LG PLATELETS BLD QL SMEAR: PRESENT
MCH RBC QN AUTO: 32.2 PG
MCHC RBC AUTO-ENTMCNC: 35.2 G/DL
MCV RBC AUTO: 91.6 FL
METAMYELOCYTES NFR BLD: 3 %
MYELOCYTES NFR BLD: 3 %
NEUTROPHILS NFR BLD AUTO: 63 %
NEUTS BAND NFR BLD: 7 %
NRBC BLD AUTO-RTO: 25 %
OVALOCYTES BLD QL SMEAR: SLIGHT
PLATELET # BLD EST: NORMAL 10*3/UL
PLATELET # BLD: 169 10^3/UL
PMV BLD AUTO: 10.1 FL
POIKILOCYTOSIS BLD QL SMEAR: SLIGHT
POTASSIUM SERPL-SCNC: 5.2 MMOL/L
PROT SERPL-MCNC: 8.7 G/DL
SODIUM SERPL-SCNC: 130 MMOL/L
VARIANT LYMPHS NFR BLD MANUAL: 2 %
WBC # BLD AUTO: 70.2 10^3/UL
WBC NRBC COR # BLD: 56.2 K/MM3

## 2017-05-20 NOTE — CT
PROCEDURE:  CT Chest, Abdomen and Pelvis without intravenous contrast



HISTORY:

Worsening sepsis



COMPARISON:

5/16/2017



TECHNIQUE:

Radiation dose:



Total exam DLP = 1363.47 mGy-cm.



This CT exam was performed using one or more of the following dose 

reduction techniques: Automated exposure control, adjustment of the 

mA and/or kV according to patient size, and/or use of iterative 

reconstruction technique.



FINDINGS:



CT CHEST WITHOUT CONTRAST:



LUNGS:

Diffuse bilateral upper lobe alveolar opacity decreased in extent 

compared to prior CT examination.  Bilateral lower lobe consolidation,

 decreased in right lower lobe compared to prior examination but 

unchanged in left lower lobe.  Consider atelectasis or pneumonia.  No 

endobronchial mass identified.



MEDIASTINUM:

Cardiomegaly. No pericardial effusion. No evidence of thoracic aortic 

aneurysm. Normal caliber main pulmonary artery. Endotracheal tube 

noted. Tip positioned approximately 5.2 cm proximal to the tracheal 

quincy.  Nasogastric tube traverses the thoracic esophagus to the 

upper abdomen. 



LYMPH NODES:

Unremarkable.



PLEURA:

Unremarkable. No pneumothorax. No pleural fluid.



BONES:

Unremarkable.



OTHER FINDINGS:

None.



CT ABDOMEN AND PELVIS:



LIVER:

Unremarkable. No gross lesion or ductal dilatation. 



GALLBLADDER AND BILE DUCTS:

Unremarkable. 



PANCREAS:

Infiltration of soft tissues about pancreatic tail suspicious for 

pancreatitis. Please correlate with laboratory evaluation. No 

pancreatic mass or pancreatic ductal dilatation.



SPLEEN:

Unremarkable. 



ADRENALS:

Unremarkable. No mass. 



KIDNEYS AND URETERS:

1.5 cm cortical cyst mid left kidney.  No other renal mass.  No 

calculus or hydronephrosis. 



VASCULATURE:

Inferior vena caval filter.  No abdominal aortic aneurysm. 



BOWEL:

Unremarkable. No obstruction. No gross mural thickening. 



APPENDIX:

Normal appendix. 



PERITONEUM:

Unremarkable. No free fluid. No free air. 



LYMPH NODES:

Shotty subcentimeter retroperitoneal nodes, nonspecific. No enlarged 

retroperitoneal or pelvic lymphadenopathy. 



BLADDER:

Poorly distended. No gross abnormality. 



REPRODUCTIVE:

Unremarkable prostate. 



BONES:

No acute fracture. 



OTHER FINDINGS:

None.



IMPRESSION:

Extensive bilateral pulmonary infiltrate. Owen bilateral lower lobe 

consolidation, decreasing in right lower lobe. Improved infiltrates 

in the upper lobes though persistent. Cardiomegaly. ET tube.  

Nasogastric tube. Possible acute pancreatitis. Please correlate with 

laboratory and clinical evaluation. Vena caval filter. No bowel 

obstruction.

## 2017-05-20 NOTE — PN
DATE: 05/20/2017



SUBJECTIVE:  The patient is sedated on the ventilator and continues to require FIO2 of 40%.  The ashley
ent is scheduled for a CT scan of the abdomen and noted that a white count is still extremely elevate
d.  The patient is sedated with propofol.



PHYSICAL EXAMINATION:

VITAL SIGNS:  Temperature is 98.8, his pulse is 86, respirations are 18 and his BP is 133/61.

SKIN:  Warm and dry.

HEAD:  Atraumatic, normocephalic.

EYES:  Reactive to light.

EARS, NOSE AND THROAT:  Seem to be within normal limits.

NECK:  Supple.  No JVD, no thyroid enlargement, no lymph nodes.

HEART:  Has regular rate and rhythm, normal S1, S2.

LUNGS:  Reveal mild decreased breath sounds at the bases.

ABDOMEN:  Soft.  Mildly distended.  No organomegaly noted.

GENITALIA AND RECTAL:  Deferred.

MUSCULOSKELETAL:  No joint deformities.

EXTREMITIES:  Reveal 1+ lower extremity edema.

NEUROLOGIC:  The patient is sedated on the ventilator.



LABORATORIES:  His white count is ____.2, hemoglobin is 6.9, hematocrit 19.6 with platelets of 169,00
0.  Blood gas this morning is pending.  Coagulation is pending as well.  Sodium is 130, potassium 5.2
, chloride is 90, CO2 of 21, BUN of 67, creatinine of 6.9 and glucose is 337.  The patient's LFTs are
 still elevated.



IMPRESSION:  The patient has acute liver failure, etiology unclear.  He has respiratory failure on a 
ventilator with FIO2 of 40%.  The patient has acute anemia as well as end-stage renal disease on hemo
dialysis, and history of chronic obstructive pulmonary disease.  He has leukocytosis, of which the et
iology is unclear, and coagulopathy.



PLAN:  We will continue to monitor his labs closely.  We will consider transfusion of packed red bloo
d cells and hematology consult to help evaluate the leukocytosis.  The patient is on blood pressure m
edications at this time.  He is also getting DuoNeb as a bronchodilator.  The patient also is schedul
ed to get lactulose as well.  The patient is on IV antibiotics of meropenem and Protonix as well and 
other antibiotics would be vancomycin.  We will follow his chest x-ray and arterial blood gas and con
tinue to treat aggressively along with the other consultants and the primary care doctor.





__________________________________________

Cedric Guevara MD







cc:



DD: 05/20/2017 09:12:40  [id number]

TT: 05/20/2017 09:36:09

Confirmation # [id number]

Dictation # [id number]

jn

## 2017-05-20 NOTE — PN
DATE: 05/20/2017



SUBJECTIVE:  The patient is a 61-year-old, seen and examined, remains intubated.  Discussed with the 
medical resident.  There were multiple tries to wean him off, but he would become tachycardic and tac
hypneic.  Currently he is intubated and sedated.



PHYSICAL EXAMINATION:

VITAL SIGNS:  He is afebrile, pulse 81, respirations 14, blood pressure 130/56.

LUNGS:  Bilateral fair air flow.

HEART:  S1, S2 audible.

ABDOMEN:  Soft, obese, nontender, no rebound, no guarding.

NEUROLOGIC:  He is sedated and intubated.



LABORATORY EXAMINATION:  WBC is 70,000, hemoglobin 6.9, hematocrit 19.6, platelet of 169.  His PT is 
12.6, INR 1.17.  Chemistry:  Sodium 130, potassium 5.2, chloride 90, CO2 21, BUN 67, creatinine is 6.
9, blood sugar of 337. AST is improving, it is 865. ALT is 811, alkaline phosphatase is 147.  His spu
hair is growing MRSA.  Stool for C. diff is negative.



ASSESSMENT:

1.  Sepsis, source is pulmonary infiltrate.

2.  Acute hepatic failure, etiology unknown.

3.  End-stage renal disease, on hemodialysis.

4.  Respiratory failure.



PLAN:  We will continue patient on vent support yet.  All cultures are negative except sputum culture
 is positive.  Currently, the patient is on propofol.  He is getting nebulizer treatment.  He is on m
etronidazole and meropenem.  Blood sugar is being monitored.  He is on p.o. vancomycin and Xifaxan.  
He is also being covered with Zyvox.  Prognosis is poor.  We will continue current medical treatment.
  Will monitor electrolytes and cultures.





__________________________________________

Antonieta Rae MD







cc:



DD: 05/20/2017 17:35:36  [phone]

TT: 05/20/2017 18:41:51

Confirmation # [id number]

Dictation # [id number]

rn

## 2017-05-20 NOTE — CP.PCM.PN
Subjective





- Date & Time of Evaluation


Date of Evaluation: 05/20/17


Time of Evaluation: 10:00





- Subjective


Subjective: 





Continues to be intubated and sedated. No fevers overnight.





Objective





- Vital Signs/Intake and Output


Vital Signs (last 24 hours): 


 











Temp Pulse Resp BP Pulse Ox


 


 97.6 F   82   28 H  136/53 L  88 L


 


 05/20/17 12:25  05/20/17 12:25  05/20/17 12:25  05/20/17 12:25  05/20/17 06:00








Intake and Output: 


 











 05/20/17 05/20/17





 06:59 18:59


 


Intake Total 1600 120


 


Output Total 0 


 


Balance 1600 120














- Medications


Medications: 


 Current Medications





Albuterol/Ipratropium (Duoneb 3 Mg/0.5 Mg (3 Ml) Ud)  3 ml IH Q2H PRN


   PRN Reason: Shortness of Breath


Albuterol/Ipratropium (Duoneb 3 Mg/0.5 Mg (3 Ml) Ud)  3 ml IH [id number] [facility]


   Last Admin: 05/20/17 07:48 Dose:  3 ml


Heparin Sodium (Porcine) (Heparin)  5,000 units SC Q12 [facility]SE[facility]


   PRN Reason: Protocol


   Last Admin: 05/20/17 11:26 Dose:  5,000 units


Hydralazine HCl (Apresoline)  10 mg IVP Q6 PRN


   PRN Reason: SBP >180


   Last Admin: 05/17/17 02:05 Dose:  10 mg


Propofol (Diprivan)  1,000 mg in 100 mls @ 3.674 mls/hr IV .Q24H PRN; Protocol; 

5 MCG/KG/MIN


   PRN Reason: TITRATE PER MD ORDER


   Last Admin: 05/20/17 11:27 Dose:  30 mcg/kg/min, 22.045 mls/hr


Meropenem 500 mg/ Sodium (Chloride)  100 mls @ 100 mls/hr IVPB Q12 [facility]


   Stop: 05/23/17 06:23


   Last Admin: 05/20/17 11:25 Dose:  100 mls/hr


Insulin Human Regular 100 (units/ Sodium Chloride)  100 mls @ 5 mls/hr IV .Q20H 

PRN; Protocol; 5 UNITS/HR


   PRN Reason: TITRATE PER MD ORDER


   Last Titration: 05/17/17 23:32 Dose:  0 units/hr, 0 mls/hr


Metronidazole (Flagyl)  500 mg in 100 mls @ 100 mls/hr IVPB Q8 [facility]


   PRN Reason: Protocol


   Last Admin: 05/20/17 05:44 Dose:  100 mls/hr


Linezolid (Zyvox 600mg/300ml D5w)  600 mg in 300 mls @ 200 mls/hr IVPB Q12 [facility]


   PRN Reason: Protocol


   Stop: 05/26/17 22:01


   Last Admin: 05/20/17 11:25 Dose:  200 mls/hr


Insulin Detemir (Levemir)  20 unit SC DAILY [facility]


   Last Admin: 05/20/17 11:22 Dose:  20 unit


Insulin Human Regular (Humulin R Med)  0 units SC Q4H [facility]


   Last Admin: 05/20/17 11:24 Dose:  7 units


Lactulose (Enulose)  20 gm PO Q8H [facility]


   Last Admin: 05/20/17 11:26 Dose:  20 gm


Ondansetron HCl (Zofran Inj)  4 mg IVP Q6H PRN


   PRN Reason: Nausea/Vomiting


Pantoprazole Sodium (Protonix Inj)  40 mg IVP DAILY [facility]


   Last Admin: 05/20/17 11:26 Dose:  40 mg


Rifaximin (Xifaxan)  550 mg PO BID [facility]


   PRN Reason: Protocol


   Last Admin: 05/20/17 11:30 Dose:  550 mg


Vancomycin HCl (Vancocin 25 Mg/Ml (Oral Use))  500 mg PO QID [facility]


   PRN Reason: Protocol


   Last Admin: 05/20/17 11:25 Dose:  500 mg











- Labs


Labs: 


 





 05/20/17 05:00 





 05/20/17 05:00 





 











PT  13.6 Seconds (9.9-11.8)  H  05/19/17  17:20    


 


INR  1.26  (0.93-1.08)  H  05/19/17  17:20    


 


APTT  26.8 Seconds (23.7-30.8)   05/18/17  07:00    














- Constitutional


Appears: Other (Intubated and sedated)





- ENT Exam


Additional comments: 





Et tube in place





- Respiratory Exam


Respiratory Exam: Decreased Breath Sounds





- Cardiovascular Exam


Cardiovascular Exam: +S1, +S2





- GI/Abdominal Exam


GI & Abdominal Exam: Soft.  absent: Tenderness





Assessment and Plan





- Assessment and Plan (Free Text)


Plan: 





Assessment


Severe sepsis with ventilator-dependent respiratory failure and acute liver 

failure/acute fulminant hepatitis due to bilateral healthcare-associated 

pneumonia with possible gram positive cocci and/or gram negative bacilli; 

sputum cx now growing MRSA; need to rule out C. diff. infection


HTN


ESRD on HD


dyslipidemia


morbid obesity with BMI 45 


positive HIV JEANNETTE test with negative HIV-1 PCR





Plan


continue Zyvox and continue Merrem (day 5); we have discontinued Doxycycline 

several days ago since it may worsen the liver failure; reviewed CT scan which 

showed the bilateral consolidations and infiltrates in the lungs; reviewed 

ultrasound of the abdomen which showed the fatty liver and hepatomegaly


Patient has also been started on PO vancomycin and IV flagyl pending stool for 

C. diff


patient has history of multiple positive HIV JEANNETTE - will check 4th generation 

test and HIV-1 PCR


Follow up fungal and mycobacterial work up 


Overall prognosis is poor

## 2017-05-20 NOTE — PN
DATE: 05/19/2017



This is an addendum to the GI progress report dictated by Naya Ruiz NP.  Discussed with Dr. Tian, 
intensivist.  The patient's white cell count has gone up to 67,000.  Follow up of the stool for C. di
ff.   We would recommend CT of the abdomen and pelvis and chest to further followup which can be cons
idered after review of the stool test



Thank you very much for allowing us to participate in the care of the patient.





__________________________________________

Leydi Morales MD







cc:



DD: 05/20/2017 00:57:38  [id number]

TT: 05/20/2017 08:12:04

Confirmation # [id number]

Dictation # [id number]

tn

## 2017-05-20 NOTE — CP.PCM.PCO
Physician Communication Note





- Physician Communication Note


Physician Communication Note: Worsening sepsis-prognosis grave/?Hospice

## 2017-05-20 NOTE — PN
DATE: 05/20/2017



SUBJECTIVE:  The patient is seen in the ICU.  He is sedated.  Unresponsive at this time.  Remains on 
ventilatory support.  Earlier, his sedation was weaned off, patient was awake, but was not able to to
lerate decreasing ventilatory support.  He had a CT scan of his abdomen, chest and pelvis done this m
orning; shown to have bilateral pneumonia.



PHYSICAL EXAMINATION:

GENERAL:  Obese middle-aged male lying in bed in the ICU, on mechanical ventilation, sedated.

VITAL SIGNS:  Blood pressure 130/56, heart rate 81, respiratory rate 28, temperature 97.8.

HEENT:  Normocephalic, atraumatic.

NECK:  Supple, no JVD.

LUNGS:  Bilateral equal air entry, no rales.

CARDIAC:  S1, S2, regular rate and rhythm, no murmur, no rub.

ABDOMEN:  Obese, distended, soft, nontender, bowel sounds present.

EXTREMITIES:  2+ pitting edema of the lower extremities, puffy face, increased upper extremity edema.


INTAKE AND OUTPUT:  3550/2500.  The patient had ultrafiltration done and 2500 was removed.



LABORATORY DATA:  WBC 70,000, hemoglobin 6.9, hematocrit 19.6, platelets 169, neutrophils, 56% bands 
7%.  Sodium 130, potassium 5.2, chloride 90, CO2 21, BUN 67, creatinine 6.9, glucose 337, calcium 10.
9, albumin 3.8, bilirubin 5.8, AST is 865, .



Blood gas pH 7.22, pCO2 61, pO2 ____.  INR 1.1.  RPR nonreactive.  Blood cultures no growth.  Sputum 
culture MRSA.  CT of the abdomen:  Extensive bilateral pulmonary infiltrate, rod bilateral lower lo
be consolidation, possible acute pancreatitis.



CURRENT MEDICATIONS:  Diprivan, DuoNeb, Enulose, Flagyl, heparin, insulin, Levemir, mayor meropenem 5
00 q. 12, Protonix, vancomycin, rifaximin, Zofran, Zyvox.



ASSESSMENT AND PLAN:  A 61-year-old male with history of hypertension, obesity, endstage renal diseas
e, severe anemia, multiple blood transfusions, admitted with weakness, fatigue, altered mental status
.  Found to have severe sepsis, acute fulminant hepatitis, respiratory failure.  The patient also fou
nd to have elevated troponins, likely secondary to renal failure.  Etiology of sepsis now appears to 
be severe pneumonia.  Etiology of acute fulminant hepatitis still unclear.  Likely drug induced, less
 likely viral hepatitis.

1.  Sepsis.

2.  Acute fulminant hepatitis.

3.  Severe anemia.

4.  Respiratory failure.

5.  Bilateral pneumonia.

6.  End-stage renal disease.

7.  Anasarca.

8.  Methicillin-resistant Staphylococcus aureus pneumonia.



PLAN:

1.  Antibiotics adjusted for methicillin-resistant Staphylococcus aureus pneumonia as per ID recommen
dations.

2.  Dialysis today, ultrafiltration 2.5 kilograms, received 2 units of blood during dialysis.

3.  Off mucomyst now.

4.  Continue PPI.

5.  Maintain normal glycemia.

6.  Prognosis is extremely grim.



Case discussed with the dialysis nurses, case discussed with ICU residence, case discussed with [facility] staff, is discussed with Dr. Morales.



More than 35 minutes was spent in the care of this critically ill patient.





__________________________________________

Lavonne Maria MD







cc:



DD: 05/20/2017 16:38:38  [phone]

TT: 05/20/2017 18:08:45

Confirmation # [id number]

Dictation # [id number]

helen

## 2017-05-21 LAB
ADD MANUAL DIFF?: YES
ALBUMIN/GLOB SERPL: 0.8 {RATIO}
ALP SERPL-CCNC: 151 U/L
ALT SERPL-CCNC: 624 U/L
AMYLASE SERPL-CCNC: 985 U/L
ANISOCYTOSIS BLD QL SMEAR: (no result)
AST SERPL-CCNC: 648 U/L
BILIRUB SERPL-MCNC: 5 MG/DL
BUN SERPL-MCNC: 44 MG/DL
CALCIUM SERPL-MCNC: 10.9 MG/DL
CHLORIDE SERPL-SCNC: 93 MMOL/L
CO2 SERPL-SCNC: 25 MMOL/L
ERYTHROCYTE [DISTWIDTH] IN BLOOD BY AUTOMATED COUNT: 18.9 %
GLOBULIN SER-MCNC: 5 GM/DL
GLUCOSE SERPL-MCNC: 314 MG/DL
HCT VFR BLD CALC: 22.8 %
HYPOCHROMIA BLD QL SMEAR: (no result)
LIPASE SERPL-CCNC: (no result) U/L
MCH RBC QN AUTO: 31.9 PG
MCHC RBC AUTO-ENTMCNC: 35.5 G/DL
MCV RBC AUTO: 89.8 FL
METAMYELOCYTES NFR BLD: 3 %
MYELOCYTES NFR BLD: 6 %
NEUTROPHILS NFR BLD AUTO: 70 %
NEUTS BAND NFR BLD: 5 %
NRBC BLD AUTO-RTO: 35 %
OVALOCYTES BLD QL SMEAR: SLIGHT
PLATELET # BLD EST: NORMAL 10*3/UL
PLATELET # BLD: 146 10^3/UL
PMV BLD AUTO: 9.8 FL
POIKILOCYTOSIS BLD QL SMEAR: SLIGHT
POTASSIUM SERPL-SCNC: 4.2 MMOL/L
PROT SERPL-MCNC: 8.7 G/DL
SODIUM SERPL-SCNC: 133 MMOL/L
WBC # BLD AUTO: 47.5 10^3/UL
WBC NRBC COR # BLD: 35.2 K/MM3

## 2017-05-21 NOTE — CP.PCM.PCO
Physician Communication Note





- Physician Communication Note


Physician Communication Note: amylase 985/lipase 20,477

## 2017-05-21 NOTE — PN
DATE: 05/21/2017



SUBJECTIVE:  The patient is intubated.



PHYSICAL EXAMINATION:

VITAL SIGNS:  Temperature is 99, pulse of 88, blood pressure is 140/59, respiration is 28.

GENERAL:   The patient comfortable, in no acute distress.

HEENT:   Anicteric sclerae.  Moist mucosa.

NECK:   No JVD or adenopathy.

CARDIAC:   S1/S2.  No murmurs.  No rubs.  Regular.

RESPIRATORY:   Clear to auscultation bilaterally.  No wheezes, rales, or rhonchi.  Good air entry.

ABDOMEN:   Bowel sounds are positive, soft, nontender, and nondistended.

EXTREMITIES:   Lower extremity 1+ edema.  Upper extremity 1+ edema.  Has 1+ pulses.



LABORATORIES:  White count of 47, hemoglobin is 8.1.  Creatinine is 4.2.



CT of the chest, abdomen and pelvis shows extensive bilateral pulmonary infiltrates.



ASSESSMENT:

1.  Respiratory failure, on ventilator.

2.  Sepsis, secondary to pneumonia.

3.  Acute liver failure.

4.  End-stage renal disease.

5.  Hypertension.

6.  Acute anemia.

7.  Anasarca.

8.  Acute hepatitis.



PLAN:  The patient remains critically ill.  He is intubated.  His white count has improved from yeste
rday.  The patient's creatinine is starting to improve.  The liver function has also started to impro
ve.  The patient is on propofol, is going to be on lactulose, is receiving heparin for deep venous th
rombosis prophylaxis, is on Lasix, is on meropenem for antibiotics.  The patient is going to be on li
nezolid _____.  Continue to follow closely.





__________________________________________

Tucker Pollard MD







cc:



DD: 05/21/2017 09:28:50  [id number]

TT: 05/21/2017 10:50:00

Confirmation # [id number]

Dictation # [id number]

en

## 2017-05-21 NOTE — PN
DATE: 05/20/2017



SUBJECTIVE:  This patient was seen and evaluated earlier.  He remains on vent.



PHYSICAL EXAMINATION:

VITAL SIGNS:  Temperature is 97.7, pulse 88.  Blood pressure is ____.

HEENT:  Atraumatic, jaundiced.

NECK:  Supple.

HEART:  S1, S2 heard.

LUNGS:  Bilateral air entry present.

ABDOMEN:  Softly distended.  Mild tenderness present.

EXTREMITIES:  Mild edema present.



LABORATORY DATA:  Hemoglobin is 6.9, hematocrit 19.6, WBC 17.2, platelets 159.  
BUN 67, creatinine 6.9.  Total bilirubin is now 5.8.  AST was 865.  .  
Alkaline phosphatase is 147.



IMPRESSION:  This patient admitted with acute liver failure, etiology is 
unclear.  History of end-stage renal disease on hemodialysis.  The patient is 
also septic with the white cell count is about 70,000.  The patient has a lung 
infiltrate being treated for pneumonia.  ____ and treatment for Clostridium 
difficile.  Stool for Clostridium difficile negative.  Discussed with the 
intensivist regarding the CT scan.  Overall, prognosis remains very guarded.  
Continue the antibiotics.  



Thank you very much for allowing us to participate in the care of this patient.





__________________________________________

Leydi Morales MD







cc:   



DD: 05/20/2017 23:22:43  [id number]

TT: 05/21/2017 08:42:10

Confirmation # [id number]

Dictation # [id number]

jn

PAT

## 2017-05-21 NOTE — PN
DATE: 05/21/2017



SUBJECTIVE:  The patient is resting comfortably on the ventilator with FiO2 of 40%.  The patient cont
inues to have sedation with propofol and no real change is noted this morning.



PHYSICAL EXAMINATION:

VITAL SIGNS:  His temperature is 99.0, his BP is 140/59 with a pulse of 88 and respirations of 16.

SKIN:  Warm and dry.

HEAD:  Atraumatic, normocephalic.

EYES:  Reactive to light.

EARS, NOSE AND THROAT:  Seem to be within normal limits.

NECK:  Supple.  No JVD, no thyroid enlargement, no lymph nodes.

HEART:  Has a regular rate and rhythm.  Normal S1, S2.

LUNGS:  Reveal mild some rhonchi at the bases.

ABDOMEN:  Soft, but obese.  Decreased bowel sounds.

GENITALIA AND RECTAL:  Deferred.

MUSCULOSKELETAL:  No joint deformities.

EXTREMITIES:  Reveal 1+ lower extremity edema.

NEUROLOGIC:  The patient is sedated on the ventilator.



As far as his CT scan of his abdomen and chest and pelvis, the patient has extensive bilateral pleura
l effusions as well as bilateral lower lobe consolidations.  He is noted to have cardiomegaly and it 
is stated that there is possible acute pancreatitis.



LABORATORIES:  Reveal a white count of 47.5, hemoglobin of 8.1, hematocrit 22.8 and platelets of 146,
000.  The patient's sodium is 133, potassium 4.2, chloride 93, CO2 of 25 with a BUN of 44 and a creat
inine of 4.2 as well as a glucose of _____.  The patient's liver enzymes continue to decrease and jaswant
lase and lipase are still elevated.



IMPRESSION:  The patient has acute liver failure, possible pancreatitis, etiology unclear.  He has re
spiratory failure requiring ventilator support with FiO2 of 40%.  The patient also is noted to have a
nemia with end-stage renal disease and is on hemodialysis.  Has a history of chronic obstructive pulm
onary disease and noted to have a leukocytosis, which is slowly improving, etiology unclear as well w
ith a coagulopathy.



PLAN:  We will continue to monitor closely.  His laboratories will be followed and we will correct as
 needed.  Hematology is following closely for the leukocytosis and we will continue with DuoNeb for b
ronchodilators.  The patient is on lactulose and meropenem as well as Protonix and vancomycin.  We wi
ll follow his chest x-ray and arterial blood gas closely, and continue to treat aggressively along wi
th the other consultants and the primary care doctor.





__________________________________________

Cedric Guevara MD







cc:



DD: 05/21/2017 10:08:52  [id number]

TT: 05/21/2017 11:43:13

Confirmation # [id number]

Dictation # [id number]

en

## 2017-05-21 NOTE — CP.PCM.PN
Subjective





- Date & Time of Evaluation


Date of Evaluation: 05/21/17


Time of Evaluation: 11:10





- Subjective


Subjective: 





Patient is still intubated and sedated. Still has watery diarrhea.





Objective





- Vital Signs/Intake and Output


Vital Signs (last 24 hours): 


 











Temp Pulse Resp BP Pulse Ox


 


 99.0 F   88   28 H  140/59 L  95 


 


 05/21/17 04:00  05/20/17 19:00  05/20/17 14:07  05/20/17 19:00  05/20/17 19:00








Intake and Output: 


 











 05/21/17 05/21/17





 06:59 18:59


 


Intake Total 1360 


 


Output Total 410 


 


Balance 950 














- Medications


Medications: 


 Current Medications





Albuterol/Ipratropium (Duoneb 3 Mg/0.5 Mg (3 Ml) Ud)  3 ml IH Q2H PRN


   PRN Reason: Shortness of Breath


Albuterol/Ipratropium (Duoneb 3 Mg/0.5 Mg (3 Ml) Ud)  3 ml IH [id number] [facility]


   Last Admin: 05/21/17 13:54 Dose:  3 ml


Heparin Sodium (Porcine) (Heparin)  5,000 units SC Q12 [facility]


   PRN Reason: Protocol


   Last Admin: 05/21/17 10:35 Dose:  5,000 units


Hydralazine HCl (Apresoline)  10 mg IVP Q6 PRN


   PRN Reason: SBP >180


   Last Admin: 05/17/17 02:05 Dose:  10 mg


Propofol (Diprivan)  1,000 mg in 100 mls @ 3.674 mls/hr IV .Q24H PRN; Protocol; 

5 MCG/KG/MIN


   PRN Reason: TITRATE PER MD ORDER


   Last Titration: 05/21/17 04:30 Dose:  30 mcg/kg/min, 22.045 mls/hr


Meropenem 500 mg/ Sodium (Chloride)  100 mls @ 100 mls/hr IVPB Q12 [facility]


   Stop: 05/23/17 06:23


   Last Admin: 05/21/17 10:34 Dose:  100 mls/hr


Insulin Human Regular 100 (units/ Sodium Chloride)  100 mls @ 5 mls/hr IV .Q20H 

PRN; Protocol; 5 UNITS/HR


   PRN Reason: TITRATE PER MD ORDER


   Last Titration: 05/17/17 23:32 Dose:  0 units/hr, 0 mls/hr


Metronidazole (Flagyl)  500 mg in 100 mls @ 100 mls/hr IVPB Q8 [facility]


   PRN Reason: Protocol


   Last Admin: 05/21/17 05:29 Dose:  100 mls/hr


Linezolid (Zyvox 600mg/300ml D5w)  600 mg in 300 mls @ 200 mls/hr IVPB Q12 [facility]


   PRN Reason: Protocol


   Stop: 05/26/17 22:01


   Last Admin: 05/21/17 10:34 Dose:  200 mls/hr


Insulin Detemir (Levemir)  20 unit SC DAILY [facility]


   Last Admin: 05/21/17 10:37 Dose:  20 unit


Insulin Human Regular (Humulin R Med)  0 units SC Q4H [facility]


   Last Admin: 05/21/17 10:36 Dose:  5 units


Lactulose (Enulose)  20 gm PO Q8H [facility]


   Last Admin: 05/21/17 10:35 Dose:  20 gm


Ondansetron HCl (Zofran Inj)  4 mg IVP Q6H PRN


   PRN Reason: Nausea/Vomiting


Pantoprazole Sodium (Protonix Inj)  40 mg IVP DAILY [facility]


   Last Admin: 05/21/17 10:37 Dose:  40 mg


Rifaximin (Xifaxan)  550 mg PO BID [facility]


   PRN Reason: Protocol


   Last Admin: 05/21/17 10:38 Dose:  550 mg


Vancomycin HCl (Vancocin 25 Mg/Ml (Oral Use))  500 mg PO QID [facility]


   PRN Reason: Protocol


   Last Admin: 05/21/17 10:34 Dose:  500 mg











- Labs


Labs: 


 





 05/21/17 06:00 





 05/21/17 06:00 





 











PT  12.6 Seconds (9.9-11.8)  H  05/20/17  14:15    


 


INR  1.17  (0.93-1.08)  H  05/20/17  14:15    


 


APTT  26.8 Seconds (23.7-30.8)   05/18/17  07:00    














- Constitutional


Appears: Other (Intubated and sedated)





- Neck Exam


Neck Exam: absent: Lymphadenopathy, Meningismus





- Respiratory Exam


Respiratory Exam: Decreased Breath Sounds





- Cardiovascular Exam


Cardiovascular Exam: +S1, +S2





- GI/Abdominal Exam


GI & Abdominal Exam: Soft.  absent: Tenderness





- Extremities Exam


Additional comments: 





generalized edema





Assessment and Plan





- Assessment and Plan (Free Text)


Plan: 





Assessment


Severe sepsis with ventilator-dependent respiratory failure and acute liver 

failure/acute fulminant hepatitis due to bilateral healthcare-associated 

pneumonia with possible gram positive cocci and/or gram negative bacilli; 

sputum cx now growing MRSA; need to rule out C. diff. infection


HTN


ESRD on HD


dyslipidemia


morbid obesity with BMI 45 


positive HIV JEANNETTE test with negative HIV-1 PCR





Plan


continue Zyvox and continue Merrem (day 6); we have discontinued Doxycycline 

several days ago since it may worsen the liver failure; reviewed CT scan which 

showed the bilateral consolidations and infiltrates in the lungs; reviewed 

ultrasound of the abdomen which showed the fatty liver and hepatomegaly


Patient has also been started on PO vancomycin and IV flagyl pending repeat 

stool for C. diff (day 2)


patient has history of multiple positive HIV JEANNETTE - follow up generation test 

and HIV-1 PCR


Follow up fungal and mycobacterial work up 


Overall prognosis is poor

## 2017-05-21 NOTE — PN
DATE: 05/21/2017



SUBJECTIVE:  This patient was seen and evaluated earlier.  The patient remains 
on vent.



PHYSICAL EXAMINATION:

GENERAL:  On examination, remains on vent.  

VITAL SIGNS:  Temperature is 99, blood pressure is 152/62, pulse 90, 
respiration is 16.

HEENT:  Atraumatic, jaundiced.

NECK:  Supple.

HEART:  S1, S2 heard.

LUNGS:  Bilateral air entry present, slightly reduced at base, bilateral 
scattered rhonchi present.

ABDOMEN:  Softly distended.

EXTREMITIES:  Edema present bilaterally.

NEUROLOGICAL:  The patient is on vent, sedated.



LABORATORY DATA:  Hemoglobin is 8.1.  The patient's hemoglobin was 6.9, 
transfused is 8.1.  Hematocrit 22.8.  WBC count has come down to 47.5, platelet 
count is 146.  Chemistry:  Total bilirubin 5, , , alkaline 
phosphatase 151.



CT scan of the abdomen and pelvis done was reviewed, reported extensive 
pneumonia, bilateral infiltrate, rod bilateral lower lobe consolidation.  The 
patient has MRSA pneumonia  The patient also was found to have   mild 
pancreatic changes, some peripancreatic stranding with elevated pancreatic 
enzymes.  There is no fluid collection.



IMPRESSION:  This 61-year-old patient with end-stage renal disease, admitted 
with acute liver failure, sepsis, bilateral pneumonia, methicillin-resistant 
Staphylococcus aureus, on intravenous antibiotics.  Significantly worsening of 
the white cell count.  Had a repeat CAT scan done which also reported in 
addition to the lung infiltrates and pneumonia, some mild pancreatitis changes.
  The patient has gallstones but was a normal common bile duct before.  Would 
recommend at this point, continue the antibiotics and close followup of the 
patient.     Followup of the liver enzymes.  The patient is on imipenem, 
Xifaxan and also on Flagyl.  The patient's common bile duct is was normal,.  
Would consider repeating the ultrasound.  The patient's alkaline phosphatase is 
relatively not elevated significantly.  Other differential diagnosis for the 
pancreatitis to be considered including a drug-induced pancreatitis should also 
be considered.  We will continue to closely follow up her care and suggest 
further management based on the clinical course.  We will also discuss with 
infectious disease.  



Thank you very much for allowing us to participate in the care of the patient.





__________________________________________

Leydi Morales MD







cc:   



DD: 05/21/2017 16:02:09  [id number]

TT: 05/21/2017 16:23:55

Confirmation # [id number]

Dictation # [id number]

sn

MTDD

## 2017-05-22 LAB
ADD MANUAL DIFF?: NO
ALBUMIN/GLOB SERPL: 0.7 {RATIO}
ALP SERPL-CCNC: 129 U/L
ALT SERPL-CCNC: 403 U/L
AMYLASE SERPL-CCNC: 454 U/L
AST SERPL-CCNC: 295 U/L
ATERIAL BLOOD GAS PEEP: 10
BASOPHILS # BLD AUTO: 0.18 K/MM3
BASOPHILS NFR BLD: 0.6 %
BILIRUB SERPL-MCNC: 3 MG/DL
BUN SERPL-MCNC: 58 MG/DL
CALCIUM SERPL-MCNC: 10.8 MG/DL
CHLORIDE SERPL-SCNC: 96 MMOL/L
CMV IGG SERPL IA-ACNC: >10 U/ML
CMV IGM SERPL IA-ACNC: <30 AU/ML
CO2 SERPL-SCNC: 21 MMOL/L
COHGB MFR BLD: 3.4 %
CRYPTOC AG SER QL LA: (no result)
EBV EA (D) AB IGG: 2.03
EBV VCA IGG SER-ACNC: 3.51
EOSINOPHIL # BLD: 0.5 10*3/UL
EOSINOPHIL NFR BLD: 1.8 %
ERYTHROCYTE [DISTWIDTH] IN BLOOD BY AUTOMATED COUNT: 20.7 %
GLOBULIN SER-MCNC: 4.5 GM/DL
GLUCOSE SERPL-MCNC: 159 MG/DL
GRANULOCYTES # BLD: 21.94 10*3/UL
GRANULOCYTES NFR BLD: 74.1 %
HCO3 BLDA-SCNC: 23.5 MMOL/L
HCT VFR BLD CALC: 23.5 %
HHB: 0.7 %
INR PPP: 1.17
LYMPHOCYTES # BLD: 3.6 10*3/UL
LYMPHOCYTES NFR BLD AUTO: 12.3 %
MAGNESIUM SERPL-MCNC: 2 MG/DL
MCH RBC QN AUTO: 30.8 PG
MCHC RBC AUTO-ENTMCNC: 34.5 G/DL
MCV RBC AUTO: 89.4 FL
METHGB MFR BLD: 3.8 %
MONOCYTES # BLD AUTO: 3.3 10*3/UL
MONOCYTES NFR BLD: 11.2 %
O2 CAP BLDA-SCNC: 11.5 ML/DL
O2 CT BLDA-SCNC: 11.4 ML/DL
PH BLDA: 7.28 [PH]
PHOSPHATE SERPL-MCNC: 8.1 MG/DL
PLATELET # BLD: 103 10^3/UL
PMV BLD AUTO: 9.8 FL
PO2 BLDA: 123 MM/HG
POTASSIUM SERPL-SCNC: 4 MMOL/L
PROT SERPL-MCNC: 7.8 G/DL
SAO2 % BLDA: 92 %
SODIUM SERPL-SCNC: 134 MMOL/L
TOXOPLASMA IGG AB: <0.91
WBC # BLD AUTO: 29.6 10^3/UL

## 2017-05-22 NOTE — CON
DATE: 05/22/2017



CONSULT REQUESTED BY:  Dr. Rae.



REASON FOR CONSULTATION:  Leukocytosis.



HISTORY OF PRESENT ILLNESS:  The patient is a 61-year-old male admitted with 
nausea and vomiting.  He was found to have pulmonary hepatic failure.  He has 
history of chronic end-stage renal disease on hemodialysis.  He also has 
respiratory failure, remains intubated.  He has bilateral lung infiltrates.  He 
is being treated with IV antibiotics for sepsis.  White count was elevated to 67
,000.  It declined initially with IV antibiotic, but again was elevated up to 70
,000 on 05/20/2017.  Hemoglobin also declined to 6.9.  He received 3 units of 
blood transfusion since admission.  His current white count is 29,000, which 
has declined significantly over the past 2 days.



PAST MEDICAL HISTORY:  End-stage renal disease, chronic anemia, hypertension, 
morbid obesity, history of gout, congestive cardiac failure, coronary artery 
disease, status post cardiac catheterization, history of anemia, blood 
transfusion in the past.



ALLERGIES:  PENICILLIN.



PAST SURGICAL HISTORY:  Arteriovenous fistula, coronary catheterization. 



FAMILY HISTORY:  Significant for hypertension.



HOME MEDICATIONS:  Amlodipine, multivitamin, nebulizer, enalapril 20 mg daily.



CURRENT MEDICATIONS:  Albuterol, ipratropium, fentanyl drip, heparin 5000 q. 12
, hydralazine p.r.n., Levemir 20 subQ daily, lactulose, Zyvox, meropenem, Flagyl
, Zofran, Protonix, vancomycin.



SOCIAL HISTORY:  Single, lives with her son.



PERSONAL HISTORY:  No history of smoking, drinking, or alcohol abuse.



REVIEW OF SYSTEMS:  As per HPI.  Review of systems cannot be performed; he is 
sedated and intubated.



PHYSICAL EXAMINATION:

GENERAL:  He is intubated and sedated.

VITAL SIGNS:  Heart rate is 63 per minute, blood pressure 117/79, mean blood 
pressure is 73, respiratory rate 26 per minute, oxygen saturation 100% on 
ventilator.  FIO2 40%.

HEENT:  Normal.  Oral mucosa dry.

NECK:  Supple.

CHEST:  Air entry present bilateral.  Bilateral conducted  sounds present.

HEART:  S1, S2 normal.  No murmur, no gallop.

ABDOMEN:  Tender all over, winces on deep palpation.  

EXTREMITIES:  No edema.



LABORATORY DATA:  White count 29,000; hemoglobin 8.1; hematocrit 23.5; platelet 
count 103.  Granulocytes 74%, lymphocytes 12%, monocytes 11%.  Sodium 134, 
potassium 4, anion gap 21, BUN 58, creatinine 6.1, calcium 10.8, magnesium 2.  
Total bilirubin 3.  Amylase and lipase are elevated.



ASSESSMENT:

1.  Leukocytosis.

2.  Anemia.

3.  Respiratory failure.

4.  Sepsis.

5.  End-stage renal disease on hemodialysis.

6.  Acute hepatitis, etiology uncertain.  



PLAN:  

1.  He continues to be intubated, sedated.  Leukocytosis, white count declined 
and now it was elevated to 70,000, likely due to sepsis, but I will send flow 
cytometry for leukemia, lymphoma panel.  BCR-ABL by PCR to rule out CML.  
Hemoglobin and hematocrit stable now.  He received 3 units of PRBCs since 
admission.  No obvious bleeding from any site.

2.  Sepsis.  He is being treated with IV antibiotics as per ID.

3.  End-stage renal disease.  He is currently on hemodialysis.  Nephrology 
following.  

4.  Coagulopathy related to sepsis.  He is on DVT prophylaxis with heparin 5000 
q. 12, continue that. 



Thank you, Dr. Rae, for allowing us to participate in the patient's care.





__________________________________________

Saira Olivo MD







cc:   



DD: 05/22/2017 23:13:46  [id number]

TT: 05/22/2017 23:39:30

Confirmation # [id number]

Dictation # [id number]

mn

MTDD

## 2017-05-22 NOTE — PN
DATE: 05/22/2017



SUBJECTIVE:  The patient is seen in the ICU.  He is currently being weaned off his sedation.  Trial o
f weaning is planned.  He is arousable.  He is opening his eyes on command.  He is responding to pain
ful stimuli.  He is moving his lower extremities.



He answers to questions by nodding his head.  He reports pain.  Unable to describe where the pain is.




PHYSICAL EXAMINATION:

GENERAL:  Middle-aged male seen in the ICU, on mechanical ventilation.  Off propofol for the time laurasarah trevino.

VITAL SIGNS:  Blood pressure 100/40, heart rate 64, respiratory rate 30, temperature 97.2.

HEENT:  Normocephalic, atraumatic, positive pallor.

NECK:  Supple, no JVD.

LUNGS:  Bilateral equal air entry, bilateral rhonchi, no rales appreciated.

CARDIAC:  S1, S2, regular rate and rhythm, no murmur, no rub.

ABDOMEN:  Obese, distended, soft, nontender, bowel sounds present.

EXTREMITIES:  2+ pitting edema of the lower extremities.



INTAKE AND OUTPUT:  2014/500.



LABORATORY DATA:  WBC 29.6, hemoglobin 8.1, hematocrit 23.5, platelets 103.  Sodium 134, potassium 4.
0, chloride 96, CO2 21, BUN 58, creatinine 6.1, glucose 159, calcium 10.8, phosphorus 8.1, magnesium 
2.0, total bili 3.0.  , , , albumin 3.3.  Corrected calcium is 11.3.  Amylase 45
4, lipase 5748.



Sputum culture showing MRSA.



CURRENT MEDICATIONS:  DuoNeb,  Enulose, fentanyl, Flagyl, heparin, insulin, Levemir, meropenem 500 q.
 12, Protonix, vancomycin IV, it is at 500 p.o.  4 times daily, rifaximin, Zofran, Zyvox 600 q. 12.



ASSESSMENT AND PLAN:  A 61-year-old male, presented with complaints of cough, not feeling well, short
ness of breath, altered mental status.  The patient was found to have septic shock, acute liver injur
y, acute fulminant hepatitis.  The patient was also found to have respiratory failure.  He was intuba
lillian.  Etiology of his liver failure is thought to be sepsis related.  The patient had a CT scan of hi
s abdomen and pelvis revealing bilateral pulmonary infiltrates, he has a sputum culture growing methi
cillin-resistant Staphylococcus aureus.  He has methicillin-resistant Staphylococcus aureus pneumonia
.  Also, he was thought to be tender in his abdomen on Saturday.  He had a CT scan of his abdomen don
e.  He also had an amylase and lipase which were elevated.  Hence, the thought is he has acute pancre
atitis.



He also has severe anemia.  His hemoglobin was as low as 6.9.  The patient received 1 unit of blood t
ransfusion on 05/17.  He received total of 3 units of blood transfusion; 2 were given on 05/20.



At this point, the patient is responding to the treatment.  He is clinically somewhat improved.  His 
liver enzymes are improving.  His pancreatic enzymes are improving.  His respiratory status is somewh
at better.  There is going to be a trial of weaning today.



1.  Respiratory failure, methicillin-resistant Staphylococcus aureus pneumonia, bilateral pulmonary i
nfiltrates.

2.  Acute pancreatitis.

3.  Improving liver function status, status post acute fulminant hepatitis.   

4.  Improving WBC count.

5.  Severe anemia, status post total of 3 units of blood transfusion.

6.  Hypercalcemia, in the setting of hepatitis, poor prognostic sign.

7.  Hypoalbuminemia.

8.  End-stage renal disease.



PLAN:

1.  Continue Zyvox for MRSA pneumonia.

2.  Continue oral vancomycin for possible C. diff.

3.  Monitor H and H closely.

4.  Dialysis today, he will be dialyzed for 4 hours, ultrafiltration of 2500.

5.  Trial of weaning.

5.  Supportive care.



Case discussed with Dr. Tian at bedside, ICU nurses, ICU residents.  Case discussed with dialysis sta
ff.



More than 35 minutes were spent in the care of this critically ill patient.





__________________________________________

Lavonne Maria MD







cc:



DD: 05/22/2017 16:03:58  [id number]

TT: 05/22/2017 16:41:12

Confirmation # [id number]

Dictation # [id number]

humberto

## 2017-05-22 NOTE — CP.CCUPN
<Toney Aden - Last Filed: 05/22/17 15:17>





CCU Subjective





- Physician Review


Events Since Last Encounter (Free Text): 





05/22/17 15:18


Toney Aden D.O. PGY-1, Internal Medicine Resident, ICU Progress Note





61 year old male with a PMH of HTN, IDDM, ESRD on dialysis, and HLD who 

presented for nausea and vomiting, progressed into respiratory failure needing 

intubation, found to have hepatic failure of unknown etiology, then found to 

have MRSA pneumonia. Patient was seen and examined at bedside. Patient 

continues to be intubated. No acute overnight events. Previously attempted a 

trail of CPAP and patient did markedly better. Otherwise no acute changes. 

Discussed patient's condition with daughter Paula (next of kin) who is in 

[place] over telephone.


05/22/17 15:50








CCU Objective





- Vital Signs / Intake & Output


Vital Signs (Last 4 hours): 


Vital Signs











  Temp Pulse Resp BP Pulse Ox


 


 05/22/17 12:00  97.2 F L  64  30 H  100/40 L  100











Intake and Output (Last 8hrs): 


 Intake & Output











 05/22/17 05/22/17 05/22/17





 06:59 14:59 22:59


 


Intake Total 1064  


 


Output Total 500  


 


Balance 564  


 


Weight 121.109 kg  


 


Intake:   


 


  IV 1064  


 


      


 


    rfa 264  


 


  Oral 0  


 


Output:   


 


  Urine 0  


 


    Straight 0  


 


  Stool 500  














- Physical Exam


Head: Positive for: Atraumatic, Normocephalic.  Negative for: Tenderness, 

Contusion, Swelling, Ecchymosis, Abrasion, Laceration, Other


Pupils: Positive for: PERRL.  Negative for: Sluggish, Non-Reactive, Pinpoint, 

Other


Extroacular Muscles: Positive for: EOMI


Conjunctiva: Positive for: Normal


Ears: Positive for: Normal


Mouth: Positive for: Moist Mucous Membranes


Pharnyx: Positive for: Other (ET in place)


Nose (External): Positive for: Atraumatic


Neck: Positive for: Trachea Midline.  Negative for: JVD, Lymphadenopathy


Respiratory/Chest: Positive for: Clear to Auscultation, Good Air Exchange, 

Rhonchi (R>L).  Negative for: Respiratory Distress, Accessory Muscle Use


Cardiovascular: Positive for: Regular Rate and Rhythm, Normal S1, S2.  Negative 

for: Murmurs, Rub, Gallop


Abdomen: Positive for: Tenderness (diffuse, grimaces), Normal Bowel Sounds.  

Negative for: Distention, Peritoneal Signs, Rebound, Guarding


Back: Positive for: Normal Inspection.  Negative for: CVA Tenderness, Midline 

Tenderness, Paraspinal Tenderness


Upper Extremity: Positive for: Normal Inspection, Tenderness.  Negative for: 

Cyanosis, Edema


Lower Extremity: Positive for: Normal Inspection, Edema (+3), CALF TENDERNESS


Neurological: Positive for: Other (off sedation awake, somewhat alert, nods and 

shakes head for questions, follow simple commands sporadically, seen moving all 

extremities, withdraws)


Skin: Positive for: Warm, Dry, Normal Color.  Negative for: Rashes





- Medications


Active Medications: 


Active Medications











Generic Name Dose Route Start Last Admin





  Trade Name Freq  PRN Reason Stop Dose Admin


 


Albuterol/Ipratropium  3 ml  05/16/17 00:33  





  Duoneb 3 Mg/0.5 Mg (3 Ml) Ud  IH   





  Q2H PRN   





  Shortness of Breath   


 


Albuterol/Ipratropium  3 ml  05/16/17 02:00  05/22/17 13:39





  Duoneb 3 Mg/0.5 Mg (3 Ml) Ud  IH   3 ml





  [id number] OSEAS   Administration


 


Heparin Sodium (Porcine)  5,000 units  05/16/17 10:00  05/22/17 09:52





  Heparin  SC   5,000 units





  Q12 OSEAS   Administration





  Protocol   


 


Hydralazine HCl  10 mg  05/16/17 02:36  05/17/17 02:05





  Apresoline  IVP   10 mg





  Q6 PRN   Administration





  SBP >180   


 


Meropenem 500 mg/ Sodium  100 mls @ 100 mls/hr  05/16/17 06:22  05/22/17 10:24





  Chloride  IVPB  05/23/17 06:23  100 mls/hr





  Q12 OSEAS   Administration


 


Metronidazole  500 mg in 100 mls @ 100 mls/hr  05/19/17 14:00  05/22/17 05:17





  Flagyl  IVPB   100 mls/hr





  Q8 OSEAS   Administration





  Protocol   


 


Linezolid  600 mg in 300 mls @ 200 mls/hr  05/19/17 22:00  05/22/17 09:49





  Zyvox 600mg/300ml D5w  IVPB  05/26/17 22:01  200 mls/hr





  Q12 OSEAS   Administration





  Protocol   


 


Fentanyl Citrate  1,000 mcg in 100 mls @ 10 mls/hr  05/22/17 09:14  05/22/17 09:

36





  Fentanyl Citrate/Sodium Chloride 1 Mg/100 Ml  IV   100 mcg/hr





  .Q10H PRN   10 mls/hr





  TITRATE PER MD ORDER   Administration





  Protocol   





  100 MCG/HR   


 


Dexmedetomidine HCl  400 mcg in 100 mls @ 12.111 mls/hr  05/22/17 09:15  05/22/ 17 09:48





  Precedex 4 Mcg/Ml (100 Ml)  IV   0.4 mcg/kg/hr





  .Q8H16M PRN   12.111 mls/hr





  Agitation   Administration





  Protocol   





  0.4 MCG/KG/HR   


 


Insulin Detemir  20 unit  05/18/17 12:10  05/21/17 10:37





  Levemir  SC   20 unit





  DAILY OSEAS   Administration


 


Insulin Human Regular  0 units  05/22/17 01:12  05/22/17 04:25





  Humulin R Med  SC   Not Given





  Q4 OSEAS   


 


Lactulose  20 gm  05/16/17 10:30  05/22/17 02:18





  Enulose  PO   20 gm





  Q8H OSEAS   Administration


 


Ondansetron HCl  4 mg  05/15/17 22:41  





  Zofran Inj  IVP   





  Q6H PRN   





  Nausea/Vomiting   


 


Pantoprazole Sodium  40 mg  05/16/17 10:00  05/22/17 10:21





  Protonix Inj  IVP   40 mg





  DAILY OSEAS   Administration


 


Rifaximin  550 mg  05/16/17 10:30  05/22/17 09:50





  Xifaxan  PO   Not Given





  BID OSEAS   





  Protocol   


 


Vancomycin HCl  500 mg  05/19/17 14:00  05/22/17 09:14





  Vancocin 25 Mg/Ml (Oral Use)  PO   500 mg





  QID OSEAS   Administration





  Protocol   














- Patient Studies


Lab Studies: 


 Microbiology Studies











 05/19/17 16:14 Gram Stain - Final





 Sputum Sputum Culture - Final





    Methicillin Resistant S Aureus








 Lab Studies











  05/22/17 05/22/17 05/22/17 Range/Units





  11:11 10:00 06:40 


 


WBC     (4.5-11.0)  10^3/ul


 


RBC     (3.5-6.1)  10^6/uL


 


Hgb     (14.0-18.0)  gm/dL


 


Hct     (42.0-52.0)  %


 


MCV     (80.0-105.0)  fL


 


MCH     (25.0-35.0)  pg


 


MCHC     (31.0-37.0)  g/dl


 


RDW     (11.5-14.5)  %


 


Plt Count     (120.0-450.0)  10^3/uL


 


MPV     (7.0-11.0)  fl


 


Gran %     (50.0-68.0)  %


 


Lymph % (Auto)     (22.0-35.0)  %


 


Mono % (Auto)     (1.0-6.0)  %


 


Eos % (Auto)     (1.5-5.0)  %


 


Baso % (Auto)     (0.0-3.0)  %


 


Gran #     (1.4-6.5)  


 


Lymph #     (1.2-3.4)  


 


Mono #     (0.1-0.6)  


 


Eos #     (0.0-0.7)  


 


Baso #     (0.0-2.0)  K/mm3


 


PT   12.6 H   (9.9-11.8)  Seconds


 


INR   1.17 H   (0.93-1.08)  


 


pCO2     (35-45)  mm/Hg


 


pO2     ()  mm/Hg


 


HCO3     (21-28)  mmol/L


 


ABG pH     (7.35-7.45)  


 


ABG Total CO2     (22-28)  mmol.L


 


ABG O2 Saturation     (95-98)  %


 


ABG O2 Content     (15-23)  ML/dl


 


ABG Base Excess     (-2.0-3.0)  mmol/L


 


ABG Hemoglobin     (11.7-17.4)  g/dL


 


ABG Carboxyhemoglobin     (0.5-1.5)  %


 


POC ABG HHb (Measured)     (0-5)  %


 


ABG Methemoglobin     (0.0-3.0)  %


 


ABG O2 Capacity     (16-24)  mL/dl


 


ABG Potassium     (3.6-5.2)  mmol/L


 


Hgb O2 Saturation     (95.0-98.0)  %


 


Sodium     (132-148)  mmol/L


 


Glucose     ()  mg/dl


 


Lactate     (0.7-2.1)  mmol/L


 


FiO2     %


 


PEEP     


 


Pressure Support     


 


Potassium     (3.6-5.0)  mmol/L


 


Chloride     ()  mmol/L


 


Carbon Dioxide     (21-33)  mmol/L


 


Anion Gap     (10-20)  


 


BUN     (7-21)  mg/dL


 


Creatinine     (0.5-1.4)  mg/dL


 


Est GFR (African Amer)     


 


Est GFR (Non-Af Amer)     


 


POC Glucose (mg/dL)  181 H    ()  mg/dL


 


Random Glucose     ()  mg/dL


 


Calcium     (8.4-10.5)  mg/dL


 


Phosphorus    8.1 H  (2.5-4.5)  mg/dL


 


Magnesium    2.0  (1.7-2.2)  mg/dL


 


Total Bilirubin     (0.2-1.3)  mg/dL


 


GGT    242 H  (8-78)  U/L


 


AST     (15-59)  U/L


 


ALT     (7-56)  U/L


 


Alkaline Phosphatase     ()  U/L


 


Total Protein     (5.8-8.3)  g/dL


 


Albumin     (3.0-4.8)  g/dL


 


Globulin     gm/dL


 


Albumin/Globulin Ratio     (1.1-1.8)  


 


Amylase    454 H  ()  U/L


 


Lipase    5748 H  ()  U/L


 


Arterial Blood Potassium     (3.6-5.2)  mmol/L


 


Cryptococcus Ab     


 


CMV Specimen Source     


 


CMV IgG Ab     U/mL


 


CMV IgM Ab     AU/mL


 


CMV DNA Quant PCR     IU/mL


 


CMV Qnt PCR log IU/mL     Log IU/mL


 


P. carinii Source     


 


P.carinii Concentration     (())  


 


Pneumocyst carinii DFA     


 


Beta-(1,3)-D-Glucan     (())  pg/mL


 


B-(1,3)-D-Glucan Intrp     


 


WB Flow Cytometry     














  05/22/17 05/22/17 05/22/17 Range/Units





  06:40 06:40 06:40 


 


WBC   29.6 H* D   (4.5-11.0)  10^3/ul


 


RBC   2.63 L   (3.5-6.1)  10^6/uL


 


Hgb   8.1 L   (14.0-18.0)  gm/dL


 


Hct   23.5 L   (42.0-52.0)  %


 


MCV   89.4   (80.0-105.0)  fL


 


MCH   30.8   (25.0-35.0)  pg


 


MCHC   34.5   (31.0-37.0)  g/dl


 


RDW   20.7 H   (11.5-14.5)  %


 


Plt Count   103 L   (120.0-450.0)  10^3/uL


 


MPV   9.8   (7.0-11.0)  fl


 


Gran %   74.1 H   (50.0-68.0)  %


 


Lymph % (Auto)   12.3 L   (22.0-35.0)  %


 


Mono % (Auto)   11.2 H   (1.0-6.0)  %


 


Eos % (Auto)   1.8   (1.5-5.0)  %


 


Baso % (Auto)   0.6   (0.0-3.0)  %


 


Gran #   21.94 H   (1.4-6.5)  


 


Lymph #   3.6 H   (1.2-3.4)  


 


Mono #   3.3 H   (0.1-0.6)  


 


Eos #   0.5   (0.0-0.7)  


 


Baso #   0.18   (0.0-2.0)  K/mm3


 


PT     (9.9-11.8)  Seconds


 


INR     (0.93-1.08)  


 


pCO2     (35-45)  mm/Hg


 


pO2     ()  mm/Hg


 


HCO3     (21-28)  mmol/L


 


ABG pH     (7.35-7.45)  


 


ABG Total CO2     (22-28)  mmol.L


 


ABG O2 Saturation     (95-98)  %


 


ABG O2 Content     (15-23)  ML/dl


 


ABG Base Excess     (-2.0-3.0)  mmol/L


 


ABG Hemoglobin     (11.7-17.4)  g/dL


 


ABG Carboxyhemoglobin     (0.5-1.5)  %


 


POC ABG HHb (Measured)     (0-5)  %


 


ABG Methemoglobin     (0.0-3.0)  %


 


ABG O2 Capacity     (16-24)  mL/dl


 


ABG Potassium     (3.6-5.2)  mmol/L


 


Hgb O2 Saturation     (95.0-98.0)  %


 


Sodium  134    (132-148)  mmol/L


 


Glucose     ()  mg/dl


 


Lactate     (0.7-2.1)  mmol/L


 


FiO2     %


 


PEEP     


 


Pressure Support     


 


Potassium  4.0    (3.6-5.0)  mmol/L


 


Chloride  96 L    ()  mmol/L


 


Carbon Dioxide  21    (21-33)  mmol/L


 


Anion Gap  21 H    (10-20)  


 


BUN  58 H    (7-21)  mg/dL


 


Creatinine  6.1 H    (0.5-1.4)  mg/dL


 


Est GFR ( Amer)  11    


 


Est GFR (Non-Af Amer)  9    


 


POC Glucose (mg/dL)     ()  mg/dL


 


Random Glucose  159 H    ()  mg/dL


 


Calcium  10.8 H    (8.4-10.5)  mg/dL


 


Phosphorus     (2.5-4.5)  mg/dL


 


Magnesium     (1.7-2.2)  mg/dL


 


Total Bilirubin  3.0 H    (0.2-1.3)  mg/dL


 


GGT     (8-78)  U/L


 


AST  295 H    (15-59)  U/L


 


ALT  403 H    (7-56)  U/L


 


Alkaline Phosphatase  129    ()  U/L


 


Total Protein  7.8    (5.8-8.3)  g/dL


 


Albumin  3.3    (3.0-4.8)  g/dL


 


Globulin  4.5    gm/dL


 


Albumin/Globulin Ratio  0.7 L    (1.1-1.8)  


 


Amylase     ()  U/L


 


Lipase     ()  U/L


 


Arterial Blood Potassium     (3.6-5.2)  mmol/L


 


Cryptococcus Ab     


 


CMV Specimen Source     


 


CMV IgG Ab     U/mL


 


CMV IgM Ab     AU/mL


 


CMV DNA Quant PCR     IU/mL


 


CMV Qnt PCR log IU/mL     Log IU/mL


 


P. carinii Source     


 


P.carinii Concentration     (())  


 


Pneumocyst carinii DFA     


 


Beta-(1,3)-D-Glucan     (())  pg/mL


 


B-(1,3)-D-Glucan Intrp     


 


WB Flow Cytometry    Reference test  














  05/22/17 05/22/17 05/22/17 Range/Units





  05:50 04:04 00:40 


 


WBC     (4.5-11.0)  10^3/ul


 


RBC     (3.5-6.1)  10^6/uL


 


Hgb     (14.0-18.0)  gm/dL


 


Hct     (42.0-52.0)  %


 


MCV     (80.0-105.0)  fL


 


MCH     (25.0-35.0)  pg


 


MCHC     (31.0-37.0)  g/dl


 


RDW     (11.5-14.5)  %


 


Plt Count     (120.0-450.0)  10^3/uL


 


MPV     (7.0-11.0)  fl


 


Gran %     (50.0-68.0)  %


 


Lymph % (Auto)     (22.0-35.0)  %


 


Mono % (Auto)     (1.0-6.0)  %


 


Eos % (Auto)     (1.5-5.0)  %


 


Baso % (Auto)     (0.0-3.0)  %


 


Gran #     (1.4-6.5)  


 


Lymph #     (1.2-3.4)  


 


Mono #     (0.1-0.6)  


 


Eos #     (0.0-0.7)  


 


Baso #     (0.0-2.0)  K/mm3


 


PT     (9.9-11.8)  Seconds


 


INR     (0.93-1.08)  


 


pCO2  50 H    (35-45)  mm/Hg


 


pO2  123.0 H    ()  mm/Hg


 


HCO3  23.5    (21-28)  mmol/L


 


ABG pH  7.28 L    (7.35-7.45)  


 


ABG Total CO2  25.0    (22-28)  mmol.L


 


ABG O2 Saturation  99.2 H    (95-98)  %


 


ABG O2 Content  11.4 L    (15-23)  ML/dl


 


ABG Base Excess  -3.3 L    (-2.0-3.0)  mmol/L


 


ABG Hemoglobin  8.6 L    (11.7-17.4)  g/dL


 


ABG Carboxyhemoglobin  3.4 H    (0.5-1.5)  %


 


POC ABG HHb (Measured)  0.7    (0-5)  %


 


ABG Methemoglobin  3.8 H    (0.0-3.0)  %


 


ABG O2 Capacity  11.5 L    (16-24)  mL/dl


 


ABG Potassium     (3.6-5.2)  mmol/L


 


Hgb O2 Saturation  92.0 L    (95.0-98.0)  %


 


Sodium     (132-148)  mmol/L


 


Glucose     ()  mg/dl


 


Lactate     (0.7-2.1)  mmol/L


 


FiO2  40.0    %


 


PEEP     


 


Pressure Support     


 


Potassium     (3.6-5.0)  mmol/L


 


Chloride     ()  mmol/L


 


Carbon Dioxide     (21-33)  mmol/L


 


Anion Gap     (10-20)  


 


BUN     (7-21)  mg/dL


 


Creatinine     (0.5-1.4)  mg/dL


 


Est GFR (African Amer)     


 


Est GFR (Non-Af Amer)     


 


POC Glucose (mg/dL)   183 H  224 H  ()  mg/dL


 


Random Glucose     ()  mg/dL


 


Calcium     (8.4-10.5)  mg/dL


 


Phosphorus     (2.5-4.5)  mg/dL


 


Magnesium     (1.7-2.2)  mg/dL


 


Total Bilirubin     (0.2-1.3)  mg/dL


 


GGT     (8-78)  U/L


 


AST     (15-59)  U/L


 


ALT     (7-56)  U/L


 


Alkaline Phosphatase     ()  U/L


 


Total Protein     (5.8-8.3)  g/dL


 


Albumin     (3.0-4.8)  g/dL


 


Globulin     gm/dL


 


Albumin/Globulin Ratio     (1.1-1.8)  


 


Amylase     ()  U/L


 


Lipase     ()  U/L


 


Arterial Blood Potassium     (3.6-5.2)  mmol/L


 


Cryptococcus Ab     


 


CMV Specimen Source     


 


CMV IgG Ab     U/mL


 


CMV IgM Ab     AU/mL


 


CMV DNA Quant PCR     IU/mL


 


CMV Qnt PCR log IU/mL     Log IU/mL


 


P. carinii Source     


 


P.carinii Concentration     (())  


 


Pneumocyst carinii DFA     


 


Beta-(1,3)-D-Glucan     (())  pg/mL


 


B-(1,3)-D-Glucan Intrp     


 


WB Flow Cytometry     














  05/21/17 05/21/17 05/21/17 Range/Units





  20:59 19:09 15:57 


 


WBC     (4.5-11.0)  10^3/ul


 


RBC     (3.5-6.1)  10^6/uL


 


Hgb     (14.0-18.0)  gm/dL


 


Hct     (42.0-52.0)  %


 


MCV     (80.0-105.0)  fL


 


MCH     (25.0-35.0)  pg


 


MCHC     (31.0-37.0)  g/dl


 


RDW     (11.5-14.5)  %


 


Plt Count     (120.0-450.0)  10^3/uL


 


MPV     (7.0-11.0)  fl


 


Gran %     (50.0-68.0)  %


 


Lymph % (Auto)     (22.0-35.0)  %


 


Mono % (Auto)     (1.0-6.0)  %


 


Eos % (Auto)     (1.5-5.0)  %


 


Baso % (Auto)     (0.0-3.0)  %


 


Gran #     (1.4-6.5)  


 


Lymph #     (1.2-3.4)  


 


Mono #     (0.1-0.6)  


 


Eos #     (0.0-0.7)  


 


Baso #     (0.0-2.0)  K/mm3


 


PT     (9.9-11.8)  Seconds


 


INR     (0.93-1.08)  


 


pCO2     (35-45)  mm/Hg


 


pO2     ()  mm/Hg


 


HCO3     (21-28)  mmol/L


 


ABG pH     (7.35-7.45)  


 


ABG Total CO2     (22-28)  mmol.L


 


ABG O2 Saturation     (95-98)  %


 


ABG O2 Content     (15-23)  ML/dl


 


ABG Base Excess     (-2.0-3.0)  mmol/L


 


ABG Hemoglobin     (11.7-17.4)  g/dL


 


ABG Carboxyhemoglobin     (0.5-1.5)  %


 


POC ABG HHb (Measured)     (0-5)  %


 


ABG Methemoglobin     (0.0-3.0)  %


 


ABG O2 Capacity     (16-24)  mL/dl


 


ABG Potassium     (3.6-5.2)  mmol/L


 


Hgb O2 Saturation     (95.0-98.0)  %


 


Sodium     (132-148)  mmol/L


 


Glucose     ()  mg/dl


 


Lactate     (0.7-2.1)  mmol/L


 


FiO2     %


 


PEEP     


 


Pressure Support     


 


Potassium     (3.6-5.0)  mmol/L


 


Chloride     ()  mmol/L


 


Carbon Dioxide     (21-33)  mmol/L


 


Anion Gap     (10-20)  


 


BUN     (7-21)  mg/dL


 


Creatinine     (0.5-1.4)  mg/dL


 


Est GFR (African Amer)     


 


Est GFR (Non-Af Amer)     


 


POC Glucose (mg/dL)  227 H  272 H  314 H  ()  mg/dL


 


Random Glucose     ()  mg/dL


 


Calcium     (8.4-10.5)  mg/dL


 


Phosphorus     (2.5-4.5)  mg/dL


 


Magnesium     (1.7-2.2)  mg/dL


 


Total Bilirubin     (0.2-1.3)  mg/dL


 


GGT     (8-78)  U/L


 


AST     (15-59)  U/L


 


ALT     (7-56)  U/L


 


Alkaline Phosphatase     ()  U/L


 


Total Protein     (5.8-8.3)  g/dL


 


Albumin     (3.0-4.8)  g/dL


 


Globulin     gm/dL


 


Albumin/Globulin Ratio     (1.1-1.8)  


 


Amylase     ()  U/L


 


Lipase     ()  U/L


 


Arterial Blood Potassium     (3.6-5.2)  mmol/L


 


Cryptococcus Ab     


 


CMV Specimen Source     


 


CMV IgG Ab     U/mL


 


CMV IgM Ab     AU/mL


 


CMV DNA Quant PCR     IU/mL


 


CMV Qnt PCR log IU/mL     Log IU/mL


 


P. carinii Source     


 


P.carinii Concentration     (())  


 


Pneumocyst carinii DFA     


 


Beta-(1,3)-D-Glucan     (())  pg/mL


 


B-(1,3)-D-Glucan Intrp     


 


WB Flow Cytometry     














  05/21/17 05/21/17 05/21/17 Range/Units





  10:22 06:14 03:07 


 


WBC     (4.5-11.0)  10^3/ul


 


RBC     (3.5-6.1)  10^6/uL


 


Hgb     (14.0-18.0)  gm/dL


 


Hct     (42.0-52.0)  %


 


MCV     (80.0-105.0)  fL


 


MCH     (25.0-35.0)  pg


 


MCHC     (31.0-37.0)  g/dl


 


RDW     (11.5-14.5)  %


 


Plt Count     (120.0-450.0)  10^3/uL


 


MPV     (7.0-11.0)  fl


 


Gran %     (50.0-68.0)  %


 


Lymph % (Auto)     (22.0-35.0)  %


 


Mono % (Auto)     (1.0-6.0)  %


 


Eos % (Auto)     (1.5-5.0)  %


 


Baso % (Auto)     (0.0-3.0)  %


 


Gran #     (1.4-6.5)  


 


Lymph #     (1.2-3.4)  


 


Mono #     (0.1-0.6)  


 


Eos #     (0.0-0.7)  


 


Baso #     (0.0-2.0)  K/mm3


 


PT     (9.9-11.8)  Seconds


 


INR     (0.93-1.08)  


 


pCO2     (35-45)  mm/Hg


 


pO2     ()  mm/Hg


 


HCO3     (21-28)  mmol/L


 


ABG pH     (7.35-7.45)  


 


ABG Total CO2     (22-28)  mmol.L


 


ABG O2 Saturation     (95-98)  %


 


ABG O2 Content     (15-23)  ML/dl


 


ABG Base Excess     (-2.0-3.0)  mmol/L


 


ABG Hemoglobin     (11.7-17.4)  g/dL


 


ABG Carboxyhemoglobin     (0.5-1.5)  %


 


POC ABG HHb (Measured)     (0-5)  %


 


ABG Methemoglobin     (0.0-3.0)  %


 


ABG O2 Capacity     (16-24)  mL/dl


 


ABG Potassium     (3.6-5.2)  mmol/L


 


Hgb O2 Saturation     (95.0-98.0)  %


 


Sodium     (132-148)  mmol/L


 


Glucose     ()  mg/dl


 


Lactate     (0.7-2.1)  mmol/L


 


FiO2     %


 


PEEP     


 


Pressure Support     


 


Potassium     (3.6-5.0)  mmol/L


 


Chloride     ()  mmol/L


 


Carbon Dioxide     (21-33)  mmol/L


 


Anion Gap     (10-20)  


 


BUN     (7-21)  mg/dL


 


Creatinine     (0.5-1.4)  mg/dL


 


Est GFR (African Amer)     


 


Est GFR (Non-Af Amer)     


 


POC Glucose (mg/dL)  280 H  333 H  325 H  ()  mg/dL


 


Random Glucose     ()  mg/dL


 


Calcium     (8.4-10.5)  mg/dL


 


Phosphorus     (2.5-4.5)  mg/dL


 


Magnesium     (1.7-2.2)  mg/dL


 


Total Bilirubin     (0.2-1.3)  mg/dL


 


GGT     (8-78)  U/L


 


AST     (15-59)  U/L


 


ALT     (7-56)  U/L


 


Alkaline Phosphatase     ()  U/L


 


Total Protein     (5.8-8.3)  g/dL


 


Albumin     (3.0-4.8)  g/dL


 


Globulin     gm/dL


 


Albumin/Globulin Ratio     (1.1-1.8)  


 


Amylase     ()  U/L


 


Lipase     ()  U/L


 


Arterial Blood Potassium     (3.6-5.2)  mmol/L


 


Cryptococcus Ab     


 


CMV Specimen Source     


 


CMV IgG Ab     U/mL


 


CMV IgM Ab     AU/mL


 


CMV DNA Quant PCR     IU/mL


 


CMV Qnt PCR log IU/mL     Log IU/mL


 


P. carinii Source     


 


P.carinii Concentration     (())  


 


Pneumocyst carinii DFA     


 


Beta-(1,3)-D-Glucan     (())  pg/mL


 


B-(1,3)-D-Glucan Intrp     


 


WB Flow Cytometry     














  05/20/17 05/20/17 05/20/17 Range/Units





  22:37 16:38 15:30 


 


WBC     (4.5-11.0)  10^3/ul


 


RBC     (3.5-6.1)  10^6/uL


 


Hgb     (14.0-18.0)  gm/dL


 


Hct     (42.0-52.0)  %


 


MCV     (80.0-105.0)  fL


 


MCH     (25.0-35.0)  pg


 


MCHC     (31.0-37.0)  g/dl


 


RDW     (11.5-14.5)  %


 


Plt Count     (120.0-450.0)  10^3/uL


 


MPV     (7.0-11.0)  fl


 


Gran %     (50.0-68.0)  %


 


Lymph % (Auto)     (22.0-35.0)  %


 


Mono % (Auto)     (1.0-6.0)  %


 


Eos % (Auto)     (1.5-5.0)  %


 


Baso % (Auto)     (0.0-3.0)  %


 


Gran #     (1.4-6.5)  


 


Lymph #     (1.2-3.4)  


 


Mono #     (0.1-0.6)  


 


Eos #     (0.0-0.7)  


 


Baso #     (0.0-2.0)  K/mm3


 


PT     (9.9-11.8)  Seconds


 


INR     (0.93-1.08)  


 


pCO2     (35-45)  mm/Hg


 


pO2     ()  mm/Hg


 


HCO3     (21-28)  mmol/L


 


ABG pH     (7.35-7.45)  


 


ABG Total CO2     (22-28)  mmol.L


 


ABG O2 Saturation     (95-98)  %


 


ABG O2 Content     (15-23)  ML/dl


 


ABG Base Excess     (-2.0-3.0)  mmol/L


 


ABG Hemoglobin     (11.7-17.4)  g/dL


 


ABG Carboxyhemoglobin     (0.5-1.5)  %


 


POC ABG HHb (Measured)     (0-5)  %


 


ABG Methemoglobin     (0.0-3.0)  %


 


ABG O2 Capacity     (16-24)  mL/dl


 


ABG Potassium     (3.6-5.2)  mmol/L


 


Hgb O2 Saturation     (95.0-98.0)  %


 


Sodium     (132-148)  mmol/L


 


Glucose     ()  mg/dl


 


Lactate     (0.7-2.1)  mmol/L


 


FiO2     %


 


PEEP     


 


Pressure Support     


 


Potassium     (3.6-5.0)  mmol/L


 


Chloride     ()  mmol/L


 


Carbon Dioxide     (21-33)  mmol/L


 


Anion Gap     (10-20)  


 


BUN     (7-21)  mg/dL


 


Creatinine     (0.5-1.4)  mg/dL


 


Est GFR (African Amer)     


 


Est GFR (Non-Af Amer)     


 


POC Glucose (mg/dL)  323 H  255 H   ()  mg/dL


 


Random Glucose     ()  mg/dL


 


Calcium     (8.4-10.5)  mg/dL


 


Phosphorus     (2.5-4.5)  mg/dL


 


Magnesium     (1.7-2.2)  mg/dL


 


Total Bilirubin     (0.2-1.3)  mg/dL


 


GGT     (8-78)  U/L


 


AST     (15-59)  U/L


 


ALT     (7-56)  U/L


 


Alkaline Phosphatase     ()  U/L


 


Total Protein     (5.8-8.3)  g/dL


 


Albumin     (3.0-4.8)  g/dL


 


Globulin     gm/dL


 


Albumin/Globulin Ratio     (1.1-1.8)  


 


Amylase     ()  U/L


 


Lipase     ()  U/L


 


Arterial Blood Potassium     (3.6-5.2)  mmol/L


 


Cryptococcus Ab     


 


CMV Specimen Source     


 


CMV IgG Ab     U/mL


 


CMV IgM Ab     AU/mL


 


CMV DNA Quant PCR     IU/mL


 


CMV Qnt PCR log IU/mL     Log IU/mL


 


P. carinii Source    Sputum  


 


P.carinii Concentration      (())  


 


Pneumocyst carinii DFA    See note  


 


Beta-(1,3)-D-Glucan     (())  pg/mL


 


B-(1,3)-D-Glucan Intrp     


 


WB Flow Cytometry     














  05/20/17 05/20/17 05/19/17 Range/Units





  11:16 02:40 16:12 


 


WBC     (4.5-11.0)  10^3/ul


 


RBC     (3.5-6.1)  10^6/uL


 


Hgb     (14.0-18.0)  gm/dL


 


Hct     (42.0-52.0)  %


 


MCV     (80.0-105.0)  fL


 


MCH     (25.0-35.0)  pg


 


MCHC     (31.0-37.0)  g/dl


 


RDW     (11.5-14.5)  %


 


Plt Count     (120.0-450.0)  10^3/uL


 


MPV     (7.0-11.0)  fl


 


Gran %     (50.0-68.0)  %


 


Lymph % (Auto)     (22.0-35.0)  %


 


Mono % (Auto)     (1.0-6.0)  %


 


Eos % (Auto)     (1.5-5.0)  %


 


Baso % (Auto)     (0.0-3.0)  %


 


Gran #     (1.4-6.5)  


 


Lymph #     (1.2-3.4)  


 


Mono #     (0.1-0.6)  


 


Eos #     (0.0-0.7)  


 


Baso #     (0.0-2.0)  K/mm3


 


PT     (9.9-11.8)  Seconds


 


INR     (0.93-1.08)  


 


pCO2     (35-45)  mm/Hg


 


pO2     ()  mm/Hg


 


HCO3     (21-28)  mmol/L


 


ABG pH     (7.35-7.45)  


 


ABG Total CO2     (22-28)  mmol.L


 


ABG O2 Saturation     (95-98)  %


 


ABG O2 Content     (15-23)  ML/dl


 


ABG Base Excess     (-2.0-3.0)  mmol/L


 


ABG Hemoglobin     (11.7-17.4)  g/dL


 


ABG Carboxyhemoglobin     (0.5-1.5)  %


 


POC ABG HHb (Measured)     (0-5)  %


 


ABG Methemoglobin     (0.0-3.0)  %


 


ABG O2 Capacity     (16-24)  mL/dl


 


ABG Potassium     (3.6-5.2)  mmol/L


 


Hgb O2 Saturation     (95.0-98.0)  %


 


Sodium     (132-148)  mmol/L


 


Glucose     ()  mg/dl


 


Lactate     (0.7-2.1)  mmol/L


 


FiO2     %


 


PEEP     


 


Pressure Support     


 


Potassium     (3.6-5.0)  mmol/L


 


Chloride     ()  mmol/L


 


Carbon Dioxide     (21-33)  mmol/L


 


Anion Gap     (10-20)  


 


BUN     (7-21)  mg/dL


 


Creatinine     (0.5-1.4)  mg/dL


 


Est GFR (African Amer)     


 


Est GFR (Non-Af Amer)     


 


POC Glucose (mg/dL)  309 H  305 H   ()  mg/dL


 


Random Glucose     ()  mg/dL


 


Calcium     (8.4-10.5)  mg/dL


 


Phosphorus     (2.5-4.5)  mg/dL


 


Magnesium     (1.7-2.2)  mg/dL


 


Total Bilirubin     (0.2-1.3)  mg/dL


 


GGT     (8-78)  U/L


 


AST     (15-59)  U/L


 


ALT     (7-56)  U/L


 


Alkaline Phosphatase     ()  U/L


 


Total Protein     (5.8-8.3)  g/dL


 


Albumin     (3.0-4.8)  g/dL


 


Globulin     gm/dL


 


Albumin/Globulin Ratio     (1.1-1.8)  


 


Amylase     ()  U/L


 


Lipase     ()  U/L


 


Arterial Blood Potassium     (3.6-5.2)  mmol/L


 


Cryptococcus Ab     


 


CMV Specimen Source     


 


CMV IgG Ab     U/mL


 


CMV IgM Ab     AU/mL


 


CMV DNA Quant PCR     IU/mL


 


CMV Qnt PCR log IU/mL     Log IU/mL


 


P. carinii Source     


 


P.carinii Concentration     (())  


 


Pneumocyst carinii DFA     


 


Beta-(1,3)-D-Glucan      (())  pg/mL


 


B-(1,3)-D-Glucan Intrp    See note  


 


WB Flow Cytometry     














  05/19/17 05/19/17 05/19/17 Range/Units





  16:12 16:12 14:10 


 


WBC     (4.5-11.0)  10^3/ul


 


RBC     (3.5-6.1)  10^6/uL


 


Hgb     (14.0-18.0)  gm/dL


 


Hct     (42.0-52.0)  %


 


MCV     (80.0-105.0)  fL


 


MCH     (25.0-35.0)  pg


 


MCHC     (31.0-37.0)  g/dl


 


RDW     (11.5-14.5)  %


 


Plt Count     (120.0-450.0)  10^3/uL


 


MPV     (7.0-11.0)  fl


 


Gran %     (50.0-68.0)  %


 


Lymph % (Auto)     (22.0-35.0)  %


 


Mono % (Auto)     (1.0-6.0)  %


 


Eos % (Auto)     (1.5-5.0)  %


 


Baso % (Auto)     (0.0-3.0)  %


 


Gran #     (1.4-6.5)  


 


Lymph #     (1.2-3.4)  


 


Mono #     (0.1-0.6)  


 


Eos #     (0.0-0.7)  


 


Baso #     (0.0-2.0)  K/mm3


 


PT     (9.9-11.8)  Seconds


 


INR     (0.93-1.08)  


 


pCO2     (35-45)  mm/Hg


 


pO2     ()  mm/Hg


 


HCO3     (21-28)  mmol/L


 


ABG pH     (7.35-7.45)  


 


ABG Total CO2     (22-28)  mmol.L


 


ABG O2 Saturation     (95-98)  %


 


ABG O2 Content     (15-23)  ML/dl


 


ABG Base Excess     (-2.0-3.0)  mmol/L


 


ABG Hemoglobin     (11.7-17.4)  g/dL


 


ABG Carboxyhemoglobin     (0.5-1.5)  %


 


POC ABG HHb (Measured)     (0-5)  %


 


ABG Methemoglobin     (0.0-3.0)  %


 


ABG O2 Capacity     (16-24)  mL/dl


 


ABG Potassium     (3.6-5.2)  mmol/L


 


Hgb O2 Saturation     (95.0-98.0)  %


 


Sodium     (132-148)  mmol/L


 


Glucose     ()  mg/dl


 


Lactate     (0.7-2.1)  mmol/L


 


FiO2     %


 


PEEP     


 


Pressure Support     


 


Potassium     (3.6-5.0)  mmol/L


 


Chloride     ()  mmol/L


 


Carbon Dioxide     (21-33)  mmol/L


 


Anion Gap     (10-20)  


 


BUN     (7-21)  mg/dL


 


Creatinine     (0.5-1.4)  mg/dL


 


Est GFR (African Amer)     


 


Est GFR (Non-Af Amer)     


 


POC Glucose (mg/dL)    276 H  ()  mg/dL


 


Random Glucose     ()  mg/dL


 


Calcium     (8.4-10.5)  mg/dL


 


Phosphorus     (2.5-4.5)  mg/dL


 


Magnesium     (1.7-2.2)  mg/dL


 


Total Bilirubin     (0.2-1.3)  mg/dL


 


GGT     (8-78)  U/L


 


AST     (15-59)  U/L


 


ALT     (7-56)  U/L


 


Alkaline Phosphatase     ()  U/L


 


Total Protein     (5.8-8.3)  g/dL


 


Albumin     (3.0-4.8)  g/dL


 


Globulin     gm/dL


 


Albumin/Globulin Ratio     (1.1-1.8)  


 


Amylase     ()  U/L


 


Lipase     ()  U/L


 


Arterial Blood Potassium     (3.6-5.2)  mmol/L


 


Cryptococcus Ab     


 


CMV Specimen Source     


 


CMV IgG Ab  >10.00 H    U/mL


 


CMV IgM Ab  <30.00    AU/mL


 


CMV DNA Quant PCR     IU/mL


 


CMV Qnt PCR log IU/mL     Log IU/mL


 


P. carinii Source     


 


P.carinii Concentration     (())  


 


Pneumocyst carinii DFA     


 


Beta-(1,3)-D-Glucan      (())  pg/mL


 


B-(1,3)-D-Glucan Intrp   See note   


 


WB Flow Cytometry     














  05/19/17 05/19/17 05/19/17 Range/Units





  10:45 10:45 10:00 


 


WBC     (4.5-11.0)  10^3/ul


 


RBC     (3.5-6.1)  10^6/uL


 


Hgb     (14.0-18.0)  gm/dL


 


Hct     (42.0-52.0)  %


 


MCV     (80.0-105.0)  fL


 


MCH     (25.0-35.0)  pg


 


MCHC     (31.0-37.0)  g/dl


 


RDW     (11.5-14.5)  %


 


Plt Count     (120.0-450.0)  10^3/uL


 


MPV     (7.0-11.0)  fl


 


Gran %     (50.0-68.0)  %


 


Lymph % (Auto)     (22.0-35.0)  %


 


Mono % (Auto)     (1.0-6.0)  %


 


Eos % (Auto)     (1.5-5.0)  %


 


Baso % (Auto)     (0.0-3.0)  %


 


Gran #     (1.4-6.5)  


 


Lymph #     (1.2-3.4)  


 


Mono #     (0.1-0.6)  


 


Eos #     (0.0-0.7)  


 


Baso #     (0.0-2.0)  K/mm3


 


PT     (9.9-11.8)  Seconds


 


INR     (0.93-1.08)  


 


pCO2     (35-45)  mm/Hg


 


pO2     ()  mm/Hg


 


HCO3     (21-28)  mmol/L


 


ABG pH     (7.35-7.45)  


 


ABG Total CO2     (22-28)  mmol.L


 


ABG O2 Saturation     (95-98)  %


 


ABG O2 Content     (15-23)  ML/dl


 


ABG Base Excess     (-2.0-3.0)  mmol/L


 


ABG Hemoglobin     (11.7-17.4)  g/dL


 


ABG Carboxyhemoglobin     (0.5-1.5)  %


 


POC ABG HHb (Measured)     (0-5)  %


 


ABG Methemoglobin     (0.0-3.0)  %


 


ABG O2 Capacity     (16-24)  mL/dl


 


ABG Potassium    4.6  (3.6-5.2)  mmol/L


 


Hgb O2 Saturation     (95.0-98.0)  %


 


Sodium    131.0 L  (132-148)  mmol/L


 


Glucose    258 H  ()  mg/dl


 


Lactate    0.9  (0.7-2.1)  mmol/L


 


FiO2    80  %


 


PEEP    10  


 


Pressure Support    12  


 


Potassium     (3.6-5.0)  mmol/L


 


Chloride     ()  mmol/L


 


Carbon Dioxide     (21-33)  mmol/L


 


Anion Gap     (10-20)  


 


BUN     (7-21)  mg/dL


 


Creatinine     (0.5-1.4)  mg/dL


 


Est GFR (African Amer)     


 


Est GFR (Non-Af Amer)     


 


POC Glucose (mg/dL)     ()  mg/dL


 


Random Glucose     ()  mg/dL


 


Calcium     (8.4-10.5)  mg/dL


 


Phosphorus     (2.5-4.5)  mg/dL


 


Magnesium     (1.7-2.2)  mg/dL


 


Total Bilirubin     (0.2-1.3)  mg/dL


 


GGT     (8-78)  U/L


 


AST     (15-59)  U/L


 


ALT     (7-56)  U/L


 


Alkaline Phosphatase     ()  U/L


 


Total Protein     (5.8-8.3)  g/dL


 


Albumin     (3.0-4.8)  g/dL


 


Globulin     gm/dL


 


Albumin/Globulin Ratio     (1.1-1.8)  


 


Amylase     ()  U/L


 


Lipase     ()  U/L


 


Arterial Blood Potassium    4.6  (3.6-5.2)  mmol/L


 


Cryptococcus Ab  <1:2    


 


CMV Specimen Source   Plasma   


 


CMV IgG Ab  >10.00 H    U/mL


 


CMV IgM Ab  <30.00    AU/mL


 


CMV DNA Quant PCR   <200   IU/mL


 


CMV Qnt PCR log IU/mL   <2.30   Log IU/mL


 


P. carinii Source     


 


P.carinii Concentration     (())  


 


Pneumocyst carinii DFA     


 


Beta-(1,3)-D-Glucan     (())  pg/mL


 


B-(1,3)-D-Glucan Intrp     


 


WB Flow Cytometry     








 Laboratory Results - last 24 hr











  05/19/17 05/19/17 05/19/17





  10:00 10:45 10:45


 


WBC   


 


RBC   


 


Hgb   


 


Hct   


 


MCV   


 


MCH   


 


MCHC   


 


RDW   


 


Plt Count   


 


MPV   


 


Gran %   


 


Lymph % (Auto)   


 


Mono % (Auto)   


 


Eos % (Auto)   


 


Baso % (Auto)   


 


Gran #   


 


Lymph #   


 


Mono #   


 


Eos #   


 


Baso #   


 


PT   


 


INR   


 


pCO2   


 


pO2   


 


HCO3   


 


ABG pH   


 


ABG Total CO2   


 


ABG O2 Saturation   


 


ABG O2 Content   


 


ABG Base Excess   


 


ABG Hemoglobin   


 


ABG Carboxyhemoglobin   


 


POC ABG HHb (Measured)   


 


ABG Methemoglobin   


 


ABG O2 Capacity   


 


ABG Potassium  4.6  


 


Hgb O2 Saturation   


 


Sodium  131.0 L  


 


Glucose  258 H  


 


Lactate  0.9  


 


FiO2  80  


 


PEEP  10  


 


Pressure Support  12  


 


Potassium   


 


Chloride   


 


Carbon Dioxide   


 


Anion Gap   


 


BUN   


 


Creatinine   


 


Est GFR ( Amer)   


 


Est GFR (Non-Af Amer)   


 


POC Glucose (mg/dL)   


 


Random Glucose   


 


Calcium   


 


Phosphorus   


 


Magnesium   


 


Total Bilirubin   


 


GGT   


 


AST   


 


ALT   


 


Alkaline Phosphatase   


 


Total Protein   


 


Albumin   


 


Globulin   


 


Albumin/Globulin Ratio   


 


Amylase   


 


Lipase   


 


Arterial Blood Potassium  4.6  


 


Cryptococcus Ab    <1:2


 


CMV Specimen Source   Plasma 


 


CMV IgG Ab    >10.00 H


 


CMV IgM Ab    <30.00


 


CMV DNA Quant PCR   <200 


 


CMV Qnt PCR log IU/mL   <2.30 


 


P. carinii Source   


 


P.carinii Concentration   


 


Pneumocyst carinii DFA   


 


Beta-(1,3)-D-Glucan   


 


B-(1,3)-D-Glucan Intrp   


 


WB Flow Cytometry   














  05/19/17 05/19/17 05/19/17





  14:10 16:12 16:12


 


WBC   


 


RBC   


 


Hgb   


 


Hct   


 


MCV   


 


MCH   


 


MCHC   


 


RDW   


 


Plt Count   


 


MPV   


 


Gran %   


 


Lymph % (Auto)   


 


Mono % (Auto)   


 


Eos % (Auto)   


 


Baso % (Auto)   


 


Gran #   


 


Lymph #   


 


Mono #   


 


Eos #   


 


Baso #   


 


PT   


 


INR   


 


pCO2   


 


pO2   


 


HCO3   


 


ABG pH   


 


ABG Total CO2   


 


ABG O2 Saturation   


 


ABG O2 Content   


 


ABG Base Excess   


 


ABG Hemoglobin   


 


ABG Carboxyhemoglobin   


 


POC ABG HHb (Measured)   


 


ABG Methemoglobin   


 


ABG O2 Capacity   


 


ABG Potassium   


 


Hgb O2 Saturation   


 


Sodium   


 


Glucose   


 


Lactate   


 


FiO2   


 


PEEP   


 


Pressure Support   


 


Potassium   


 


Chloride   


 


Carbon Dioxide   


 


Anion Gap   


 


BUN   


 


Creatinine   


 


Est GFR ( Amer)   


 


Est GFR (Non-Af Amer)   


 


POC Glucose (mg/dL)  276 H  


 


Random Glucose   


 


Calcium   


 


Phosphorus   


 


Magnesium   


 


Total Bilirubin   


 


GGT   


 


AST   


 


ALT   


 


Alkaline Phosphatase   


 


Total Protein   


 


Albumin   


 


Globulin   


 


Albumin/Globulin Ratio   


 


Amylase   


 


Lipase   


 


Arterial Blood Potassium   


 


Cryptococcus Ab   


 


CMV Specimen Source   


 


CMV IgG Ab    >10.00 H


 


CMV IgM Ab    <30.00


 


CMV DNA Quant PCR   


 


CMV Qnt PCR log IU/mL   


 


P. carinii Source   


 


P.carinii Concentration   


 


Pneumocyst carinii DFA   


 


Beta-(1,3)-D-Glucan    


 


B-(1,3)-D-Glucan Intrp   See note 


 


WB Flow Cytometry   














  05/19/17 05/20/17 05/20/17





  16:12 02:40 11:16


 


WBC   


 


RBC   


 


Hgb   


 


Hct   


 


MCV   


 


MCH   


 


MCHC   


 


RDW   


 


Plt Count   


 


MPV   


 


Gran %   


 


Lymph % (Auto)   


 


Mono % (Auto)   


 


Eos % (Auto)   


 


Baso % (Auto)   


 


Gran #   


 


Lymph #   


 


Mono #   


 


Eos #   


 


Baso #   


 


PT   


 


INR   


 


pCO2   


 


pO2   


 


HCO3   


 


ABG pH   


 


ABG Total CO2   


 


ABG O2 Saturation   


 


ABG O2 Content   


 


ABG Base Excess   


 


ABG Hemoglobin   


 


ABG Carboxyhemoglobin   


 


POC ABG HHb (Measured)   


 


ABG Methemoglobin   


 


ABG O2 Capacity   


 


ABG Potassium   


 


Hgb O2 Saturation   


 


Sodium   


 


Glucose   


 


Lactate   


 


FiO2   


 


PEEP   


 


Pressure Support   


 


Potassium   


 


Chloride   


 


Carbon Dioxide   


 


Anion Gap   


 


BUN   


 


Creatinine   


 


Est GFR ( Amer)   


 


Est GFR (Non-Af Amer)   


 


POC Glucose (mg/dL)   305 H  309 H


 


Random Glucose   


 


Calcium   


 


Phosphorus   


 


Magnesium   


 


Total Bilirubin   


 


GGT   


 


AST   


 


ALT   


 


Alkaline Phosphatase   


 


Total Protein   


 


Albumin   


 


Globulin   


 


Albumin/Globulin Ratio   


 


Amylase   


 


Lipase   


 


Arterial Blood Potassium   


 


Cryptococcus Ab   


 


CMV Specimen Source   


 


CMV IgG Ab   


 


CMV IgM Ab   


 


CMV DNA Quant PCR   


 


CMV Qnt PCR log IU/mL   


 


P. carinii Source   


 


P.carinii Concentration   


 


Pneumocyst carinii DFA   


 


Beta-(1,3)-D-Glucan    


 


B-(1,3)-D-Glucan Intrp  See note  


 


WB Flow Cytometry   














  05/20/17 05/20/17 05/20/17





  15:30 16:38 22:37


 


WBC   


 


RBC   


 


Hgb   


 


Hct   


 


MCV   


 


MCH   


 


MCHC   


 


RDW   


 


Plt Count   


 


MPV   


 


Gran %   


 


Lymph % (Auto)   


 


Mono % (Auto)   


 


Eos % (Auto)   


 


Baso % (Auto)   


 


Gran #   


 


Lymph #   


 


Mono #   


 


Eos #   


 


Baso #   


 


PT   


 


INR   


 


pCO2   


 


pO2   


 


HCO3   


 


ABG pH   


 


ABG Total CO2   


 


ABG O2 Saturation   


 


ABG O2 Content   


 


ABG Base Excess   


 


ABG Hemoglobin   


 


ABG Carboxyhemoglobin   


 


POC ABG HHb (Measured)   


 


ABG Methemoglobin   


 


ABG O2 Capacity   


 


ABG Potassium   


 


Hgb O2 Saturation   


 


Sodium   


 


Glucose   


 


Lactate   


 


FiO2   


 


PEEP   


 


Pressure Support   


 


Potassium   


 


Chloride   


 


Carbon Dioxide   


 


Anion Gap   


 


BUN   


 


Creatinine   


 


Est GFR ( Amer)   


 


Est GFR (Non-Af Amer)   


 


POC Glucose (mg/dL)   255 H  323 H


 


Random Glucose   


 


Calcium   


 


Phosphorus   


 


Magnesium   


 


Total Bilirubin   


 


GGT   


 


AST   


 


ALT   


 


Alkaline Phosphatase   


 


Total Protein   


 


Albumin   


 


Globulin   


 


Albumin/Globulin Ratio   


 


Amylase   


 


Lipase   


 


Arterial Blood Potassium   


 


Cryptococcus Ab   


 


CMV Specimen Source   


 


CMV IgG Ab   


 


CMV IgM Ab   


 


CMV DNA Quant PCR   


 


CMV Qnt PCR log IU/mL   


 


P. carinii Source  Sputum  


 


P.carinii Concentration    


 


Pneumocyst carinii DFA  See note  


 


Beta-(1,3)-D-Glucan   


 


B-(1,3)-D-Glucan Intrp   


 


WB Flow Cytometry   














  05/21/17 05/21/17 05/21/17





  03:07 06:14 10:22


 


WBC   


 


RBC   


 


Hgb   


 


Hct   


 


MCV   


 


MCH   


 


MCHC   


 


RDW   


 


Plt Count   


 


MPV   


 


Gran %   


 


Lymph % (Auto)   


 


Mono % (Auto)   


 


Eos % (Auto)   


 


Baso % (Auto)   


 


Gran #   


 


Lymph #   


 


Mono #   


 


Eos #   


 


Baso #   


 


PT   


 


INR   


 


pCO2   


 


pO2   


 


HCO3   


 


ABG pH   


 


ABG Total CO2   


 


ABG O2 Saturation   


 


ABG O2 Content   


 


ABG Base Excess   


 


ABG Hemoglobin   


 


ABG Carboxyhemoglobin   


 


POC ABG HHb (Measured)   


 


ABG Methemoglobin   


 


ABG O2 Capacity   


 


ABG Potassium   


 


Hgb O2 Saturation   


 


Sodium   


 


Glucose   


 


Lactate   


 


FiO2   


 


PEEP   


 


Pressure Support   


 


Potassium   


 


Chloride   


 


Carbon Dioxide   


 


Anion Gap   


 


BUN   


 


Creatinine   


 


Est GFR ( Amer)   


 


Est GFR (Non-Af Amer)   


 


POC Glucose (mg/dL)  325 H  333 H  280 H


 


Random Glucose   


 


Calcium   


 


Phosphorus   


 


Magnesium   


 


Total Bilirubin   


 


GGT   


 


AST   


 


ALT   


 


Alkaline Phosphatase   


 


Total Protein   


 


Albumin   


 


Globulin   


 


Albumin/Globulin Ratio   


 


Amylase   


 


Lipase   


 


Arterial Blood Potassium   


 


Cryptococcus Ab   


 


CMV Specimen Source   


 


CMV IgG Ab   


 


CMV IgM Ab   


 


CMV DNA Quant PCR   


 


CMV Qnt PCR log IU/mL   


 


P. carinii Source   


 


P.carinii Concentration   


 


Pneumocyst carinii DFA   


 


Beta-(1,3)-D-Glucan   


 


B-(1,3)-D-Glucan Intrp   


 


WB Flow Cytometry   














  05/21/17 05/21/17 05/21/17





  15:57 19:09 20:59


 


WBC   


 


RBC   


 


Hgb   


 


Hct   


 


MCV   


 


MCH   


 


MCHC   


 


RDW   


 


Plt Count   


 


MPV   


 


Gran %   


 


Lymph % (Auto)   


 


Mono % (Auto)   


 


Eos % (Auto)   


 


Baso % (Auto)   


 


Gran #   


 


Lymph #   


 


Mono #   


 


Eos #   


 


Baso #   


 


PT   


 


INR   


 


pCO2   


 


pO2   


 


HCO3   


 


ABG pH   


 


ABG Total CO2   


 


ABG O2 Saturation   


 


ABG O2 Content   


 


ABG Base Excess   


 


ABG Hemoglobin   


 


ABG Carboxyhemoglobin   


 


POC ABG HHb (Measured)   


 


ABG Methemoglobin   


 


ABG O2 Capacity   


 


ABG Potassium   


 


Hgb O2 Saturation   


 


Sodium   


 


Glucose   


 


Lactate   


 


FiO2   


 


PEEP   


 


Pressure Support   


 


Potassium   


 


Chloride   


 


Carbon Dioxide   


 


Anion Gap   


 


BUN   


 


Creatinine   


 


Est GFR ( Amer)   


 


Est GFR (Non-Af Amer)   


 


POC Glucose (mg/dL)  314 H  272 H  227 H


 


Random Glucose   


 


Calcium   


 


Phosphorus   


 


Magnesium   


 


Total Bilirubin   


 


GGT   


 


AST   


 


ALT   


 


Alkaline Phosphatase   


 


Total Protein   


 


Albumin   


 


Globulin   


 


Albumin/Globulin Ratio   


 


Amylase   


 


Lipase   


 


Arterial Blood Potassium   


 


Cryptococcus Ab   


 


CMV Specimen Source   


 


CMV IgG Ab   


 


CMV IgM Ab   


 


CMV DNA Quant PCR   


 


CMV Qnt PCR log IU/mL   


 


P. carinii Source   


 


P.carinii Concentration   


 


Pneumocyst carinii DFA   


 


Beta-(1,3)-D-Glucan   


 


B-(1,3)-D-Glucan Intrp   


 


WB Flow Cytometry   














  05/22/17 05/22/17 05/22/17





  00:40 04:04 05:50


 


WBC   


 


RBC   


 


Hgb   


 


Hct   


 


MCV   


 


MCH   


 


MCHC   


 


RDW   


 


Plt Count   


 


MPV   


 


Gran %   


 


Lymph % (Auto)   


 


Mono % (Auto)   


 


Eos % (Auto)   


 


Baso % (Auto)   


 


Gran #   


 


Lymph #   


 


Mono #   


 


Eos #   


 


Baso #   


 


PT   


 


INR   


 


pCO2    50 H


 


pO2    123.0 H


 


HCO3    23.5


 


ABG pH    7.28 L


 


ABG Total CO2    25.0


 


ABG O2 Saturation    99.2 H


 


ABG O2 Content    11.4 L


 


ABG Base Excess    -3.3 L


 


ABG Hemoglobin    8.6 L


 


ABG Carboxyhemoglobin    3.4 H


 


POC ABG HHb (Measured)    0.7


 


ABG Methemoglobin    3.8 H


 


ABG O2 Capacity    11.5 L


 


ABG Potassium   


 


Hgb O2 Saturation    92.0 L


 


Sodium   


 


Glucose   


 


Lactate   


 


FiO2    40.0


 


PEEP   


 


Pressure Support   


 


Potassium   


 


Chloride   


 


Carbon Dioxide   


 


Anion Gap   


 


BUN   


 


Creatinine   


 


Est GFR ( Amer)   


 


Est GFR (Non-Af Amer)   


 


POC Glucose (mg/dL)  224 H  183 H 


 


Random Glucose   


 


Calcium   


 


Phosphorus   


 


Magnesium   


 


Total Bilirubin   


 


GGT   


 


AST   


 


ALT   


 


Alkaline Phosphatase   


 


Total Protein   


 


Albumin   


 


Globulin   


 


Albumin/Globulin Ratio   


 


Amylase   


 


Lipase   


 


Arterial Blood Potassium   


 


Cryptococcus Ab   


 


CMV Specimen Source   


 


CMV IgG Ab   


 


CMV IgM Ab   


 


CMV DNA Quant PCR   


 


CMV Qnt PCR log IU/mL   


 


P. carinii Source   


 


P.carinii Concentration   


 


Pneumocyst carinii DFA   


 


Beta-(1,3)-D-Glucan   


 


B-(1,3)-D-Glucan Intrp   


 


WB Flow Cytometry   














  05/22/17 05/22/17 05/22/17





  06:40 06:40 06:40


 


WBC   29.6 H* D 


 


RBC   2.63 L 


 


Hgb   8.1 L 


 


Hct   23.5 L 


 


MCV   89.4 


 


MCH   30.8 


 


MCHC   34.5 


 


RDW   20.7 H 


 


Plt Count   103 L 


 


MPV   9.8 


 


Gran %   74.1 H 


 


Lymph % (Auto)   12.3 L 


 


Mono % (Auto)   11.2 H 


 


Eos % (Auto)   1.8 


 


Baso % (Auto)   0.6 


 


Gran #   21.94 H 


 


Lymph #   3.6 H 


 


Mono #   3.3 H 


 


Eos #   0.5 


 


Baso #   0.18 


 


PT   


 


INR   


 


pCO2   


 


pO2   


 


HCO3   


 


ABG pH   


 


ABG Total CO2   


 


ABG O2 Saturation   


 


ABG O2 Content   


 


ABG Base Excess   


 


ABG Hemoglobin   


 


ABG Carboxyhemoglobin   


 


POC ABG HHb (Measured)   


 


ABG Methemoglobin   


 


ABG O2 Capacity   


 


ABG Potassium   


 


Hgb O2 Saturation   


 


Sodium    134


 


Glucose   


 


Lactate   


 


FiO2   


 


PEEP   


 


Pressure Support   


 


Potassium    4.0


 


Chloride    96 L


 


Carbon Dioxide    21


 


Anion Gap    21 H


 


BUN    58 H


 


Creatinine    6.1 H


 


Est GFR ( Amer)    11


 


Est GFR (Non-Af Amer)    9


 


POC Glucose (mg/dL)   


 


Random Glucose    159 H


 


Calcium    10.8 H


 


Phosphorus   


 


Magnesium   


 


Total Bilirubin    3.0 H


 


GGT   


 


AST    295 H


 


ALT    403 H


 


Alkaline Phosphatase    129


 


Total Protein    7.8


 


Albumin    3.3


 


Globulin    4.5


 


Albumin/Globulin Ratio    0.7 L


 


Amylase   


 


Lipase   


 


Arterial Blood Potassium   


 


Cryptococcus Ab   


 


CMV Specimen Source   


 


CMV IgG Ab   


 


CMV IgM Ab   


 


CMV DNA Quant PCR   


 


CMV Qnt PCR log IU/mL   


 


P. carinii Source   


 


P.carinii Concentration   


 


Pneumocyst carinii DFA   


 


Beta-(1,3)-D-Glucan   


 


B-(1,3)-D-Glucan Intrp   


 


WB Flow Cytometry  Reference test  














  05/22/17 05/22/17 05/22/17





  06:40 10:00 11:11


 


WBC   


 


RBC   


 


Hgb   


 


Hct   


 


MCV   


 


MCH   


 


MCHC   


 


RDW   


 


Plt Count   


 


MPV   


 


Gran %   


 


Lymph % (Auto)   


 


Mono % (Auto)   


 


Eos % (Auto)   


 


Baso % (Auto)   


 


Gran #   


 


Lymph #   


 


Mono #   


 


Eos #   


 


Baso #   


 


PT   12.6 H 


 


INR   1.17 H 


 


pCO2   


 


pO2   


 


HCO3   


 


ABG pH   


 


ABG Total CO2   


 


ABG O2 Saturation   


 


ABG O2 Content   


 


ABG Base Excess   


 


ABG Hemoglobin   


 


ABG Carboxyhemoglobin   


 


POC ABG HHb (Measured)   


 


ABG Methemoglobin   


 


ABG O2 Capacity   


 


ABG Potassium   


 


Hgb O2 Saturation   


 


Sodium   


 


Glucose   


 


Lactate   


 


FiO2   


 


PEEP   


 


Pressure Support   


 


Potassium   


 


Chloride   


 


Carbon Dioxide   


 


Anion Gap   


 


BUN   


 


Creatinine   


 


Est GFR ( Amer)   


 


Est GFR (Non-Af Amer)   


 


POC Glucose (mg/dL)    181 H


 


Random Glucose   


 


Calcium   


 


Phosphorus  8.1 H  


 


Magnesium  2.0  


 


Total Bilirubin   


 


GGT  242 H  


 


AST   


 


ALT   


 


Alkaline Phosphatase   


 


Total Protein   


 


Albumin   


 


Globulin   


 


Albumin/Globulin Ratio   


 


Amylase  454 H  


 


Lipase  5748 H  


 


Arterial Blood Potassium   


 


Cryptococcus Ab   


 


CMV Specimen Source   


 


CMV IgG Ab   


 


CMV IgM Ab   


 


CMV DNA Quant PCR   


 


CMV Qnt PCR log IU/mL   


 


P. carinii Source   


 


P.carinii Concentration   


 


Pneumocyst carinii DFA   


 


Beta-(1,3)-D-Glucan   


 


B-(1,3)-D-Glucan Intrp   


 


WB Flow Cytometry   











Fingerstick Blood Sugar Results: 181


Results Reviewed to Date: Yes





Review of Systems





- Review of Systems


Systems not reviewed;Unavailable: Intubated





Critical Care Progress Note





- Ventilator Checklist


Head of Bed 30 Degrees: Yes


Daily Sedation Vacation: Yes


Daily Assessment of Readiness to Wean: Yes


Daily Spontaneous Breathing Trial: Yes


PUD Prophalyxis: Yes


DVT Prophylaxis: Yes


Oral Care with Chlorhexidine Gluconate {CHG}: Yes





- Vent Settings


MODE:: PRVC


TIDAL VOLUME:: 400


RESP RATE:: 28


FIO2:: 40


PEEP:: 10





- Extremities/Vascular


Does the Patient have a Central Venous Catheter?: No





- Prophylaxis GI


Prophylaxis GI: PPI





- Prophylaxis DVT


Prophylaxis DVT: Heparin SQ





- Nutrition


Nutrition: 


 Nutrition











 Category Date Time Status


 


 NPO Diet [DIET] Diets  05/17/17 Breakfast Ordered














Assessment/Plan





- Assessment and Plan (Free Text)


Assessment: 





61 year old male with a PMH of HTN, IDDM,, ESRD on dialysis, and HLD who 

presented for nausea and vomiting, progressed into respiratory failure needing 

intubation, found to have hepatic failure of unknown etiology, then found to 

have MRSA pneumonia.


Plan: 





Neurological 


Off sedation neurologically improved, follows commands, grossly nonfocal


Frequent sedation vacations, on fentanyl and precedex gtts





Cardiovascular


Hemodynamically stable, maintain normotension, has PRN hydralazine in place but 

has not needed it


Cardio following and recs appreciated





Pulmonary 


Intubated on PRVC 350/28/10/40%, maintain O2sat >90%, continue CPAP trials to 

assess for possible extubation


MRSA pnemonia on linezolid day 4, leukocytosis improving, less secretions 

appreciated and exam improving, will increase to q4h OSEAS to promote 

expectoration





Renal/ Fluids / Electrolytes 


ESRD on HD scheduled for today


Nephro following, recs appreciated


Suspicion of myalgias, will get CPK level


Repleting electrolytes as needed





GI 


NPO, intubated


GI following, recs appreciated


Acute hepatic failure improving with AST/ALT downtrend as well as INR and 

bilirubin levels


Found to have pancreatitis with elevated lipase, surgery following, known 

cholelithiasis but unremarkable GB on recent CT, will repeat GB US at later time


Still has some diarrhea but on lactulose for hyperammonemia, on vancomycin PO D5


GI ppx





Infectious disease


ID following, recs apprecaited


On xifaxan D7 and meropenem D7 broad spectrum, blood cultures have been negative


Sputum Cx showed MRSA, on linezolid


Afebrile


Leukocytosis continues to downtrend





Hematology/Oncology


Hgb stable, chronic anemia with known kidney disease, s/p total of 3U PRBCs





Endocrine 


Maintain euglycemia, on levemir and RISS with accuchecks





Prophylaxis


PPI/SCDs





Patient was seen and examined and case was discussed at length with attending 

physician.





- Date & Time


Date: 05/22/17


Time: 10:30





<Jaylan PAZ,Matt H - Last Filed: 05/22/17 16:06>





CCU Objective





- Vital Signs / Intake & Output


Intake and Output (Last 8hrs): 


 Intake & Output











 05/22/17 05/22/17 05/22/17





 06:59 14:59 22:59


 


Intake Total 1064  


 


Output Total 500  2000


 


Balance 564  -2000


 


Weight 267 lb  


 


Intake:   


 


  IV 1064  


 


      


 


    rfa 264  


 


  Oral 0  


 


Output:   


 


  Urine 0  


 


    Straight 0  


 


  Stool 500  


 


  Other   2000














- Medications


Active Medications: 


Active Medications











Generic Name Dose Route Start Last Admin





  Trade Name Freq  PRN Reason Stop Dose Admin


 


Albuterol/Ipratropium  3 ml  05/16/17 00:33  





  Duoneb 3 Mg/0.5 Mg (3 Ml) Ud  IH   





  Q2H PRN   





  Shortness of Breath   


 


Albuterol/Ipratropium  3 ml  05/22/17 19:30  





  Duoneb 3 Mg/0.5 Mg (3 Ml) Ud  IH   





  [id number] OSEAS   


 


Heparin Sodium (Porcine)  5,000 units  05/16/17 10:00  05/22/17 09:52





  Heparin  SC   5,000 units





  Q12 OSEAS   Administration





  Protocol   


 


Hydralazine HCl  10 mg  05/16/17 02:36  05/17/17 02:05





  Apresoline  IVP   10 mg





  Q6 PRN   Administration





  SBP >180   


 


Meropenem 500 mg/ Sodium  100 mls @ 100 mls/hr  05/16/17 06:22  05/22/17 10:24





  Chloride  IVPB  05/23/17 06:23  100 mls/hr





  Q12 OSEAS   Administration


 


Metronidazole  500 mg in 100 mls @ 100 mls/hr  05/19/17 14:00  05/22/17 05:17





  Flagyl  IVPB   100 mls/hr





  Q8 OSEAS   Administration





  Protocol   


 


Linezolid  600 mg in 300 mls @ 200 mls/hr  05/19/17 22:00  05/22/17 09:49





  Zyvox 600mg/300ml D5w  IVPB  05/26/17 22:01  200 mls/hr





  Q12 OSEAS   Administration





  Protocol   


 


Fentanyl Citrate  1,000 mcg in 100 mls @ 10 mls/hr  05/22/17 09:14  05/22/17 09:

36





  Fentanyl Citrate/Sodium Chloride 1 Mg/100 Ml  IV   100 mcg/hr





  .Q10H PRN   10 mls/hr





  TITRATE PER MD ORDER   Administration





  Protocol   





  100 MCG/HR   


 


Dexmedetomidine HCl  400 mcg in 100 mls @ 12.111 mls/hr  05/22/17 09:15  05/22/ 17 09:48





  Precedex 4 Mcg/Ml (100 Ml)  IV   0.4 mcg/kg/hr





  .Q8H16M PRN   12.111 mls/hr





  Agitation   Administration





  Protocol   





  0.4 MCG/KG/HR   


 


Insulin Detemir  20 unit  05/18/17 12:10  05/21/17 10:37





  Levemir  SC   20 unit





  DAILY OSEAS   Administration


 


Insulin Human Regular  0 units  05/22/17 01:12  05/22/17 04:25





  Humulin R Med  SC   Not Given





  Q4 OSEAS   


 


Lactulose  20 gm  05/16/17 10:30  05/22/17 02:18





  Enulose  PO   20 gm





  Q8H OSEAS   Administration


 


Ondansetron HCl  4 mg  05/15/17 22:41  





  Zofran Inj  IVP   





  Q6H PRN   





  Nausea/Vomiting   


 


Pantoprazole Sodium  40 mg  05/16/17 10:00  05/22/17 10:21





  Protonix Inj  IVP   40 mg





  DAILY OSEAS   Administration


 


Rifaximin  550 mg  05/16/17 10:30  05/22/17 09:50





  Xifaxan  PO   Not Given





  BID [facility]   





  Protocol   


 


Vancomycin HCl  500 mg  05/19/17 14:00  05/22/17 09:14





  Vancocin 25 Mg/Ml (Oral Use)  PO   500 mg





  QID OSEAS   Administration





  Protocol   














- Patient Studies


Lab Studies: 


 Microbiology Studies











 05/19/17 16:14 Gram Stain - Final





 Sputum Sputum Culture - Final





    Methicillin Resistant S Aureus








 Lab Studies











  05/22/17 05/22/17 05/22/17 Range/Units





  11:11 10:00 06:40 


 


WBC     (4.5-11.0)  10^3/ul


 


RBC     (3.5-6.1)  10^6/uL


 


Hgb     (14.0-18.0)  gm/dL


 


Hct     (42.0-52.0)  %


 


MCV     (80.0-105.0)  fL


 


MCH     (25.0-35.0)  pg


 


MCHC     (31.0-37.0)  g/dl


 


RDW     (11.5-14.5)  %


 


Plt Count     (120.0-450.0)  10^3/uL


 


MPV     (7.0-11.0)  fl


 


Gran %     (50.0-68.0)  %


 


Lymph % (Auto)     (22.0-35.0)  %


 


Mono % (Auto)     (1.0-6.0)  %


 


Eos % (Auto)     (1.5-5.0)  %


 


Baso % (Auto)     (0.0-3.0)  %


 


Gran #     (1.4-6.5)  


 


Lymph #     (1.2-3.4)  


 


Mono #     (0.1-0.6)  


 


Eos #     (0.0-0.7)  


 


Baso #     (0.0-2.0)  K/mm3


 


PT   12.6 H   (9.9-11.8)  Seconds


 


INR   1.17 H   (0.93-1.08)  


 


pCO2     (35-45)  mm/Hg


 


pO2     ()  mm/Hg


 


HCO3     (21-28)  mmol/L


 


ABG pH     (7.35-7.45)  


 


ABG Total CO2     (22-28)  mmol.L


 


ABG O2 Saturation     (95-98)  %


 


ABG O2 Content     (15-23)  ML/dl


 


ABG Base Excess     (-2.0-3.0)  mmol/L


 


ABG Hemoglobin     (11.7-17.4)  g/dL


 


ABG Carboxyhemoglobin     (0.5-1.5)  %


 


POC ABG HHb (Measured)     (0-5)  %


 


ABG Methemoglobin     (0.0-3.0)  %


 


ABG O2 Capacity     (16-24)  mL/dl


 


ABG Potassium     (3.6-5.2)  mmol/L


 


Hgb O2 Saturation     (95.0-98.0)  %


 


Sodium     (132-148)  mmol/L


 


Glucose     ()  mg/dl


 


Lactate     (0.7-2.1)  mmol/L


 


FiO2     %


 


PEEP     


 


Pressure Support     


 


Potassium     (3.6-5.0)  mmol/L


 


Chloride     ()  mmol/L


 


Carbon Dioxide     (21-33)  mmol/L


 


Anion Gap     (10-20)  


 


BUN     (7-21)  mg/dL


 


Creatinine     (0.5-1.4)  mg/dL


 


Est GFR (African Amer)     


 


Est GFR (Non-Af Amer)     


 


POC Glucose (mg/dL)  181 H    ()  mg/dL


 


Random Glucose     ()  mg/dL


 


Calcium     (8.4-10.5)  mg/dL


 


Phosphorus    8.1 H  (2.5-4.5)  mg/dL


 


Magnesium    2.0  (1.7-2.2)  mg/dL


 


Total Bilirubin     (0.2-1.3)  mg/dL


 


GGT    242 H  (8-78)  U/L


 


AST     (15-59)  U/L


 


ALT     (7-56)  U/L


 


Alkaline Phosphatase     ()  U/L


 


Total Protein     (5.8-8.3)  g/dL


 


Albumin     (3.0-4.8)  g/dL


 


Globulin     gm/dL


 


Albumin/Globulin Ratio     (1.1-1.8)  


 


Amylase    454 H  ()  U/L


 


Lipase    5748 H  ()  U/L


 


Arterial Blood Potassium     (3.6-5.2)  mmol/L


 


Cryptococcus Ab     


 


CMV Specimen Source     


 


CMV IgG Ab     U/mL


 


CMV IgM Ab     AU/mL


 


CMV DNA Quant PCR     IU/mL


 


CMV Qnt PCR log IU/mL     Log IU/mL


 


P. carinii Source     


 


P.carinii Concentration     (())  


 


Pneumocyst carinii DFA     


 


Ur Strep pneumoniae Ag     


 


Beta-(1,3)-D-Glucan     (())  pg/mL


 


B-(1,3)-D-Glucan Intrp     


 


WB Flow Cytometry     














  05/22/17 05/22/17 05/22/17 Range/Units





  06:40 06:40 06:40 


 


WBC   29.6 H* D   (4.5-11.0)  10^3/ul


 


RBC   2.63 L   (3.5-6.1)  10^6/uL


 


Hgb   8.1 L   (14.0-18.0)  gm/dL


 


Hct   23.5 L   (42.0-52.0)  %


 


MCV   89.4   (80.0-105.0)  fL


 


MCH   30.8   (25.0-35.0)  pg


 


MCHC   34.5   (31.0-37.0)  g/dl


 


RDW   20.7 H   (11.5-14.5)  %


 


Plt Count   103 L   (120.0-450.0)  10^3/uL


 


MPV   9.8   (7.0-11.0)  fl


 


Gran %   74.1 H   (50.0-68.0)  %


 


Lymph % (Auto)   12.3 L   (22.0-35.0)  %


 


Mono % (Auto)   11.2 H   (1.0-6.0)  %


 


Eos % (Auto)   1.8   (1.5-5.0)  %


 


Baso % (Auto)   0.6   (0.0-3.0)  %


 


Gran #   21.94 H   (1.4-6.5)  


 


Lymph #   3.6 H   (1.2-3.4)  


 


Mono #   3.3 H   (0.1-0.6)  


 


Eos #   0.5   (0.0-0.7)  


 


Baso #   0.18   (0.0-2.0)  K/mm3


 


PT     (9.9-11.8)  Seconds


 


INR     (0.93-1.08)  


 


pCO2     (35-45)  mm/Hg


 


pO2     ()  mm/Hg


 


HCO3     (21-28)  mmol/L


 


ABG pH     (7.35-7.45)  


 


ABG Total CO2     (22-28)  mmol.L


 


ABG O2 Saturation     (95-98)  %


 


ABG O2 Content     (15-23)  ML/dl


 


ABG Base Excess     (-2.0-3.0)  mmol/L


 


ABG Hemoglobin     (11.7-17.4)  g/dL


 


ABG Carboxyhemoglobin     (0.5-1.5)  %


 


POC ABG HHb (Measured)     (0-5)  %


 


ABG Methemoglobin     (0.0-3.0)  %


 


ABG O2 Capacity     (16-24)  mL/dl


 


ABG Potassium     (3.6-5.2)  mmol/L


 


Hgb O2 Saturation     (95.0-98.0)  %


 


Sodium  134    (132-148)  mmol/L


 


Glucose     ()  mg/dl


 


Lactate     (0.7-2.1)  mmol/L


 


FiO2     %


 


PEEP     


 


Pressure Support     


 


Potassium  4.0    (3.6-5.0)  mmol/L


 


Chloride  96 L    ()  mmol/L


 


Carbon Dioxide  21    (21-33)  mmol/L


 


Anion Gap  21 H    (10-20)  


 


BUN  58 H    (7-21)  mg/dL


 


Creatinine  6.1 H    (0.5-1.4)  mg/dL


 


Est GFR ( Amer)  11    


 


Est GFR (Non-Af Amer)  9    


 


POC Glucose (mg/dL)     ()  mg/dL


 


Random Glucose  159 H    ()  mg/dL


 


Calcium  10.8 H    (8.4-10.5)  mg/dL


 


Phosphorus     (2.5-4.5)  mg/dL


 


Magnesium     (1.7-2.2)  mg/dL


 


Total Bilirubin  3.0 H    (0.2-1.3)  mg/dL


 


GGT     (8-78)  U/L


 


AST  295 H    (15-59)  U/L


 


ALT  403 H    (7-56)  U/L


 


Alkaline Phosphatase  129    ()  U/L


 


Total Protein  7.8    (5.8-8.3)  g/dL


 


Albumin  3.3    (3.0-4.8)  g/dL


 


Globulin  4.5    gm/dL


 


Albumin/Globulin Ratio  0.7 L    (1.1-1.8)  


 


Amylase     ()  U/L


 


Lipase     ()  U/L


 


Arterial Blood Potassium     (3.6-5.2)  mmol/L


 


Cryptococcus Ab     


 


CMV Specimen Source     


 


CMV IgG Ab     U/mL


 


CMV IgM Ab     AU/mL


 


CMV DNA Quant PCR     IU/mL


 


CMV Qnt PCR log IU/mL     Log IU/mL


 


P. carinii Source     


 


P.carinii Concentration     (())  


 


Pneumocyst carinii DFA     


 


Ur Strep pneumoniae Ag     


 


Beta-(1,3)-D-Glucan     (())  pg/mL


 


B-(1,3)-D-Glucan Intrp     


 


WB Flow Cytometry    Reference test  














  05/22/17 05/22/17 05/22/17 Range/Units





  05:50 04:04 00:40 


 


WBC     (4.5-11.0)  10^3/ul


 


RBC     (3.5-6.1)  10^6/uL


 


Hgb     (14.0-18.0)  gm/dL


 


Hct     (42.0-52.0)  %


 


MCV     (80.0-105.0)  fL


 


MCH     (25.0-35.0)  pg


 


MCHC     (31.0-37.0)  g/dl


 


RDW     (11.5-14.5)  %


 


Plt Count     (120.0-450.0)  10^3/uL


 


MPV     (7.0-11.0)  fl


 


Gran %     (50.0-68.0)  %


 


Lymph % (Auto)     (22.0-35.0)  %


 


Mono % (Auto)     (1.0-6.0)  %


 


Eos % (Auto)     (1.5-5.0)  %


 


Baso % (Auto)     (0.0-3.0)  %


 


Gran #     (1.4-6.5)  


 


Lymph #     (1.2-3.4)  


 


Mono #     (0.1-0.6)  


 


Eos #     (0.0-0.7)  


 


Baso #     (0.0-2.0)  K/mm3


 


PT     (9.9-11.8)  Seconds


 


INR     (0.93-1.08)  


 


pCO2  50 H    (35-45)  mm/Hg


 


pO2  123.0 H    ()  mm/Hg


 


HCO3  23.5    (21-28)  mmol/L


 


ABG pH  7.28 L    (7.35-7.45)  


 


ABG Total CO2  25.0    (22-28)  mmol.L


 


ABG O2 Saturation  99.2 H    (95-98)  %


 


ABG O2 Content  11.4 L    (15-23)  ML/dl


 


ABG Base Excess  -3.3 L    (-2.0-3.0)  mmol/L


 


ABG Hemoglobin  8.6 L    (11.7-17.4)  g/dL


 


ABG Carboxyhemoglobin  3.4 H    (0.5-1.5)  %


 


POC ABG HHb (Measured)  0.7    (0-5)  %


 


ABG Methemoglobin  3.8 H    (0.0-3.0)  %


 


ABG O2 Capacity  11.5 L    (16-24)  mL/dl


 


ABG Potassium     (3.6-5.2)  mmol/L


 


Hgb O2 Saturation  92.0 L    (95.0-98.0)  %


 


Sodium     (132-148)  mmol/L


 


Glucose     ()  mg/dl


 


Lactate     (0.7-2.1)  mmol/L


 


FiO2  40.0    %


 


PEEP     


 


Pressure Support     


 


Potassium     (3.6-5.0)  mmol/L


 


Chloride     ()  mmol/L


 


Carbon Dioxide     (21-33)  mmol/L


 


Anion Gap     (10-20)  


 


BUN     (7-21)  mg/dL


 


Creatinine     (0.5-1.4)  mg/dL


 


Est GFR (African Amer)     


 


Est GFR (Non-Af Amer)     


 


POC Glucose (mg/dL)   183 H  224 H  ()  mg/dL


 


Random Glucose     ()  mg/dL


 


Calcium     (8.4-10.5)  mg/dL


 


Phosphorus     (2.5-4.5)  mg/dL


 


Magnesium     (1.7-2.2)  mg/dL


 


Total Bilirubin     (0.2-1.3)  mg/dL


 


GGT     (8-78)  U/L


 


AST     (15-59)  U/L


 


ALT     (7-56)  U/L


 


Alkaline Phosphatase     ()  U/L


 


Total Protein     (5.8-8.3)  g/dL


 


Albumin     (3.0-4.8)  g/dL


 


Globulin     gm/dL


 


Albumin/Globulin Ratio     (1.1-1.8)  


 


Amylase     ()  U/L


 


Lipase     ()  U/L


 


Arterial Blood Potassium     (3.6-5.2)  mmol/L


 


Cryptococcus Ab     


 


CMV Specimen Source     


 


CMV IgG Ab     U/mL


 


CMV IgM Ab     AU/mL


 


CMV DNA Quant PCR     IU/mL


 


CMV Qnt PCR log IU/mL     Log IU/mL


 


P. carinii Source     


 


P.carinii Concentration     (())  


 


Pneumocyst carinii DFA     


 


Ur Strep pneumoniae Ag     


 


Beta-(1,3)-D-Glucan     (())  pg/mL


 


B-(1,3)-D-Glucan Intrp     


 


WB Flow Cytometry     














  05/21/17 05/21/17 05/21/17 Range/Units





  20:59 19:09 15:57 


 


WBC     (4.5-11.0)  10^3/ul


 


RBC     (3.5-6.1)  10^6/uL


 


Hgb     (14.0-18.0)  gm/dL


 


Hct     (42.0-52.0)  %


 


MCV     (80.0-105.0)  fL


 


MCH     (25.0-35.0)  pg


 


MCHC     (31.0-37.0)  g/dl


 


RDW     (11.5-14.5)  %


 


Plt Count     (120.0-450.0)  10^3/uL


 


MPV     (7.0-11.0)  fl


 


Gran %     (50.0-68.0)  %


 


Lymph % (Auto)     (22.0-35.0)  %


 


Mono % (Auto)     (1.0-6.0)  %


 


Eos % (Auto)     (1.5-5.0)  %


 


Baso % (Auto)     (0.0-3.0)  %


 


Gran #     (1.4-6.5)  


 


Lymph #     (1.2-3.4)  


 


Mono #     (0.1-0.6)  


 


Eos #     (0.0-0.7)  


 


Baso #     (0.0-2.0)  K/mm3


 


PT     (9.9-11.8)  Seconds


 


INR     (0.93-1.08)  


 


pCO2     (35-45)  mm/Hg


 


pO2     ()  mm/Hg


 


HCO3     (21-28)  mmol/L


 


ABG pH     (7.35-7.45)  


 


ABG Total CO2     (22-28)  mmol.L


 


ABG O2 Saturation     (95-98)  %


 


ABG O2 Content     (15-23)  ML/dl


 


ABG Base Excess     (-2.0-3.0)  mmol/L


 


ABG Hemoglobin     (11.7-17.4)  g/dL


 


ABG Carboxyhemoglobin     (0.5-1.5)  %


 


POC ABG HHb (Measured)     (0-5)  %


 


ABG Methemoglobin     (0.0-3.0)  %


 


ABG O2 Capacity     (16-24)  mL/dl


 


ABG Potassium     (3.6-5.2)  mmol/L


 


Hgb O2 Saturation     (95.0-98.0)  %


 


Sodium     (132-148)  mmol/L


 


Glucose     ()  mg/dl


 


Lactate     (0.7-2.1)  mmol/L


 


FiO2     %


 


PEEP     


 


Pressure Support     


 


Potassium     (3.6-5.0)  mmol/L


 


Chloride     ()  mmol/L


 


Carbon Dioxide     (21-33)  mmol/L


 


Anion Gap     (10-20)  


 


BUN     (7-21)  mg/dL


 


Creatinine     (0.5-1.4)  mg/dL


 


Est GFR (African Amer)     


 


Est GFR (Non-Af Amer)     


 


POC Glucose (mg/dL)  227 H  272 H  314 H  ()  mg/dL


 


Random Glucose     ()  mg/dL


 


Calcium     (8.4-10.5)  mg/dL


 


Phosphorus     (2.5-4.5)  mg/dL


 


Magnesium     (1.7-2.2)  mg/dL


 


Total Bilirubin     (0.2-1.3)  mg/dL


 


GGT     (8-78)  U/L


 


AST     (15-59)  U/L


 


ALT     (7-56)  U/L


 


Alkaline Phosphatase     ()  U/L


 


Total Protein     (5.8-8.3)  g/dL


 


Albumin     (3.0-4.8)  g/dL


 


Globulin     gm/dL


 


Albumin/Globulin Ratio     (1.1-1.8)  


 


Amylase     ()  U/L


 


Lipase     ()  U/L


 


Arterial Blood Potassium     (3.6-5.2)  mmol/L


 


Cryptococcus Ab     


 


CMV Specimen Source     


 


CMV IgG Ab     U/mL


 


CMV IgM Ab     AU/mL


 


CMV DNA Quant PCR     IU/mL


 


CMV Qnt PCR log IU/mL     Log IU/mL


 


P. carinii Source     


 


P.carinii Concentration     (())  


 


Pneumocyst carinii DFA     


 


Ur Strep pneumoniae Ag     


 


Beta-(1,3)-D-Glucan     (())  pg/mL


 


B-(1,3)-D-Glucan Intrp     


 


WB Flow Cytometry     














  05/21/17 05/21/17 05/21/17 Range/Units





  10:22 06:14 03:07 


 


WBC     (4.5-11.0)  10^3/ul


 


RBC     (3.5-6.1)  10^6/uL


 


Hgb     (14.0-18.0)  gm/dL


 


Hct     (42.0-52.0)  %


 


MCV     (80.0-105.0)  fL


 


MCH     (25.0-35.0)  pg


 


MCHC     (31.0-37.0)  g/dl


 


RDW     (11.5-14.5)  %


 


Plt Count     (120.0-450.0)  10^3/uL


 


MPV     (7.0-11.0)  fl


 


Gran %     (50.0-68.0)  %


 


Lymph % (Auto)     (22.0-35.0)  %


 


Mono % (Auto)     (1.0-6.0)  %


 


Eos % (Auto)     (1.5-5.0)  %


 


Baso % (Auto)     (0.0-3.0)  %


 


Gran #     (1.4-6.5)  


 


Lymph #     (1.2-3.4)  


 


Mono #     (0.1-0.6)  


 


Eos #     (0.0-0.7)  


 


Baso #     (0.0-2.0)  K/mm3


 


PT     (9.9-11.8)  Seconds


 


INR     (0.93-1.08)  


 


pCO2     (35-45)  mm/Hg


 


pO2     ()  mm/Hg


 


HCO3     (21-28)  mmol/L


 


ABG pH     (7.35-7.45)  


 


ABG Total CO2     (22-28)  mmol.L


 


ABG O2 Saturation     (95-98)  %


 


ABG O2 Content     (15-23)  ML/dl


 


ABG Base Excess     (-2.0-3.0)  mmol/L


 


ABG Hemoglobin     (11.7-17.4)  g/dL


 


ABG Carboxyhemoglobin     (0.5-1.5)  %


 


POC ABG HHb (Measured)     (0-5)  %


 


ABG Methemoglobin     (0.0-3.0)  %


 


ABG O2 Capacity     (16-24)  mL/dl


 


ABG Potassium     (3.6-5.2)  mmol/L


 


Hgb O2 Saturation     (95.0-98.0)  %


 


Sodium     (132-148)  mmol/L


 


Glucose     ()  mg/dl


 


Lactate     (0.7-2.1)  mmol/L


 


FiO2     %


 


PEEP     


 


Pressure Support     


 


Potassium     (3.6-5.0)  mmol/L


 


Chloride     ()  mmol/L


 


Carbon Dioxide     (21-33)  mmol/L


 


Anion Gap     (10-20)  


 


BUN     (7-21)  mg/dL


 


Creatinine     (0.5-1.4)  mg/dL


 


Est GFR (African Amer)     


 


Est GFR (Non-Af Amer)     


 


POC Glucose (mg/dL)  280 H  333 H  325 H  ()  mg/dL


 


Random Glucose     ()  mg/dL


 


Calcium     (8.4-10.5)  mg/dL


 


Phosphorus     (2.5-4.5)  mg/dL


 


Magnesium     (1.7-2.2)  mg/dL


 


Total Bilirubin     (0.2-1.3)  mg/dL


 


GGT     (8-78)  U/L


 


AST     (15-59)  U/L


 


ALT     (7-56)  U/L


 


Alkaline Phosphatase     ()  U/L


 


Total Protein     (5.8-8.3)  g/dL


 


Albumin     (3.0-4.8)  g/dL


 


Globulin     gm/dL


 


Albumin/Globulin Ratio     (1.1-1.8)  


 


Amylase     ()  U/L


 


Lipase     ()  U/L


 


Arterial Blood Potassium     (3.6-5.2)  mmol/L


 


Cryptococcus Ab     


 


CMV Specimen Source     


 


CMV IgG Ab     U/mL


 


CMV IgM Ab     AU/mL


 


CMV DNA Quant PCR     IU/mL


 


CMV Qnt PCR log IU/mL     Log IU/mL


 


P. carinii Source     


 


P.carinii Concentration     (())  


 


Pneumocyst carinii DFA     


 


Ur Strep pneumoniae Ag     


 


Beta-(1,3)-D-Glucan     (())  pg/mL


 


B-(1,3)-D-Glucan Intrp     


 


WB Flow Cytometry     














  05/20/17 05/20/17 05/20/17 Range/Units





  22:37 16:38 15:30 


 


WBC     (4.5-11.0)  10^3/ul


 


RBC     (3.5-6.1)  10^6/uL


 


Hgb     (14.0-18.0)  gm/dL


 


Hct     (42.0-52.0)  %


 


MCV     (80.0-105.0)  fL


 


MCH     (25.0-35.0)  pg


 


MCHC     (31.0-37.0)  g/dl


 


RDW     (11.5-14.5)  %


 


Plt Count     (120.0-450.0)  10^3/uL


 


MPV     (7.0-11.0)  fl


 


Gran %     (50.0-68.0)  %


 


Lymph % (Auto)     (22.0-35.0)  %


 


Mono % (Auto)     (1.0-6.0)  %


 


Eos % (Auto)     (1.5-5.0)  %


 


Baso % (Auto)     (0.0-3.0)  %


 


Gran #     (1.4-6.5)  


 


Lymph #     (1.2-3.4)  


 


Mono #     (0.1-0.6)  


 


Eos #     (0.0-0.7)  


 


Baso #     (0.0-2.0)  K/mm3


 


PT     (9.9-11.8)  Seconds


 


INR     (0.93-1.08)  


 


pCO2     (35-45)  mm/Hg


 


pO2     ()  mm/Hg


 


HCO3     (21-28)  mmol/L


 


ABG pH     (7.35-7.45)  


 


ABG Total CO2     (22-28)  mmol.L


 


ABG O2 Saturation     (95-98)  %


 


ABG O2 Content     (15-23)  ML/dl


 


ABG Base Excess     (-2.0-3.0)  mmol/L


 


ABG Hemoglobin     (11.7-17.4)  g/dL


 


ABG Carboxyhemoglobin     (0.5-1.5)  %


 


POC ABG HHb (Measured)     (0-5)  %


 


ABG Methemoglobin     (0.0-3.0)  %


 


ABG O2 Capacity     (16-24)  mL/dl


 


ABG Potassium     (3.6-5.2)  mmol/L


 


Hgb O2 Saturation     (95.0-98.0)  %


 


Sodium     (132-148)  mmol/L


 


Glucose     ()  mg/dl


 


Lactate     (0.7-2.1)  mmol/L


 


FiO2     %


 


PEEP     


 


Pressure Support     


 


Potassium     (3.6-5.0)  mmol/L


 


Chloride     ()  mmol/L


 


Carbon Dioxide     (21-33)  mmol/L


 


Anion Gap     (10-20)  


 


BUN     (7-21)  mg/dL


 


Creatinine     (0.5-1.4)  mg/dL


 


Est GFR (African Amer)     


 


Est GFR (Non-Af Amer)     


 


POC Glucose (mg/dL)  323 H  255 H   ()  mg/dL


 


Random Glucose     ()  mg/dL


 


Calcium     (8.4-10.5)  mg/dL


 


Phosphorus     (2.5-4.5)  mg/dL


 


Magnesium     (1.7-2.2)  mg/dL


 


Total Bilirubin     (0.2-1.3)  mg/dL


 


GGT     (8-78)  U/L


 


AST     (15-59)  U/L


 


ALT     (7-56)  U/L


 


Alkaline Phosphatase     ()  U/L


 


Total Protein     (5.8-8.3)  g/dL


 


Albumin     (3.0-4.8)  g/dL


 


Globulin     gm/dL


 


Albumin/Globulin Ratio     (1.1-1.8)  


 


Amylase     ()  U/L


 


Lipase     ()  U/L


 


Arterial Blood Potassium     (3.6-5.2)  mmol/L


 


Cryptococcus Ab     


 


CMV Specimen Source     


 


CMV IgG Ab     U/mL


 


CMV IgM Ab     AU/mL


 


CMV DNA Quant PCR     IU/mL


 


CMV Qnt PCR log IU/mL     Log IU/mL


 


P. carinii Source    Sputum  


 


P.carinii Concentration      (())  


 


Pneumocyst carinii DFA    See note  


 


Ur Strep pneumoniae Ag     


 


Beta-(1,3)-D-Glucan     (())  pg/mL


 


B-(1,3)-D-Glucan Intrp     


 


WB Flow Cytometry     














  05/20/17 05/20/17 05/19/17 Range/Units





  11:16 02:40 16:12 


 


WBC     (4.5-11.0)  10^3/ul


 


RBC     (3.5-6.1)  10^6/uL


 


Hgb     (14.0-18.0)  gm/dL


 


Hct     (42.0-52.0)  %


 


MCV     (80.0-105.0)  fL


 


MCH     (25.0-35.0)  pg


 


MCHC     (31.0-37.0)  g/dl


 


RDW     (11.5-14.5)  %


 


Plt Count     (120.0-450.0)  10^3/uL


 


MPV     (7.0-11.0)  fl


 


Gran %     (50.0-68.0)  %


 


Lymph % (Auto)     (22.0-35.0)  %


 


Mono % (Auto)     (1.0-6.0)  %


 


Eos % (Auto)     (1.5-5.0)  %


 


Baso % (Auto)     (0.0-3.0)  %


 


Gran #     (1.4-6.5)  


 


Lymph #     (1.2-3.4)  


 


Mono #     (0.1-0.6)  


 


Eos #     (0.0-0.7)  


 


Baso #     (0.0-2.0)  K/mm3


 


PT     (9.9-11.8)  Seconds


 


INR     (0.93-1.08)  


 


pCO2     (35-45)  mm/Hg


 


pO2     ()  mm/Hg


 


HCO3     (21-28)  mmol/L


 


ABG pH     (7.35-7.45)  


 


ABG Total CO2     (22-28)  mmol.L


 


ABG O2 Saturation     (95-98)  %


 


ABG O2 Content     (15-23)  ML/dl


 


ABG Base Excess     (-2.0-3.0)  mmol/L


 


ABG Hemoglobin     (11.7-17.4)  g/dL


 


ABG Carboxyhemoglobin     (0.5-1.5)  %


 


POC ABG HHb (Measured)     (0-5)  %


 


ABG Methemoglobin     (0.0-3.0)  %


 


ABG O2 Capacity     (16-24)  mL/dl


 


ABG Potassium     (3.6-5.2)  mmol/L


 


Hgb O2 Saturation     (95.0-98.0)  %


 


Sodium     (132-148)  mmol/L


 


Glucose     ()  mg/dl


 


Lactate     (0.7-2.1)  mmol/L


 


FiO2     %


 


PEEP     


 


Pressure Support     


 


Potassium     (3.6-5.0)  mmol/L


 


Chloride     ()  mmol/L


 


Carbon Dioxide     (21-33)  mmol/L


 


Anion Gap     (10-20)  


 


BUN     (7-21)  mg/dL


 


Creatinine     (0.5-1.4)  mg/dL


 


Est GFR (African Amer)     


 


Est GFR (Non-Af Amer)     


 


POC Glucose (mg/dL)  309 H  305 H   ()  mg/dL


 


Random Glucose     ()  mg/dL


 


Calcium     (8.4-10.5)  mg/dL


 


Phosphorus     (2.5-4.5)  mg/dL


 


Magnesium     (1.7-2.2)  mg/dL


 


Total Bilirubin     (0.2-1.3)  mg/dL


 


GGT     (8-78)  U/L


 


AST     (15-59)  U/L


 


ALT     (7-56)  U/L


 


Alkaline Phosphatase     ()  U/L


 


Total Protein     (5.8-8.3)  g/dL


 


Albumin     (3.0-4.8)  g/dL


 


Globulin     gm/dL


 


Albumin/Globulin Ratio     (1.1-1.8)  


 


Amylase     ()  U/L


 


Lipase     ()  U/L


 


Arterial Blood Potassium     (3.6-5.2)  mmol/L


 


Cryptococcus Ab     


 


CMV Specimen Source     


 


CMV IgG Ab     U/mL


 


CMV IgM Ab     AU/mL


 


CMV DNA Quant PCR     IU/mL


 


CMV Qnt PCR log IU/mL     Log IU/mL


 


P. carinii Source     


 


P.carinii Concentration     (())  


 


Pneumocyst carinii DFA     


 


Ur Strep pneumoniae Ag     


 


Beta-(1,3)-D-Glucan      (())  pg/mL


 


B-(1,3)-D-Glucan Intrp    See note  


 


WB Flow Cytometry     














  05/19/17 05/19/17 05/19/17 Range/Units





  16:12 16:12 14:10 


 


WBC     (4.5-11.0)  10^3/ul


 


RBC     (3.5-6.1)  10^6/uL


 


Hgb     (14.0-18.0)  gm/dL


 


Hct     (42.0-52.0)  %


 


MCV     (80.0-105.0)  fL


 


MCH     (25.0-35.0)  pg


 


MCHC     (31.0-37.0)  g/dl


 


RDW     (11.5-14.5)  %


 


Plt Count     (120.0-450.0)  10^3/uL


 


MPV     (7.0-11.0)  fl


 


Gran %     (50.0-68.0)  %


 


Lymph % (Auto)     (22.0-35.0)  %


 


Mono % (Auto)     (1.0-6.0)  %


 


Eos % (Auto)     (1.5-5.0)  %


 


Baso % (Auto)     (0.0-3.0)  %


 


Gran #     (1.4-6.5)  


 


Lymph #     (1.2-3.4)  


 


Mono #     (0.1-0.6)  


 


Eos #     (0.0-0.7)  


 


Baso #     (0.0-2.0)  K/mm3


 


PT     (9.9-11.8)  Seconds


 


INR     (0.93-1.08)  


 


pCO2     (35-45)  mm/Hg


 


pO2     ()  mm/Hg


 


HCO3     (21-28)  mmol/L


 


ABG pH     (7.35-7.45)  


 


ABG Total CO2     (22-28)  mmol.L


 


ABG O2 Saturation     (95-98)  %


 


ABG O2 Content     (15-23)  ML/dl


 


ABG Base Excess     (-2.0-3.0)  mmol/L


 


ABG Hemoglobin     (11.7-17.4)  g/dL


 


ABG Carboxyhemoglobin     (0.5-1.5)  %


 


POC ABG HHb (Measured)     (0-5)  %


 


ABG Methemoglobin     (0.0-3.0)  %


 


ABG O2 Capacity     (16-24)  mL/dl


 


ABG Potassium     (3.6-5.2)  mmol/L


 


Hgb O2 Saturation     (95.0-98.0)  %


 


Sodium     (132-148)  mmol/L


 


Glucose     ()  mg/dl


 


Lactate     (0.7-2.1)  mmol/L


 


FiO2     %


 


PEEP     


 


Pressure Support     


 


Potassium     (3.6-5.0)  mmol/L


 


Chloride     ()  mmol/L


 


Carbon Dioxide     (21-33)  mmol/L


 


Anion Gap     (10-20)  


 


BUN     (7-21)  mg/dL


 


Creatinine     (0.5-1.4)  mg/dL


 


Est GFR (African Amer)     


 


Est GFR (Non-Af Amer)     


 


POC Glucose (mg/dL)    276 H  ()  mg/dL


 


Random Glucose     ()  mg/dL


 


Calcium     (8.4-10.5)  mg/dL


 


Phosphorus     (2.5-4.5)  mg/dL


 


Magnesium     (1.7-2.2)  mg/dL


 


Total Bilirubin     (0.2-1.3)  mg/dL


 


GGT     (8-78)  U/L


 


AST     (15-59)  U/L


 


ALT     (7-56)  U/L


 


Alkaline Phosphatase     ()  U/L


 


Total Protein     (5.8-8.3)  g/dL


 


Albumin     (3.0-4.8)  g/dL


 


Globulin     gm/dL


 


Albumin/Globulin Ratio     (1.1-1.8)  


 


Amylase     ()  U/L


 


Lipase     ()  U/L


 


Arterial Blood Potassium     (3.6-5.2)  mmol/L


 


Cryptococcus Ab     


 


CMV Specimen Source     


 


CMV IgG Ab  >10.00 H    U/mL


 


CMV IgM Ab  <30.00    AU/mL


 


CMV DNA Quant PCR     IU/mL


 


CMV Qnt PCR log IU/mL     Log IU/mL


 


P. carinii Source     


 


P.carinii Concentration     (())  


 


Pneumocyst carinii DFA     


 


Ur Strep pneumoniae Ag     


 


Beta-(1,3)-D-Glucan      (())  pg/mL


 


B-(1,3)-D-Glucan Intrp   See note   


 


WB Flow Cytometry     














  05/19/17 05/19/17 05/19/17 Range/Units





  10:45 10:45 10:00 


 


WBC     (4.5-11.0)  10^3/ul


 


RBC     (3.5-6.1)  10^6/uL


 


Hgb     (14.0-18.0)  gm/dL


 


Hct     (42.0-52.0)  %


 


MCV     (80.0-105.0)  fL


 


MCH     (25.0-35.0)  pg


 


MCHC     (31.0-37.0)  g/dl


 


RDW     (11.5-14.5)  %


 


Plt Count     (120.0-450.0)  10^3/uL


 


MPV     (7.0-11.0)  fl


 


Gran %     (50.0-68.0)  %


 


Lymph % (Auto)     (22.0-35.0)  %


 


Mono % (Auto)     (1.0-6.0)  %


 


Eos % (Auto)     (1.5-5.0)  %


 


Baso % (Auto)     (0.0-3.0)  %


 


Gran #     (1.4-6.5)  


 


Lymph #     (1.2-3.4)  


 


Mono #     (0.1-0.6)  


 


Eos #     (0.0-0.7)  


 


Baso #     (0.0-2.0)  K/mm3


 


PT     (9.9-11.8)  Seconds


 


INR     (0.93-1.08)  


 


pCO2     (35-45)  mm/Hg


 


pO2     ()  mm/Hg


 


HCO3     (21-28)  mmol/L


 


ABG pH     (7.35-7.45)  


 


ABG Total CO2     (22-28)  mmol.L


 


ABG O2 Saturation     (95-98)  %


 


ABG O2 Content     (15-23)  ML/dl


 


ABG Base Excess     (-2.0-3.0)  mmol/L


 


ABG Hemoglobin     (11.7-17.4)  g/dL


 


ABG Carboxyhemoglobin     (0.5-1.5)  %


 


POC ABG HHb (Measured)     (0-5)  %


 


ABG Methemoglobin     (0.0-3.0)  %


 


ABG O2 Capacity     (16-24)  mL/dl


 


ABG Potassium    4.6  (3.6-5.2)  mmol/L


 


Hgb O2 Saturation     (95.0-98.0)  %


 


Sodium    131.0 L  (132-148)  mmol/L


 


Glucose    258 H  ()  mg/dl


 


Lactate    0.9  (0.7-2.1)  mmol/L


 


FiO2    80  %


 


PEEP    10  


 


Pressure Support    12  


 


Potassium     (3.6-5.0)  mmol/L


 


Chloride     ()  mmol/L


 


Carbon Dioxide     (21-33)  mmol/L


 


Anion Gap     (10-20)  


 


BUN     (7-21)  mg/dL


 


Creatinine     (0.5-1.4)  mg/dL


 


Est GFR (African Amer)     


 


Est GFR (Non-Af Amer)     


 


POC Glucose (mg/dL)     ()  mg/dL


 


Random Glucose     ()  mg/dL


 


Calcium     (8.4-10.5)  mg/dL


 


Phosphorus     (2.5-4.5)  mg/dL


 


Magnesium     (1.7-2.2)  mg/dL


 


Total Bilirubin     (0.2-1.3)  mg/dL


 


GGT     (8-78)  U/L


 


AST     (15-59)  U/L


 


ALT     (7-56)  U/L


 


Alkaline Phosphatase     ()  U/L


 


Total Protein     (5.8-8.3)  g/dL


 


Albumin     (3.0-4.8)  g/dL


 


Globulin     gm/dL


 


Albumin/Globulin Ratio     (1.1-1.8)  


 


Amylase     ()  U/L


 


Lipase     ()  U/L


 


Arterial Blood Potassium    4.6  (3.6-5.2)  mmol/L


 


Cryptococcus Ab  <1:2    


 


CMV Specimen Source   Plasma   


 


CMV IgG Ab  >10.00 H    U/mL


 


CMV IgM Ab  <30.00    AU/mL


 


CMV DNA Quant PCR   <200   IU/mL


 


CMV Qnt PCR log IU/mL   <2.30   Log IU/mL


 


P. carinii Source     


 


P.carinii Concentration     (())  


 


Pneumocyst carinii DFA     


 


Ur Strep pneumoniae Ag     


 


Beta-(1,3)-D-Glucan     (())  pg/mL


 


B-(1,3)-D-Glucan Intrp     


 


WB Flow Cytometry     














  05/16/17 Range/Units





  06:30 


 


WBC   (4.5-11.0)  10^3/ul


 


RBC   (3.5-6.1)  10^6/uL


 


Hgb   (14.0-18.0)  gm/dL


 


Hct   (42.0-52.0)  %


 


MCV   (80.0-105.0)  fL


 


MCH   (25.0-35.0)  pg


 


MCHC   (31.0-37.0)  g/dl


 


RDW   (11.5-14.5)  %


 


Plt Count   (120.0-450.0)  10^3/uL


 


MPV   (7.0-11.0)  fl


 


Gran %   (50.0-68.0)  %


 


Lymph % (Auto)   (22.0-35.0)  %


 


Mono % (Auto)   (1.0-6.0)  %


 


Eos % (Auto)   (1.5-5.0)  %


 


Baso % (Auto)   (0.0-3.0)  %


 


Gran #   (1.4-6.5)  


 


Lymph #   (1.2-3.4)  


 


Mono #   (0.1-0.6)  


 


Eos #   (0.0-0.7)  


 


Baso #   (0.0-2.0)  K/mm3


 


PT   (9.9-11.8)  Seconds


 


INR   (0.93-1.08)  


 


pCO2   (35-45)  mm/Hg


 


pO2   ()  mm/Hg


 


HCO3   (21-28)  mmol/L


 


ABG pH   (7.35-7.45)  


 


ABG Total CO2   (22-28)  mmol.L


 


ABG O2 Saturation   (95-98)  %


 


ABG O2 Content   (15-23)  ML/dl


 


ABG Base Excess   (-2.0-3.0)  mmol/L


 


ABG Hemoglobin   (11.7-17.4)  g/dL


 


ABG Carboxyhemoglobin   (0.5-1.5)  %


 


POC ABG HHb (Measured)   (0-5)  %


 


ABG Methemoglobin   (0.0-3.0)  %


 


ABG O2 Capacity   (16-24)  mL/dl


 


ABG Potassium   (3.6-5.2)  mmol/L


 


Hgb O2 Saturation   (95.0-98.0)  %


 


Sodium   (132-148)  mmol/L


 


Glucose   ()  mg/dl


 


Lactate   (0.7-2.1)  mmol/L


 


FiO2   %


 


PEEP   


 


Pressure Support   


 


Potassium   (3.6-5.0)  mmol/L


 


Chloride   ()  mmol/L


 


Carbon Dioxide   (21-33)  mmol/L


 


Anion Gap   (10-20)  


 


BUN   (7-21)  mg/dL


 


Creatinine   (0.5-1.4)  mg/dL


 


Est GFR (African Amer)   


 


Est GFR (Non-Af Amer)   


 


POC Glucose (mg/dL)   ()  mg/dL


 


Random Glucose   ()  mg/dL


 


Calcium   (8.4-10.5)  mg/dL


 


Phosphorus   (2.5-4.5)  mg/dL


 


Magnesium   (1.7-2.2)  mg/dL


 


Total Bilirubin   (0.2-1.3)  mg/dL


 


GGT   (8-78)  U/L


 


AST   (15-59)  U/L


 


ALT   (7-56)  U/L


 


Alkaline Phosphatase   ()  U/L


 


Total Protein   (5.8-8.3)  g/dL


 


Albumin   (3.0-4.8)  g/dL


 


Globulin   gm/dL


 


Albumin/Globulin Ratio   (1.1-1.8)  


 


Amylase   ()  U/L


 


Lipase   ()  U/L


 


Arterial Blood Potassium   (3.6-5.2)  mmol/L


 


Cryptococcus Ab   


 


CMV Specimen Source   


 


CMV IgG Ab   U/mL


 


CMV IgM Ab   AU/mL


 


CMV DNA Quant PCR   IU/mL


 


CMV Qnt PCR log IU/mL   Log IU/mL


 


P. carinii Source   


 


P.carinii Concentration   (())  


 


Pneumocyst carinii DFA   


 


Ur Strep pneumoniae Ag  Not detected  


 


Beta-(1,3)-D-Glucan   (())  pg/mL


 


B-(1,3)-D-Glucan Intrp   


 


WB Flow Cytometry   








 Laboratory Results - last 24 hr











  05/16/17 05/19/17 05/19/17





  06:30 10:00 10:45


 


WBC   


 


RBC   


 


Hgb   


 


Hct   


 


MCV   


 


MCH   


 


MCHC   


 


RDW   


 


Plt Count   


 


MPV   


 


Gran %   


 


Lymph % (Auto)   


 


Mono % (Auto)   


 


Eos % (Auto)   


 


Baso % (Auto)   


 


Gran #   


 


Lymph #   


 


Mono #   


 


Eos #   


 


Baso #   


 


PT   


 


INR   


 


pCO2   


 


pO2   


 


HCO3   


 


ABG pH   


 


ABG Total CO2   


 


ABG O2 Saturation   


 


ABG O2 Content   


 


ABG Base Excess   


 


ABG Hemoglobin   


 


ABG Carboxyhemoglobin   


 


POC ABG HHb (Measured)   


 


ABG Methemoglobin   


 


ABG O2 Capacity   


 


ABG Potassium   4.6 


 


Hgb O2 Saturation   


 


Sodium   131.0 L 


 


Glucose   258 H 


 


Lactate   0.9 


 


FiO2   80 


 


PEEP   10 


 


Pressure Support   12 


 


Potassium   


 


Chloride   


 


Carbon Dioxide   


 


Anion Gap   


 


BUN   


 


Creatinine   


 


Est GFR ( Amer)   


 


Est GFR (Non-Af Amer)   


 


POC Glucose (mg/dL)   


 


Random Glucose   


 


Calcium   


 


Phosphorus   


 


Magnesium   


 


Total Bilirubin   


 


GGT   


 


AST   


 


ALT   


 


Alkaline Phosphatase   


 


Total Protein   


 


Albumin   


 


Globulin   


 


Albumin/Globulin Ratio   


 


Amylase   


 


Lipase   


 


Arterial Blood Potassium   4.6 


 


Cryptococcus Ab   


 


CMV Specimen Source    Plasma


 


CMV IgG Ab   


 


CMV IgM Ab   


 


CMV DNA Quant PCR    <200


 


CMV Qnt PCR log IU/mL    <2.30


 


P. carinii Source   


 


P.carinii Concentration   


 


Pneumocyst carinii DFA   


 


Ur Strep pneumoniae Ag  Not detected  


 


Beta-(1,3)-D-Glucan   


 


B-(1,3)-D-Glucan Intrp   


 


WB Flow Cytometry   














  05/19/17 05/19/17 05/19/17





  10:45 14:10 16:12


 


WBC   


 


RBC   


 


Hgb   


 


Hct   


 


MCV   


 


MCH   


 


MCHC   


 


RDW   


 


Plt Count   


 


MPV   


 


Gran %   


 


Lymph % (Auto)   


 


Mono % (Auto)   


 


Eos % (Auto)   


 


Baso % (Auto)   


 


Gran #   


 


Lymph #   


 


Mono #   


 


Eos #   


 


Baso #   


 


PT   


 


INR   


 


pCO2   


 


pO2   


 


HCO3   


 


ABG pH   


 


ABG Total CO2   


 


ABG O2 Saturation   


 


ABG O2 Content   


 


ABG Base Excess   


 


ABG Hemoglobin   


 


ABG Carboxyhemoglobin   


 


POC ABG HHb (Measured)   


 


ABG Methemoglobin   


 


ABG O2 Capacity   


 


ABG Potassium   


 


Hgb O2 Saturation   


 


Sodium   


 


Glucose   


 


Lactate   


 


FiO2   


 


PEEP   


 


Pressure Support   


 


Potassium   


 


Chloride   


 


Carbon Dioxide   


 


Anion Gap   


 


BUN   


 


Creatinine   


 


Est GFR ( Amer)   


 


Est GFR (Non-Af Amer)   


 


POC Glucose (mg/dL)   276 H 


 


Random Glucose   


 


Calcium   


 


Phosphorus   


 


Magnesium   


 


Total Bilirubin   


 


GGT   


 


AST   


 


ALT   


 


Alkaline Phosphatase   


 


Total Protein   


 


Albumin   


 


Globulin   


 


Albumin/Globulin Ratio   


 


Amylase   


 


Lipase   


 


Arterial Blood Potassium   


 


Cryptococcus Ab  <1:2  


 


CMV Specimen Source   


 


CMV IgG Ab  >10.00 H  


 


CMV IgM Ab  <30.00  


 


CMV DNA Quant PCR   


 


CMV Qnt PCR log IU/mL   


 


P. carinii Source   


 


P.carinii Concentration   


 


Pneumocyst carinii DFA   


 


Ur Strep pneumoniae Ag   


 


Beta-(1,3)-D-Glucan    


 


B-(1,3)-D-Glucan Intrp    See note


 


WB Flow Cytometry   














  05/19/17 05/19/17 05/20/17





  16:12 16:12 02:40


 


WBC   


 


RBC   


 


Hgb   


 


Hct   


 


MCV   


 


MCH   


 


MCHC   


 


RDW   


 


Plt Count   


 


MPV   


 


Gran %   


 


Lymph % (Auto)   


 


Mono % (Auto)   


 


Eos % (Auto)   


 


Baso % (Auto)   


 


Gran #   


 


Lymph #   


 


Mono #   


 


Eos #   


 


Baso #   


 


PT   


 


INR   


 


pCO2   


 


pO2   


 


HCO3   


 


ABG pH   


 


ABG Total CO2   


 


ABG O2 Saturation   


 


ABG O2 Content   


 


ABG Base Excess   


 


ABG Hemoglobin   


 


ABG Carboxyhemoglobin   


 


POC ABG HHb (Measured)   


 


ABG Methemoglobin   


 


ABG O2 Capacity   


 


ABG Potassium   


 


Hgb O2 Saturation   


 


Sodium   


 


Glucose   


 


Lactate   


 


FiO2   


 


PEEP   


 


Pressure Support   


 


Potassium   


 


Chloride   


 


Carbon Dioxide   


 


Anion Gap   


 


BUN   


 


Creatinine   


 


Est GFR ( Amer)   


 


Est GFR (Non-Af Amer)   


 


POC Glucose (mg/dL)    305 H


 


Random Glucose   


 


Calcium   


 


Phosphorus   


 


Magnesium   


 


Total Bilirubin   


 


GGT   


 


AST   


 


ALT   


 


Alkaline Phosphatase   


 


Total Protein   


 


Albumin   


 


Globulin   


 


Albumin/Globulin Ratio   


 


Amylase   


 


Lipase   


 


Arterial Blood Potassium   


 


Cryptococcus Ab   


 


CMV Specimen Source   


 


CMV IgG Ab  >10.00 H  


 


CMV IgM Ab  <30.00  


 


CMV DNA Quant PCR   


 


CMV Qnt PCR log IU/mL   


 


P. carinii Source   


 


P.carinii Concentration   


 


Pneumocyst carinii DFA   


 


Ur Strep pneumoniae Ag   


 


Beta-(1,3)-D-Glucan    


 


B-(1,3)-D-Glucan Intrp   See note 


 


WB Flow Cytometry   














  05/20/17 05/20/17 05/20/17





  11:16 15:30 16:38


 


WBC   


 


RBC   


 


Hgb   


 


Hct   


 


MCV   


 


MCH   


 


MCHC   


 


RDW   


 


Plt Count   


 


MPV   


 


Gran %   


 


Lymph % (Auto)   


 


Mono % (Auto)   


 


Eos % (Auto)   


 


Baso % (Auto)   


 


Gran #   


 


Lymph #   


 


Mono #   


 


Eos #   


 


Baso #   


 


PT   


 


INR   


 


pCO2   


 


pO2   


 


HCO3   


 


ABG pH   


 


ABG Total CO2   


 


ABG O2 Saturation   


 


ABG O2 Content   


 


ABG Base Excess   


 


ABG Hemoglobin   


 


ABG Carboxyhemoglobin   


 


POC ABG HHb (Measured)   


 


ABG Methemoglobin   


 


ABG O2 Capacity   


 


ABG Potassium   


 


Hgb O2 Saturation   


 


Sodium   


 


Glucose   


 


Lactate   


 


FiO2   


 


PEEP   


 


Pressure Support   


 


Potassium   


 


Chloride   


 


Carbon Dioxide   


 


Anion Gap   


 


BUN   


 


Creatinine   


 


Est GFR ( Amer)   


 


Est GFR (Non-Af Amer)   


 


POC Glucose (mg/dL)  309 H   255 H


 


Random Glucose   


 


Calcium   


 


Phosphorus   


 


Magnesium   


 


Total Bilirubin   


 


GGT   


 


AST   


 


ALT   


 


Alkaline Phosphatase   


 


Total Protein   


 


Albumin   


 


Globulin   


 


Albumin/Globulin Ratio   


 


Amylase   


 


Lipase   


 


Arterial Blood Potassium   


 


Cryptococcus Ab   


 


CMV Specimen Source   


 


CMV IgG Ab   


 


CMV IgM Ab   


 


CMV DNA Quant PCR   


 


CMV Qnt PCR log IU/mL   


 


P. carinii Source   Sputum 


 


P.carinii Concentration    


 


Pneumocyst carinii DFA   See note 


 


Ur Strep pneumoniae Ag   


 


Beta-(1,3)-D-Glucan   


 


B-(1,3)-D-Glucan Intrp   


 


WB Flow Cytometry   














  05/20/17 05/21/17 05/21/17





  22:37 03:07 06:14


 


WBC   


 


RBC   


 


Hgb   


 


Hct   


 


MCV   


 


MCH   


 


MCHC   


 


RDW   


 


Plt Count   


 


MPV   


 


Gran %   


 


Lymph % (Auto)   


 


Mono % (Auto)   


 


Eos % (Auto)   


 


Baso % (Auto)   


 


Gran #   


 


Lymph #   


 


Mono #   


 


Eos #   


 


Baso #   


 


PT   


 


INR   


 


pCO2   


 


pO2   


 


HCO3   


 


ABG pH   


 


ABG Total CO2   


 


ABG O2 Saturation   


 


ABG O2 Content   


 


ABG Base Excess   


 


ABG Hemoglobin   


 


ABG Carboxyhemoglobin   


 


POC ABG HHb (Measured)   


 


ABG Methemoglobin   


 


ABG O2 Capacity   


 


ABG Potassium   


 


Hgb O2 Saturation   


 


Sodium   


 


Glucose   


 


Lactate   


 


FiO2   


 


PEEP   


 


Pressure Support   


 


Potassium   


 


Chloride   


 


Carbon Dioxide   


 


Anion Gap   


 


BUN   


 


Creatinine   


 


Est GFR ( Amer)   


 


Est GFR (Non-Af Amer)   


 


POC Glucose (mg/dL)  323 H  325 H  333 H


 


Random Glucose   


 


Calcium   


 


Phosphorus   


 


Magnesium   


 


Total Bilirubin   


 


GGT   


 


AST   


 


ALT   


 


Alkaline Phosphatase   


 


Total Protein   


 


Albumin   


 


Globulin   


 


Albumin/Globulin Ratio   


 


Amylase   


 


Lipase   


 


Arterial Blood Potassium   


 


Cryptococcus Ab   


 


CMV Specimen Source   


 


CMV IgG Ab   


 


CMV IgM Ab   


 


CMV DNA Quant PCR   


 


CMV Qnt PCR log IU/mL   


 


P. carinii Source   


 


P.carinii Concentration   


 


Pneumocyst carinii DFA   


 


Ur Strep pneumoniae Ag   


 


Beta-(1,3)-D-Glucan   


 


B-(1,3)-D-Glucan Intrp   


 


WB Flow Cytometry   














  05/21/17 05/21/17 05/21/17





  10:22 15:57 19:09


 


WBC   


 


RBC   


 


Hgb   


 


Hct   


 


MCV   


 


MCH   


 


MCHC   


 


RDW   


 


Plt Count   


 


MPV   


 


Gran %   


 


Lymph % (Auto)   


 


Mono % (Auto)   


 


Eos % (Auto)   


 


Baso % (Auto)   


 


Gran #   


 


Lymph #   


 


Mono #   


 


Eos #   


 


Baso #   


 


PT   


 


INR   


 


pCO2   


 


pO2   


 


HCO3   


 


ABG pH   


 


ABG Total CO2   


 


ABG O2 Saturation   


 


ABG O2 Content   


 


ABG Base Excess   


 


ABG Hemoglobin   


 


ABG Carboxyhemoglobin   


 


POC ABG HHb (Measured)   


 


ABG Methemoglobin   


 


ABG O2 Capacity   


 


ABG Potassium   


 


Hgb O2 Saturation   


 


Sodium   


 


Glucose   


 


Lactate   


 


FiO2   


 


PEEP   


 


Pressure Support   


 


Potassium   


 


Chloride   


 


Carbon Dioxide   


 


Anion Gap   


 


BUN   


 


Creatinine   


 


Est GFR ( Amer)   


 


Est GFR (Non-Af Amer)   


 


POC Glucose (mg/dL)  280 H  314 H  272 H


 


Random Glucose   


 


Calcium   


 


Phosphorus   


 


Magnesium   


 


Total Bilirubin   


 


GGT   


 


AST   


 


ALT   


 


Alkaline Phosphatase   


 


Total Protein   


 


Albumin   


 


Globulin   


 


Albumin/Globulin Ratio   


 


Amylase   


 


Lipase   


 


Arterial Blood Potassium   


 


Cryptococcus Ab   


 


CMV Specimen Source   


 


CMV IgG Ab   


 


CMV IgM Ab   


 


CMV DNA Quant PCR   


 


CMV Qnt PCR log IU/mL   


 


P. carinii Source   


 


P.carinii Concentration   


 


Pneumocyst carinii DFA   


 


Ur Strep pneumoniae Ag   


 


Beta-(1,3)-D-Glucan   


 


B-(1,3)-D-Glucan Intrp   


 


WB Flow Cytometry   














  05/21/17 05/22/17 05/22/17





  20:59 00:40 04:04


 


WBC   


 


RBC   


 


Hgb   


 


Hct   


 


MCV   


 


MCH   


 


MCHC   


 


RDW   


 


Plt Count   


 


MPV   


 


Gran %   


 


Lymph % (Auto)   


 


Mono % (Auto)   


 


Eos % (Auto)   


 


Baso % (Auto)   


 


Gran #   


 


Lymph #   


 


Mono #   


 


Eos #   


 


Baso #   


 


PT   


 


INR   


 


pCO2   


 


pO2   


 


HCO3   


 


ABG pH   


 


ABG Total CO2   


 


ABG O2 Saturation   


 


ABG O2 Content   


 


ABG Base Excess   


 


ABG Hemoglobin   


 


ABG Carboxyhemoglobin   


 


POC ABG HHb (Measured)   


 


ABG Methemoglobin   


 


ABG O2 Capacity   


 


ABG Potassium   


 


Hgb O2 Saturation   


 


Sodium   


 


Glucose   


 


Lactate   


 


FiO2   


 


PEEP   


 


Pressure Support   


 


Potassium   


 


Chloride   


 


Carbon Dioxide   


 


Anion Gap   


 


BUN   


 


Creatinine   


 


Est GFR ( Amer)   


 


Est GFR (Non-Af Amer)   


 


POC Glucose (mg/dL)  227 H  224 H  183 H


 


Random Glucose   


 


Calcium   


 


Phosphorus   


 


Magnesium   


 


Total Bilirubin   


 


GGT   


 


AST   


 


ALT   


 


Alkaline Phosphatase   


 


Total Protein   


 


Albumin   


 


Globulin   


 


Albumin/Globulin Ratio   


 


Amylase   


 


Lipase   


 


Arterial Blood Potassium   


 


Cryptococcus Ab   


 


CMV Specimen Source   


 


CMV IgG Ab   


 


CMV IgM Ab   


 


CMV DNA Quant PCR   


 


CMV Qnt PCR log IU/mL   


 


P. carinii Source   


 


P.carinii Concentration   


 


Pneumocyst carinii DFA   


 


Ur Strep pneumoniae Ag   


 


Beta-(1,3)-D-Glucan   


 


B-(1,3)-D-Glucan Intrp   


 


WB Flow Cytometry   














  05/22/17 05/22/17 05/22/17





  05:50 06:40 06:40


 


WBC    29.6 H* D


 


RBC    2.63 L


 


Hgb    8.1 L


 


Hct    23.5 L


 


MCV    89.4


 


MCH    30.8


 


MCHC    34.5


 


RDW    20.7 H


 


Plt Count    103 L


 


MPV    9.8


 


Gran %    74.1 H


 


Lymph % (Auto)    12.3 L


 


Mono % (Auto)    11.2 H


 


Eos % (Auto)    1.8


 


Baso % (Auto)    0.6


 


Gran #    21.94 H


 


Lymph #    3.6 H


 


Mono #    3.3 H


 


Eos #    0.5


 


Baso #    0.18


 


PT   


 


INR   


 


pCO2  50 H  


 


pO2  123.0 H  


 


HCO3  23.5  


 


ABG pH  7.28 L  


 


ABG Total CO2  25.0  


 


ABG O2 Saturation  99.2 H  


 


ABG O2 Content  11.4 L  


 


ABG Base Excess  -3.3 L  


 


ABG Hemoglobin  8.6 L  


 


ABG Carboxyhemoglobin  3.4 H  


 


POC ABG HHb (Measured)  0.7  


 


ABG Methemoglobin  3.8 H  


 


ABG O2 Capacity  11.5 L  


 


ABG Potassium   


 


Hgb O2 Saturation  92.0 L  


 


Sodium   


 


Glucose   


 


Lactate   


 


FiO2  40.0  


 


PEEP   


 


Pressure Support   


 


Potassium   


 


Chloride   


 


Carbon Dioxide   


 


Anion Gap   


 


BUN   


 


Creatinine   


 


Est GFR ( Amer)   


 


Est GFR (Non-Af Amer)   


 


POC Glucose (mg/dL)   


 


Random Glucose   


 


Calcium   


 


Phosphorus   


 


Magnesium   


 


Total Bilirubin   


 


GGT   


 


AST   


 


ALT   


 


Alkaline Phosphatase   


 


Total Protein   


 


Albumin   


 


Globulin   


 


Albumin/Globulin Ratio   


 


Amylase   


 


Lipase   


 


Arterial Blood Potassium   


 


Cryptococcus Ab   


 


CMV Specimen Source   


 


CMV IgG Ab   


 


CMV IgM Ab   


 


CMV DNA Quant PCR   


 


CMV Qnt PCR log IU/mL   


 


P. carinii Source   


 


P.carinii Concentration   


 


Pneumocyst carinii DFA   


 


Ur Strep pneumoniae Ag   


 


Beta-(1,3)-D-Glucan   


 


B-(1,3)-D-Glucan Intrp   


 


WB Flow Cytometry   Reference test 














  05/22/17 05/22/17 05/22/17





  06:40 06:40 10:00


 


WBC   


 


RBC   


 


Hgb   


 


Hct   


 


MCV   


 


MCH   


 


MCHC   


 


RDW   


 


Plt Count   


 


MPV   


 


Gran %   


 


Lymph % (Auto)   


 


Mono % (Auto)   


 


Eos % (Auto)   


 


Baso % (Auto)   


 


Gran #   


 


Lymph #   


 


Mono #   


 


Eos #   


 


Baso #   


 


PT    12.6 H


 


INR    1.17 H


 


pCO2   


 


pO2   


 


HCO3   


 


ABG pH   


 


ABG Total CO2   


 


ABG O2 Saturation   


 


ABG O2 Content   


 


ABG Base Excess   


 


ABG Hemoglobin   


 


ABG Carboxyhemoglobin   


 


POC ABG HHb (Measured)   


 


ABG Methemoglobin   


 


ABG O2 Capacity   


 


ABG Potassium   


 


Hgb O2 Saturation   


 


Sodium  134  


 


Glucose   


 


Lactate   


 


FiO2   


 


PEEP   


 


Pressure Support   


 


Potassium  4.0  


 


Chloride  96 L  


 


Carbon Dioxide  21  


 


Anion Gap  21 H  


 


BUN  58 H  


 


Creatinine  6.1 H  


 


Est GFR ( Amer)  11  


 


Est GFR (Non-Af Amer)  9  


 


POC Glucose (mg/dL)   


 


Random Glucose  159 H  


 


Calcium  10.8 H  


 


Phosphorus   8.1 H 


 


Magnesium   2.0 


 


Total Bilirubin  3.0 H  


 


GGT   242 H 


 


AST  295 H  


 


ALT  403 H  


 


Alkaline Phosphatase  129  


 


Total Protein  7.8  


 


Albumin  3.3  


 


Globulin  4.5  


 


Albumin/Globulin Ratio  0.7 L  


 


Amylase   454 H 


 


Lipase   5748 H 


 


Arterial Blood Potassium   


 


Cryptococcus Ab   


 


CMV Specimen Source   


 


CMV IgG Ab   


 


CMV IgM Ab   


 


CMV DNA Quant PCR   


 


CMV Qnt PCR log IU/mL   


 


P. carinii Source   


 


P.carinii Concentration   


 


Pneumocyst carinii DFA   


 


Ur Strep pneumoniae Ag   


 


Beta-(1,3)-D-Glucan   


 


B-(1,3)-D-Glucan Intrp   


 


WB Flow Cytometry   














  05/22/17





  11:11


 


WBC 


 


RBC 


 


Hgb 


 


Hct 


 


MCV 


 


MCH 


 


MCHC 


 


RDW 


 


Plt Count 


 


MPV 


 


Gran % 


 


Lymph % (Auto) 


 


Mono % (Auto) 


 


Eos % (Auto) 


 


Baso % (Auto) 


 


Gran # 


 


Lymph # 


 


Mono # 


 


Eos # 


 


Baso # 


 


PT 


 


INR 


 


pCO2 


 


pO2 


 


HCO3 


 


ABG pH 


 


ABG Total CO2 


 


ABG O2 Saturation 


 


ABG O2 Content 


 


ABG Base Excess 


 


ABG Hemoglobin 


 


ABG Carboxyhemoglobin 


 


POC ABG HHb (Measured) 


 


ABG Methemoglobin 


 


ABG O2 Capacity 


 


ABG Potassium 


 


Hgb O2 Saturation 


 


Sodium 


 


Glucose 


 


Lactate 


 


FiO2 


 


PEEP 


 


Pressure Support 


 


Potassium 


 


Chloride 


 


Carbon Dioxide 


 


Anion Gap 


 


BUN 


 


Creatinine 


 


Est GFR ( Amer) 


 


Est GFR (Non-Af Amer) 


 


POC Glucose (mg/dL)  181 H


 


Random Glucose 


 


Calcium 


 


Phosphorus 


 


Magnesium 


 


Total Bilirubin 


 


GGT 


 


AST 


 


ALT 


 


Alkaline Phosphatase 


 


Total Protein 


 


Albumin 


 


Globulin 


 


Albumin/Globulin Ratio 


 


Amylase 


 


Lipase 


 


Arterial Blood Potassium 


 


Cryptococcus Ab 


 


CMV Specimen Source 


 


CMV IgG Ab 


 


CMV IgM Ab 


 


CMV DNA Quant PCR 


 


CMV Qnt PCR log IU/mL 


 


P. carinii Source 


 


P.carinii Concentration 


 


Pneumocyst carinii DFA 


 


Ur Strep pneumoniae Ag 


 


Beta-(1,3)-D-Glucan 


 


B-(1,3)-D-Glucan Intrp 


 


WB Flow Cytometry 














Critical Care Progress Note





- Nutrition


Nutrition: 


 Nutrition











 Category Date Time Status


 


 NPO Diet [DIET] Diets  05/17/17 Breakfast Ordered














Attending/Attestation





- Attestation


I have personally seen and examined this patient.: Yes


I have fully participated in the care of the patient.: Yes


I have reviewed all pertinent clinical information: Yes


Notes (Text): 





05/22/17 16:01


60 y/o M w/ MODS





Acute transaminitis  resolving post NAC protocol.


Acute pancreatitis , possibly drug induced vs GS related. May need US RUQ. 

Lipase resolving


Acute MRSA PNA- Repeat CT chest shows some improvement. On Zyvox with 

improvement to WBC. No further fevers . All other labs from Sputum pending. 


ESRD on HD ( HD today) 


PRVC , off sedation following some commands, trail of SBT currently. 


G.I and Surgery on board . 





DVT P Heparin sq tid


ppi 


Tube feeds to resume.





cc time 65 min

## 2017-05-22 NOTE — PN
DATE: 05/22/2017



The patient is a 61-year-old, seen and examined, remains intubated, sedated[facility]



PHYSICAL EXAMINATION:

VITAL SIGNS:  He has temperature 97.5, pulse 105, respirations 34, blood pressure 124/53.

LUNGS:  Bilateral fair airflow.  Occasional soft crackle at bases.

HEART:  S1, S2 audible.

ABDOMEN:  Soft, obese, nontender, no rebound, no guarding.

NEUROLOGIC:  The patient is sedated and on vent.



LABORATORY EXAMINATION:  WBCs 29.6, hemoglobin 8.1, hematocrit 23.5, platelets of 103.  PT is 12.6, I
NR 1.17.  Chemistry:  Sodium 134, potassium 4.0, chloride 96, CO2 21, BUN 58, creatinine 6.1, blood s
ugar of 181.  His sputum culture positive for Staph.  Stool for C. diff is negative.



ASSESSMENT:

1.  Bilateral infiltrates.

2.  Leukocytosis, seems to be improving.

3.  Respiratory failure, on vent.

4.  Acute liver failure, improving.

5.  End-stage renal disease, on hemodialysis.

6.  Chronic anemia.



PLAN:  Continue patient on vent support.  He is receiving hemodialysis.  Weaning trial will be starte
d in a.m.  I will continue on current antibiotic that includes meropenem, Xifaxan, and Zyvox.  I will
 renew his meropenem and will reevaluate patient in a.m.





__________________________________________

Antonieta Rae MD







cc:



DD: 05/22/2017 12:43:42  [id number]

TT: 05/22/2017 13:00:04

Confirmation # [id number]

Dictation # [id number]

en

## 2017-05-22 NOTE — PN
DATE: 05/22/2017



Seen and examined at the bedside this morning.  The patient is getting dialysis.  He remains intubate
d and remains on ventilator.



VITAL SIGNS:  Temperature is 97.5, blood pressure is 124/53, pulse rate is 105, respirations 24, O2 s
at 99 on vent.



LABORATORY DATA:  WBC 29.6, H and H are 8.1 and 23.5, platelets are 103.  WBC count has improved.  Hi
s PT is 12.6, INR is 1.17; is about the same.  Sodium 134, K is 4.0, BUN is 58, creatinine is 6.1.  H
is mag is 2.0.  Total bilirubin is 3.0, GGT is 242, , , alk phos is 129.  There is some
 improvement in his liver enzymes.  His amylase is 454, lipase is 5748; this is improving.  



PHYSICAL EXAMINATION:

HEENT:  Sclerae are icteric.  The patient is jaundiced.

NECK:  Supple.

CARDIAC:  S1, S2.

LUNGS:  With breath sounds that are decreased at the bases with positive rhonchi, but positive air en
try.  No rales or wheeze.

ABDOMEN:  With bowel sounds.  It is softly distended.  Looks like he has some tenderness.

EXTREMITIES:  Bilateral edema.



ASSESSMENT:  This is a 61-year-old male with end-stage renal disease, admitted with acute liver failu
re, sepsis and with bilateral pneumonia.  He is also positive methicillin-resistant Staphylococcus au
reus and worsening white blood cell count which is now improving.  The patient did have a repeat CAT 
scan of abdomen and pelvis which shows additional lung infiltrate and pneumonia and changes in the pa
ncreas that appear like pancreatitis.  The patient does have gallstones but common bile duct was norm
al.  The pancreatitis, can consider if this is secondary to medication.



PLAN:  Continue to trend the liver enzymes.  He is on imipenem, Xifaxan, and Flagyl.  The patient is 
on Precedex and fentanyl.  He is on DVT prophylaxis, heparin.  On Zyvox, meropenem as well as Flagyl.
  He is on PPI and vancomycin oral.  The patient was seen and case discussed with Dr. Morales.  The 
patient is off acetylcysteine now.





__________________________________________

Naya HINES







cc:



DD: 05/22/2017 12:00:26  451

TT: 05/22/2017 12:36:32

Confirmation # [id number]

Dictation # [id number]

mn

## 2017-05-22 NOTE — CP.PCM.PN
Subjective





- Date & Time of Evaluation


Date of Evaluation: 05/22/17


Time of Evaluation: 09:00





- Subjective


Subjective: 





Continues to be intubated and sedated, no fevers, still with loose stools but 

slowly improving.





Objective





- Vital Signs/Intake and Output


Vital Signs (last 24 hours): 


 











Temp Pulse Resp BP Pulse Ox


 


 97.2 F L  64   30 H  100/40 L  100 


 


 05/22/17 12:00  05/22/17 12:00  05/22/17 12:00  05/22/17 12:00  05/22/17 12:00








Intake and Output: 


 











 05/22/17 05/22/17





 06:59 18:59


 


Intake Total 1064 


 


Output Total 500 2000


 


Balance 564 -2000














- Medications


Medications: 


 Current Medications





Albuterol/Ipratropium (Duoneb 3 Mg/0.5 Mg (3 Ml) Ud)  3 ml IH Q2H PRN


   PRN Reason: Shortness of Breath


Albuterol/Ipratropium (Duoneb 3 Mg/0.5 Mg (3 Ml) Ud)  3 ml IH [id number] OSEAS


Heparin Sodium (Porcine) (Heparin)  5,000 units SC Q12 [facility]


   PRN Reason: Protocol


   Last Admin: 05/22/17 09:52 Dose:  5,000 units


Hydralazine HCl (Apresoline)  10 mg IVP Q6 PRN


   PRN Reason: SBP >180


   Last Admin: 05/17/17 02:05 Dose:  10 mg


Meropenem 500 mg/ Sodium (Chloride)  100 mls @ 100 mls/hr IVPB Q12 [facility]


   Stop: 05/23/17 06:23


   Last Admin: 05/22/17 10:24 Dose:  100 mls/hr


Metronidazole (Flagyl)  500 mg in 100 mls @ 100 mls/hr IVPB Q8 [facility]


   PRN Reason: Protocol


   Last Admin: 05/22/17 05:17 Dose:  100 mls/hr


Linezolid (Zyvox 600mg/300ml D5w)  600 mg in 300 mls @ 200 mls/hr IVPB Q12 [facility]


   PRN Reason: Protocol


   Stop: 05/26/17 22:01


   Last Admin: 05/22/17 09:49 Dose:  200 mls/hr


Fentanyl Citrate (Fentanyl Citrate/Sodium Chloride 1 Mg/100 Ml)  1,000 mcg in 

100 mls @ 10 mls/hr IV .Q10H PRN; Protocol; 100 MCG/HR


   PRN Reason: TITRATE PER MD ORDER


   Last Admin: 05/22/17 09:36 Dose:  100 mcg/hr, 10 mls/hr


Dexmedetomidine HCl (Precedex 4 Mcg/Ml (100 Ml))  400 mcg in 100 mls @ 12.111 

mls/hr IV .Q8H16M PRN; Protocol; 0.4 MCG/KG/HR


   PRN Reason: Agitation


   Last Admin: 05/22/17 09:48 Dose:  0.4 mcg/kg/hr, 12.111 mls/hr


Insulin Detemir (Levemir)  20 unit SC DAILY [facility]


   Last Admin: 05/21/17 10:37 Dose:  20 unit


Insulin Human Regular (Humulin R Med)  0 units SC Q4 [facility]


   Last Admin: 05/22/17 04:25 Dose:  Not Given


Lactulose (Enulose)  20 gm PO Q8H [facility]


   Last Admin: 05/22/17 02:18 Dose:  20 gm


Ondansetron HCl (Zofran Inj)  4 mg IVP Q6H PRN


   PRN Reason: Nausea/Vomiting


Pantoprazole Sodium (Protonix Inj)  40 mg IVP DAILY [facility]


   Last Admin: 05/22/17 10:21 Dose:  40 mg


Rifaximin (Xifaxan)  550 mg PO BID [facility]


   PRN Reason: Protocol


   Last Admin: 05/22/17 09:50 Dose:  Not Given


Vancomycin HCl (Vancocin 25 Mg/Ml (Oral Use))  500 mg PO QID [facility]


   PRN Reason: Protocol


   Last Admin: 05/22/17 09:14 Dose:  500 mg











- Labs


Labs: 


 





 05/22/17 06:40 





 05/22/17 06:40 





 











PT  12.6 Seconds (9.9-11.8)  H  05/22/17  10:00    


 


INR  1.17  (0.93-1.08)  H  05/22/17  10:00    


 


APTT  26.8 Seconds (23.7-30.8)   05/18/17  07:00    














- Constitutional


Appears: Other (Intubated and sedated)





- ENT Exam


Additional comments: 





ET tube in place





- Neck Exam


Neck Exam: absent: Lymphadenopathy, Meningismus





- Respiratory Exam


Respiratory Exam: Decreased Breath Sounds





- Cardiovascular Exam


Cardiovascular Exam: +S1, +S2





- GI/Abdominal Exam


GI & Abdominal Exam: Soft.  absent: Tenderness





Assessment and Plan





- Assessment and Plan (Free Text)


Plan: 





Assessment


Severe sepsis with ventilator-dependent respiratory failure and acute liver 

failure/acute fulminant hepatitis due to bilateral healthcare-associated 

pneumonia with possible gram positive cocci and/or gram negative bacilli; 

sputum cx now growing MRSA; need to rule out C. diff. infection


HTN


ESRD on HD


dyslipidemia


morbid obesity with BMI 45 


positive HIV JEANNETTE test with negative HIV-1 PCR





Plan


continue Zyvox and continue Merrem (day 7 to complete 7-10 days of therapy); we 

have discontinued Doxycycline several days ago since it may worsen the liver 

failure; reviewed CT scan which showed the bilateral consolidations and 

infiltrates in the lungs; reviewed ultrasound of the abdomen which showed the 

fatty liver and hepatomegaly - liver enzymes are trending down and bilirubin as 

well


Patient has also been started on PO vancomycin and IV flagyl pending repeat 

stool for C. diff (day 3)


patient has history of multiple positive HIV JEANNETTE - follow up generation test 

and HIV-1 PCR


Follow up fungal and mycobacterial work up 


Overall prognosis is poor

## 2017-05-23 LAB
ADD MANUAL DIFF?: NO
ALBUMIN/GLOB SERPL: 0.8 {RATIO}
ALP SERPL-CCNC: 130 U/L
ALT SERPL-CCNC: 289 U/L
AMYLASE SERPL-CCNC: 233 U/L
AST SERPL-CCNC: 183 U/L
BASOPHILS # BLD AUTO: 0.04 K/MM3
BASOPHILS NFR BLD: 0.3 %
BILIRUB SERPL-MCNC: 2.6 MG/DL
BUN SERPL-MCNC: 38 MG/DL
CALCIUM SERPL-MCNC: 9.8 MG/DL
CHLORIDE SERPL-SCNC: 99 MMOL/L
CO2 SERPL-SCNC: 24 MMOL/L
COHGB MFR BLD: 2.7 %
COHGB MFR BLD: 3 %
COHGB MFR BLD: 3.1 %
COHGB MFR BLD: 3.3 %
EOSINOPHIL # BLD: 0.4 10*3/UL
EOSINOPHIL NFR BLD: 2.7 %
ERYTHROCYTE [DISTWIDTH] IN BLOOD BY AUTOMATED COUNT: 22.5 %
GLOBULIN SER-MCNC: 4.5 GM/DL
GLUCOSE SERPL-MCNC: 209 MG/DL
GRANULOCYTES # BLD: 11.7 10*3/UL
GRANULOCYTES NFR BLD: 75.9 %
HCO3 BLDA-SCNC: 21.4 MMOL/L
HCO3 BLDA-SCNC: 21.6 MMOL/L
HCO3 BLDA-SCNC: 24.8 MMOL/L
HCO3 BLDA-SCNC: 26.6 MMOL/L
HCT VFR BLD CALC: 24.8 %
HHB: 0.5 %
HHB: 1.5 %
HHB: 2.3 %
HHB: 2.6 %
LABORATORY COMMENT REPORT: (no result)
LYMPHOCYTES # BLD: 1.8 10*3/UL
LYMPHOCYTES NFR BLD AUTO: 11.6 %
MCH RBC QN AUTO: 29.9 PG
MCHC RBC AUTO-ENTMCNC: 33.1 G/DL
MCV RBC AUTO: 90.5 FL
METHGB MFR BLD: 2.6 %
METHGB MFR BLD: 2.9 %
METHGB MFR BLD: 2.9 %
METHGB MFR BLD: 3 %
MONOCYTES # BLD AUTO: 1.5 10*3/UL
MONOCYTES NFR BLD: 9.5 %
O2 CAP BLDA-SCNC: 10.6 ML/DL
O2 CAP BLDA-SCNC: 10.7 ML/DL
O2 CAP BLDA-SCNC: 10.9 ML/DL
O2 CAP BLDA-SCNC: 11 ML/DL
O2 CT BLDA-SCNC: 10.3 ML/DL
O2 CT BLDA-SCNC: 10.6 ML/DL
O2 CT BLDA-SCNC: 10.6 ML/DL
O2 CT BLDA-SCNC: 10.8 ML/DL
PH BLDA: 7.18 [PH]
PH BLDA: 7.24 [PH]
PH BLDA: 7.27 [PH]
PH BLDA: 7.38 [PH]
PLATELET # BLD: 125 10^3/UL
PMV BLD AUTO: 10.9 FL
PO2 BLDA: 334 MM/HG
PO2 BLDA: 76 MM/HG
PO2 BLDA: 91 MM/HG
PO2 BLDA: 92 MM/HG
POTASSIUM SERPL-SCNC: 4.1 MMOL/L
PROT SERPL-MCNC: 8 G/DL
SAO2 % BLDA: 91.2 %
SAO2 % BLDA: 91.6 %
SAO2 % BLDA: 92.5 %
SAO2 % BLDA: 94.2 %
SODIUM SERPL-SCNC: 136 MMOL/L
WBC # BLD AUTO: 15.4 10^3/UL

## 2017-05-23 PROCEDURE — 02HV33Z INSERTION OF INFUSION DEVICE INTO SUPERIOR VENA CAVA, PERCUTANEOUS APPROACH: ICD-10-PCS | Performed by: RADIOLOGY

## 2017-05-23 NOTE — PN
DATE: 05/23/2017



GI FOLLOWUP NOTE



Seen and examined at the bedside.  The patient remains on ventilator, waiting to get a PICC, weaning 
off sedation.



VITAL SIGNS:  Blood pressure 136/70, respirations 26, 100 on vent.



LABORATORY DATA:  Today, WBC is 15.4.  This is improved, hemoglobin 8.2.  Hematocrit is 24.8.  Platel
ets are 125.  Sodium 136, K 4.1, BUN 38.  Creatinine is 4.4.  Total bilirubin is 2.6, , ALT 28
9.  Alk phos is 130.  Amylase is 233.  Lipase is 1708.  This is improving.  Sputum is positive MRSA. 
 His stool culture is negative for Salmonella, Shigella, ____.  



The patient had a chest x-ray.  Report is pending.



PHYSICAL EXAMINATION:

HEENT:  Sclerae are ____.

NECK:  Supple.

CARDIAC:  S1 and S2.

LUNGS:  Positive rhonchi.

ABDOMEN:  Soft and obese.  It does not appear nontender.  No organomegaly.

NEUROLOGIC:  The patient is being weaned off sedation, but moves extremities.



ASSESSMENT:  This is a 61-year-old male with history of end-stage renal disease on dialysis, came wit
h acute liver failure of unknown etiology.  He is now positive methicillin-resistant Staphylococcus a
ureus and found to have acute pancreatitis.  His wounds are improving.  It could be pancreatitis.  Wo
uld have to consider if this is drug-induced ischemia or gallstones, but common bile duct is normal. 
 The patient also with bilateral pneumonia.



PLAN:  Remain on IV antibiotics.  He is on Flagyl, Zyvox, vancomycin oral.  Continue the Xifaxan.  He
 is on Protonix for GI prophylaxis, on lactulose, and on heparin.  Continue to follow closely.  The p
trae was seen and case discussed with Dr. Morales.





__________________________________________

Naya HINES







cc:



DD: 05/23/2017 12:25:05  451

TT: 05/23/2017 12:52:34

Confirmation # [id number]

Dictation # [id number]

helen

## 2017-05-23 NOTE — PN
DATE: 05/22/2017



REASON FOR CONSULTATION:  Status post respiratory failure, acute liver failure, borderline troponin p
ositive, end-stage renal disease.



BRIEF CLINICAL HISTORY:  This is a 61-year-old morbidly obese male with past medical history of diabe
aguilar, hypertension, hyperlipidemia, end-stage renal disease, on dialysis.  Cardiac catheterization on 
1/12/2017, nonobstructive coronary artery disease.  Admitted with ____ shortness of breath for 2 days
' duration.  Was on Zithromax by PMD as outpatient.  Admitted after having deterioration in respirato
ry distress requiring intubation.  Found to be acute liver failure with AST 7000.  Now awaiting for t
he transfer to [facility].  Now, the liver function is significantly improving.  Troponin 0.35, 0.75, 0.65, 
possibly secondary to end-stage renal disease and ____.  ____ MI.  



PHYSICAL EXAMINATION:  As follows:  

VITAL SIGNS:  Temperature afebrile, heart rate 64, blood pressure 100/40.

HEENT:  PERRLA, intact.

NECK:  Supple.  No carotid bruits.  No thyromegaly.

CHEST:  Clear to auscultation.

HEART:  S1, S2 regular.

ABDOMEN:  Soft.

EXTREMITIES:  Clubbing and cyanosis negative.  



Having dialysis.



BLOOD WORKUP:  As follows:  WBC 29.6, hemoglobin ____, hematocrit 23.5, platelet count 103.  Chemistr
y shows sodium 130, potassium 4, chloride 96, carbon dioxide 21, anion gap of ____, BUN ____, creatin
ine 6.1.



IMPRESSION:  Status post respiratory failure, acute liver failure.  AST improved, initially 7000, now
 is at 295.  End-stage renal disease, on dialysis.  Troponin borderline secondary to end-stage renal 
disease status post cardiac ____, nonobstructive coronary artery disease, leukocytosis, possibly seps
is, bilateral pulmonary infiltrate, respiratory failure on vent, acute liver failure improving, end-s
tage renal disease on dialysis, chronic anemia.



RECOMMENDATION:  Continue broad-spectrum antibiotic as per ID.  Avoid nephrotoxic.  Avoid medication 
that can worsen the liver function.  Liver function is improving.  Continue DVT prophylaxis.  The pat
ient responding to the painful stimuli.  ____ dialysis.  The patient also ____ to fingerstick for glu
cose check, wincing his hand.  We will follow with you.



Thank you, Dr. Rae, for providing the opportunity in taking care of the patient.  Try to wean off
 the vent as tolerated.





__________________________________________

Sunil Thompson MD







cc:



DD: 05/22/2017 14:43:09  305

TT: 05/22/2017 14:57:53

Confirmation # [id number]

Dictation # [id number]

sn

## 2017-05-23 NOTE — CP.PCM.PN
<Melissa Mendoza - Last Filed: 05/23/17 20:45>





Subjective





- Date & Time of Evaluation


Date of Evaluation: 05/23/17


Time of Evaluation: 19:00





- Subjective


Subjective: 





Code Blue note





62 yo male was found to have decreasing heart rate leading to PEA, code blue 

was called. ACLS protocol was started, patient received 1 dose epinephrine. AT 7

:01 ROSC. Patient was previously intubated, he was suctioned out thick 

secretions. Stat CXR, ABG, EKG were ordered. He was started on levophed. CXR 

showed pneumomediastinum and surgery was informed. Sodium chloride 7% inhalation

, duoneb breathing treatments, and versed were started. Patient recived stat 

solu- medrol dose. Vent settings adjusted per ABG. EKG was note changed from 

previous.  





Objective





- Vital Signs/Intake and Output


Vital Signs (last 24 hours): 


 











Temp Pulse Resp BP Pulse Ox


 


 99.2 F   111 H  20   190/85 H  100 


 


 05/23/17 16:00  05/23/17 19:05  05/23/17 19:05  05/23/17 19:05  05/23/17 19:05








Intake and Output: 


 











 05/23/17 05/24/17





 18:59 06:59


 


Intake Total 450 


 


Output Total 3100 


 


Balance -2650 














- Medications


Medications: 


 Current Medications





Albuterol/Ipratropium (Duoneb 3 Mg/0.5 Mg (3 Ml) Ud)  3 ml IH Q2H PRN


   PRN Reason: Shortness of Breath


   Last Admin: 05/22/17 20:32 Dose:  3 ml


Albuterol/Ipratropium (Duoneb 3 Mg/0.5 Mg (3 Ml) Ud)  3 ml IH [id number] [facility]


   Last Admin: 05/23/17 16:08 Dose:  3 ml


Heparin Sodium (Porcine) (Heparin)  5,000 units SC Q12 [facility]


   PRN Reason: Protocol


   Last Admin: 05/23/17 10:04 Dose:  5,000 units


Hydralazine HCl (Apresoline)  10 mg IVP Q6 PRN


   PRN Reason: SBP >180


   Last Admin: 05/17/17 02:05 Dose:  10 mg


Metronidazole (Flagyl)  500 mg in 100 mls @ 100 mls/hr IVPB Q8 [facility]


   PRN Reason: Protocol


   Last Admin: 05/23/17 17:56 Dose:  100 mls/hr


Linezolid (Zyvox 600mg/300ml D5w)  600 mg in 300 mls @ 200 mls/hr IVPB Q12 [facility]


   PRN Reason: Protocol


   Stop: 05/26/17 22:01


   Last Admin: 05/23/17 10:05 Dose:  200 mls/hr


Fentanyl Citrate (Fentanyl Citrate/Sodium Chloride 1 Mg/100 Ml)  1,000 mcg in 

100 mls @ 10 mls/hr IV .Q10H PRN; Protocol; 100 MCG/HR


   PRN Reason: TITRATE PER MD ORDER


   Last Titration: 05/22/17 15:00 Dose:  0 mcg/hr, 0 mls/hr


Dexmedetomidine HCl (Precedex 4 Mcg/Ml (100 Ml))  400 mcg in 100 mls @ 12.111 

mls/hr IV .Q8H16M PRN; Protocol; 0.4 MCG/KG/HR


   PRN Reason: Agitation


   Last Admin: 05/23/17 04:01 Dose:  0.4 mcg/kg/hr, 12.111 mls/hr


NOREPINEPHRINE BIT/0.9 % NACL (Levophed 4 Mg/ 250 Ml Ns Premixed)  4 mg in 250 

mls @ 15 mls/hr IV .[id number] PRN; Protocol; 4 MCG/MIN


   PRN Reason: TITRATE PER MD ORDER


Insulin Detemir (Levemir)  [facility]


   Last Admin: 05/23/17 10:06 Dose:  20 unit


Insulin Human Regular (Humulin R Med)  0 units SC Q4 [facility]


   Last Admin: 05/23/17 17:57 Dose:  Not Given


Lactulose (Enulose)  20 gm PO Q8H [facility]


   Last Admin: 05/23/17 18:19 Dose:  20 gm


Methylprednisolone (Solu-Medrol)  20 mg IVP Q12 [facility]


   Last Admin: 05/23/17 18:00 Dose:  20 mg


Midazolam HCl (Versed Inj)  5 mg IVP Q2H PRN


   PRN Reason: Sedation


Ondansetron HCl (Zofran Inj)  4 mg IVP Q6H PRN


   PRN Reason: Nausea/Vomiting


Pantoprazole Sodium (Protonix Inj)  40 mg IVP DAILY [facility]


   Last Admin: 05/23/17 10:04 Dose:  40 mg


Rifaximin (Xifaxan)  550 mg PO BID [facility]


   PRN Reason: Protocol


   Last Admin: 05/23/17 18:10 Dose:  550 mg


Sodium Chloride (Sodium Chloride 7% Inhalation)  4 ml IH Q4 [facility]


Vancomycin HCl (Vancocin 25 Mg/Ml (Oral Use))  500 mg PO QID [facility]


   PRN Reason: Protocol


   Last Admin: 05/23/17 18:06 Dose:  500 mg











- Labs


Labs: 


 





 05/23/17 05:45 





 05/23/17 05:45 





 











PT  12.6 Seconds (9.9-11.8)  H  05/22/17  10:00    


 


INR  1.17  (0.93-1.08)  H  05/22/17  10:00    


 


APTT  26.8 Seconds (23.7-30.8)   05/18/17  07:00    














- Constitutional


Appears: In Acute Distress





- Respiratory Exam


Respiratory Exam: Rhonchi


Additional comments: 





intubated 





- Cardiovascular Exam


Cardiovascular Exam: Tachycardia, REGULAR RHYTHM





- Neurological Exam


Neurological Exam: Awake





<Sang Forbes MD - Last Filed: 05/24/17 08:33>





Objective





- Vital Signs/Intake and Output


Vital Signs (last 24 hours): 


 











Temp Pulse Resp BP Pulse Ox


 


 98 F   95 H  31 H  124/56 L  100 


 


 05/24/17 04:00  05/24/17 07:01  05/24/17 07:27  05/24/17 07:01  05/24/17 07:27








Intake and Output: 


 











 05/24/17 05/24/17





 06:59 18:59


 


Intake Total 2650 25


 


Output Total 150 


 


Balance 2500 25














- Medications


Medications: 


 Current Medications





Albuterol/Ipratropium (Duoneb 3 Mg/0.5 Mg (3 Ml) Ud)  3 ml IH Q2H PRN


   PRN Reason: Shortness of Breath


   Last Admin: 05/23/17 21:25 Dose:  3 ml


Albuterol/Ipratropium (Duoneb 3 Mg/0.5 Mg (3 Ml) Ud)  3 ml IH [id number] [facility]


   Last Admin: 05/24/17 07:11 Dose:  3 ml


Heparin Sodium (Porcine) (Heparin)  5,000 units SC Q12 [facility]


   PRN Reason: Protocol


   Last Admin: 05/23/17 21:52 Dose:  5,000 units


Hydralazine HCl (Apresoline)  10 mg IVP Q6 PRN


   PRN Reason: SBP >180


   Last Admin: 05/17/17 02:05 Dose:  10 mg


Metronidazole (Flagyl)  500 mg in 100 mls @ 100 mls/hr IVPB Q8 [facility]


   PRN Reason: Protocol


   Last Admin: 05/24/17 05:04 Dose:  100 mls/hr


Linezolid (Zyvox 600mg/300ml D5w)  600 mg in 300 mls @ 200 mls/hr IVPB Q12 [facility]


   PRN Reason: Protocol


   Stop: 05/26/17 22:01


   Last Admin: 05/23/17 22:04 Dose:  200 mls/hr


Fentanyl Citrate (Fentanyl Citrate/Sodium Chloride 1 Mg/100 Ml)  1,000 mcg in 

100 mls @ 10 mls/hr IV .Q10H PRN; Protocol; 100 MCG/HR


   PRN Reason: TITRATE PER MD ORDER


   Last Titration: 05/22/17 15:00 Dose:  0 mcg/hr, 0 mls/hr


Dexmedetomidine HCl (Precedex 4 Mcg/Ml (100 Ml))  400 mcg in 100 mls @ 12.111 

mls/hr IV .Q8H16M PRN; Protocol; 0.4 MCG/KG/HR


   PRN Reason: Agitation


   Last Admin: 05/23/17 04:01 Dose:  0.4 mcg/kg/hr, 12.111 mls/hr


NOREPINEPHRINE BIT/0.9 % NACL (Levophed 4 Mg/ 250 Ml Ns Premixed)  4 mg in 250 

mls @ 15 mls/hr IV .O63K22H PRN; Protocol; 4 MCG/MIN


   PRN Reason: TITRATE PER MD ORDER


   Last Admin: 05/23/17 19:35 Dose:  15 mls/hr


Propofol (Diprivan)  1,000 mg in 100 mls @ 3.633 mls/hr IV .Q24H PRN; Protocol; 

5 MCG/KG/MIN


   PRN Reason: TITRATE PER MD ORDER


   Last Titration: 05/24/17 08:30 Dose:  0 mcg/kg/min, 0 mls/hr


Insulin Detemir (Levemir)  20 unit SC [facility]ILY [facility]


   Last Admin: 05/23/17 10:06 Dose:  20 unit


Insulin Human Regular (Humulin R Med)  0 units SC Q4 [facility]


   Last Admin: 05/24/17 08:00 Dose:  3 units


Lactulose (Enulose)  20 gm PO Q8H [facility]


   Last Admin: 05/24/17 03:11 Dose:  20 gm


Methylprednisolone (Solu-Medrol)  20 mg IVP Q12 [facility]


   Last Admin: 05/23/17 22:00 Dose:  Not Given


Midazolam HCl (Versed Inj)  5 mg IVP Q2H PRN


   PRN Reason: Sedation


   Last Admin: 05/24/17 00:50 Dose:  5 mg


Ondansetron HCl (Zofran Inj)  4 mg IVP Q6H PRN


   PRN Reason: Nausea/Vomiting


Pantoprazole Sodium (Protonix Inj)  40 mg IVP DAILY [facility]


   Last Admin: 05/23/17 10:04 Dose:  40 mg


Rifaximin (Xifaxan)  550 mg PO BID [facility]


   PRN Reason: Protocol


   Last Admin: 05/23/17 18:10 Dose:  550 mg


Sodium Chloride (Sodium Chloride 7% Inhalation)  4 ml IH [id number] [facility]


Vancomycin HCl (Vancocin 25 Mg/Ml (Oral Use))  500 mg PO QID [facility]


   PRN Reason: Protocol


   Last Admin: 05/23/17 22:01 Dose:  500 mg











- Labs


Labs: 


 





 05/23/17 05:45 





 05/24/17 06:45 





 











PT  12.6 Seconds (9.9-11.8)  H  05/22/17  10:00    


 


INR  1.17  (0.93-1.08)  H  05/22/17  10:00    


 


APTT  26.8 Seconds (23.7-30.8)   05/18/17  07:00    














Attending/Attestation





- Attestation


I have personally seen and examined this patient.: Yes


I have fully participated in the care of the patient.: Yes


I have reviewed all pertinent clinical information, including history, physical 

exam and plan: Yes


Notes (Text): 





05/24/17 08:33





-I agree with the above CODE BLUE note completed by the resident physician

## 2017-05-23 NOTE — CARD
--------------- APPROVED REPORT --------------





EKG Measurement

Heart [id number]

KS [id number]

[id number]

[id number]

[id number]



<Conclusion>

Sinus rhythm with premature atrial complexes

Nonspecific T wave abnormality

Prolonged QT

Abnormal ECG

## 2017-05-23 NOTE — CP.PCM.PCO
Physician Communication Note





- Physician Communication Note


Physician Communication Note: Diffuse AbdPain-Pancreatitis:Needs GB surgery 

post Pneumonia

## 2017-05-23 NOTE — PN
DATE: 05/23/2017



SUBJECTIVE:  The patient is currently seen in CCU, bed 6.  He remains intubated.  The patient is sche
duled for placement of a PICC line today, a short dialysis with ultrafiltration and possible extubati
on later today.  The patient was seen by general surgery who feels that he would benefit from a dayanna
cystectomy post resolution of his acute situation and MRSA pneumonia.



MEDICATIONS:  Medication list reviewed.  The patient is currently on hydralazine p.r.n., DuoNeb, Enul
ose, fentanyl, Flagyl, heparin, insulin, Levemir, Precedex p.r.n., Protonix, Solu-Medrol, oral vancom
ycin, Xifaxan, Zofran, and Zyvox.



OBJECTIVE:

INTAKE AND OUTPUT:  Intake 1674, output 2700.

VITAL SIGNS:  Blood pressure 132/50.  Respiratory rate is 26.  Pulse ox is 100%, pulse is 69.  The pa
jamil remains on an FIO2 of 40%.

HEENT:  Shows him to be intubated.  The patient is completely responsive, shaking his head appropriat
ely to all questions.  Conjunctivae are pale.  Sclerae are nonicteric.

NECK:  Supple, no neck vein distention.

CHEST:  Scattered rhonchi.  Diminished breath sounds at the bases.  No wheezing, no rales.

CARDIOVASCULAR:  Shows a normal S1, S2, mitral regurgitation/tricuspid regurgitation.  No rub.

ABDOMEN:  Mildly distended.  Mild obesity.  Bowel sounds are present.  No rebound, no guarding, no ma
sses appreciated.  

EXTREMITIES:  Show no lower extremity edema with his legs raised in bed.  He has a working AV fistula
, left upper extremity, positive thrill, positive bruit.



LABORATORY DATA AND IMAGING:  Chest, abdominal and pelvic CT scan again shows extensive bilateral inf
iltrates.  Abdominal and pelvic CT scan shows infiltration of the soft tissue of the pancreas suspici
ous for pancreatitis.  Gallbladder and bile ducts are unremarkable.  Liver was unremarkable.  Abdomin
al ultrasound of the gallbladder showed thickening of the gallbladder, inflammation and stones.  Lab:
  CBC:  White blood cell count down to 15.4 from 29.6.  Hemoglobin 8.2, platelet count is 125,000.  C
oags:  PT 12.6 with an INR of 1.17.  Blood gas from today, pH 7.27, pCO2 58 with a pO2 of 91.  Chemis
tries:  Normal electrolytes.  BUN 38 with a creatinine of 4.4, glucose 209.  Calcium is 9.8.  Last ph
osphorus was 8.1.  Bilirubin 2.6, AST is down to 183, ALT is down to 289.  Last ammonia level was nor
mal at 30 back on 5/18/2017.  Albumin is 3.4.  Amylase 233 down from 985.  Lipase down from over 20 1
000-1708.  Microbiology:  Sputum is positive for MRSA.  Stools are negative despite copious amounts o
f diarrhea.



ASSESSMENT:

1.  Respiratory failure in the setting of methicillin-resistant Staphylococcus aureus bilateral pneum
onia.  The patient will continue present ventilatory support with the help of perhaps extubation late
r today.  The patient will complete a course of IV antibiotic therapy.

2.  Diarrhea.  The patient is being treated empirically for Clostridium difficile colitis, pseudomemb
ranous colitis.  To date, cultures have all been negative.

3.  Acute pancreatitis and possible acute cholecystitis.  As per surgical note, patient will elective
ly need to have his gallbladder removed.  Pancreatitis appears to be resolving.

4.  Severe transaminitis in the setting of acute fulminant hepatitis.  This appears to be resolving n
icely.

5.  Improving leukocytosis.

6.  History of severe anemia.  The patient is status post 3 units of packed red blood cells.  Hemoglo
bin remains at 8.2 which is in the lower range of his baseline.

7.  End-stage renal disease.  With the hope of possible extubation later today, post-placement of a P
ICC line, the patient will likely receive a short dialysis treatment with ultrafiltration in order to
 facilitate a successful extubation.

8.  History of secondary hyperparathyroidism.  Phosphorus level remains elevated.  The patient will c
ontinue binder therapy post-extubation.  He had been on Auryxia.



PLAN:

1.  Plan for today is placement of a PICC line in order to obtain IV access, short dialysis with ultr
afiltration in order to facilitate a successful extubation with extubation to follow.  We will discus
s with intensivist the possibility of nutrition in light of the fact that the patient has been hospit
alized for 1 week.  NG tube feedings have not been started because of his pancreatitis.  Perhaps hype
ralimentation.

2.  Complete a course of antibiotic therapy for his MRSA pneumonia.

3.  Continue vancomycin and Flagyl for his diarrhea and continue to repeat stool samples for C. diff.
  

4.  In light of his tendency toward anemia, the patient can be transfused on dialysis which is the sa
fest way to give him blood products.

5.  Attempt extubation later today as per plan.

6.  Case discussed with the intensivist and CCU nursing staff in detail.



Greater than 35 minutes spent in the care of this patient.





__________________________________________

Martin Hardy MD







cc:



DD: 05/23/2017 13:15:23  [id number]

TT: 05/23/2017 13:49:40

Confirmation # [id number]

Dictation # [id number]

tn

## 2017-05-23 NOTE — PN
DATE: 05/23/2017



LOCATION:  CCU bed 6.



The patient has been slowly recovering from acute hepatic failure and new onset of pancreatitis with 
elevated amylase and lipase, as well as CAT scan findings.  The presumptive etiology for the pancreat
itis is the cholelithiasis.  The patient has not been getting feeding through the tube, but has been 
getting medication only.  During the same period of time, he developed a white count as high as 70,00
0 despite the fact that he is positive for HIV-1.



Over the last several days, there have been daily attempts at weaning him from the respirator, and to
day was no different except that by 4:00 and 4:30, he began to labor more and more difficulty and yumiko
aturating significantly and he was placed back on the respirator and still remained desaturated.  Unf
ortunately, this did not work, so he was manually bagged and a portable chest x-ray demonstrated a ve
ry large massive pneumomediastinum, most likely from barotrauma.  At this point in time, he is gettin
g a CAT scan, he is being ventilated and the GI tract is being decompressed; however, we have no thor
acic surgery back up at this point and looking to a level 1 trauma center where he can be transferred
 providing that he is stable enough to travel in the ambulance.



At this point, his prognosis is extremely grave, and without surgical intervention, there is a very h
igh likelihood of demise.  



The attending physician has been notified by this consultant and we will request the intensivist to l
ook for a trauma referral center.



This dictation will be electronically signed without being read.





__________________________________________

Miguel Allan MD







cc:



DD: 05/23/2017 20:23:41  [id number]

TT: 05/23/2017 22:05:02

Confirmation # [id number]

Dictation # [id number]

mn

## 2017-05-23 NOTE — VASCULAR
PROCEDURE:  Ultrasound and fluoroscopically placed right upper 

extremity PICC line.



HISTORY:

Sepsis.  Multi organ dysfunction. It IV access.  Needs PICC line.



PHYSICIAN(S):  Steven Mercedes MD.



TECHNIQUE:

The relative risks and indications of the procedure were explained to 

the patient and consent obtained. The patient was placed supine on 

the arteriogram table and the right arm prepped and draped in the 

usual sterile fashion. A tourniquet was applied to the right axilla.



1% Xylocaine was used to anesthetize the skin and soft tissues at the 

puncture site above the elbow. The right basilic vein was punctured 

under direct ultrasound guidance with a micropuncture set. A 0.018 

guidewire was advanced centrally and used to measure the length to 

the SVC/RA junction. A 5 Romanian single-lumen PICC line 45 cm long was 

advanced to the SVC/RA junction. The catheter was flushed and 

secured. The patient tolerated the procedure well.



IMPRESSION:

1. Ultrasound and fluoroscopically placed right upper extremity PICC 

line. A 5 Romanian single-lumen PICC line 45 cm long was advanced to 

the SVC/RA junction.

## 2017-05-23 NOTE — PN
DATE: 05/23/2017



REASON FOR CONSULTATION AND FOLLOWUP:  Status post respiratory failure, acute liver failure, borderli
ne troponin positive, end-stage renal disease, status post cardiac catheterization and nonobstructive
 CAD.



BRIEF CLINICAL HISTORY:  This is a 61-year-old morbidly obese male with past medical history signific
ant for diabetes, hypertension, hyperlipidemia, end-stage renal disease on dialysis, cardiac catheter
ization 1/12/2017, nonobstructive coronary artery disease, admitted with shortness of breath 2 days' 
direction, symptoms of upper respiratory tract infection, was on Zithromax at home, admitted with acu
te decompensated liver failure.  AST was 7000, respiratory failure.  Initial plan was to transferred 
to [facility].  Now, the liver function is significantly improved and almost at baseline.  The patient also 
shows the troponin positive secondary to end-stage renal disease.  Maximum troponin was 0.75.  Possib
ly, secondary to end-stage renal disease and stress and strain as patient had a cardiac catheterizati
on 1/12/2017, nonobstructive coronary artery disease.  Currently on vent, but responded to the painfu
l stimuli yesterday.



PHYSICAL EXAMINATION:

VITAL SIGNS:  Temperature afebrile, heart rate 69, blood pressure 132/50.

HEENT:  PERRLA.  Extraocular muscles intact.

NECK:  Supple.  No carotid bruits.  No thyromegaly.

CHEST:  Clear to auscultation.

HEART:  S1, S2 regular.

ABDOMEN:  Soft.

EXTREMITIES:  Clubbing and cyanosis negative.



LABORATORY DATA:  Blood workup as follows:  WBC 15.4, hemoglobin 8.2, hematocrit 24.8, platelet count
 125.  Chemistry shows sodium 130, potassium 4.0, chloride 90, carbon dioxide 24, anion gap of 17, BU
N 38, creatinine 4.4.



IMPRESSION:  Acute liver failure, significantly improved.  Now, the AST is 183, .  Admitting A
ST was 61533.  Morbid obesity, increased body mass index 44.4 kg/m2, end-stage renal disease on dialy
sis, status post cardiac catheterization 1/2017, nonobstructive coronary artery disease, respiratory 
failure, history of upper respiratory tract infection, was on Zithromax at home since 2 days prior to
 admission here.



RECOMMENDATION:  Continue vent management,, try to wean off the vent as tolerated, discontinue sedati
on.  Avoid any toxic medication for the liver hepatotoxic medication.  Continue p.r.n. hydralazine as
 needed.  Repeat echo was done on this admission that shows normal LV size, ejection fraction mildly 
reduced 45-50%, dilated RV, valve function moderately reduced.  Trace to mild aortic regurgitation, m
ild mitral regurg, mild to moderate tricuspid regurg, RV systolic pressure of 45, on vent.  CVS statu
s relatively stable, holding hemodynamically stable.  Surgical impression was also diffuse abdominal 
pain, pancreatitis, needs gallbladder surgery.  We will follow with you.



Thank you, Dr. Rae, for providing us the opportunity in taking care of the patient.



ADDENDUM:  The patient will be high risk for surgical intervention as recently suffered acute liver f
ailure, but if the patient need surgical intervention to save his life, the patient can go as high ri
sk procedure.  We will closely follow with you.  As the patient is hemodynamically stable and no evid
ence of acute MI, no evidence of ischemia, status post cardiac catheterization 1/12/2017, no evidence
 of arrhythmia, we will follow with you.





__________________________________________

Sunil Thompson MD







cc:



DD: 05/23/2017 10:01:00  [id number]

TT: 05/23/2017 10:36:25

Confirmation # [id number]

Dictation # [id number]

tn

## 2017-05-23 NOTE — CP.PCM.PN
Subjective





- Date & Time of Evaluation


Date of Evaluation: 05/23/17


Time of Evaluation: 10:10





- Subjective


Subjective: 





Still on the ventilator, but oxygen requirement is less, responds to questions 

when sedation is turned off. Afebrile overnight.





Objective





- Vital Signs/Intake and Output


Vital Signs (last 24 hours): 


 











Temp Pulse Resp BP Pulse Ox


 


 99.2 F   69   26 H  132/50 L  100 


 


 05/23/17 04:00  05/23/17 04:06  05/23/17 07:27  05/23/17 04:06  05/23/17 07:27








Intake and Output: 


 











 05/23/17 05/23/17





 06:59 18:59


 


Intake Total 964 


 


Output Total 400 


 


Balance 564 














- Medications


Medications: 


 Current Medications





Albuterol/Ipratropium (Duoneb 3 Mg/0.5 Mg (3 Ml) Ud)  3 ml IH Q2H PRN


   PRN Reason: Shortness of Breath


   Last Admin: 05/22/17 20:32 Dose:  3 ml


Albuterol/Ipratropium (Duoneb 3 Mg/0.5 Mg (3 Ml) Ud)  3 ml IH [id number] OSEAS


   Last Admin: 05/23/17 07:21 Dose:  3 ml


Heparin Sodium (Porcine) (Heparin)  5,000 units SC Q12 OSEAS


   PRN Reason: Protocol


   Last Admin: 05/22/17 21:32 Dose:  5,000 units


Hydralazine HCl (Apresoline)  10 mg IVP Q6 PRN


   PRN Reason: SBP >180


   Last Admin: 05/17/17 02:05 Dose:  10 mg


Metronidazole (Flagyl)  500 mg in 100 mls @ 100 mls/hr IVPB Q8 OSEAS


   PRN Reason: Protocol


   Last Admin: 05/23/17 05:17 Dose:  100 mls/hr


Linezolid (Zyvox 600mg/300ml D5w)  600 mg in 300 mls @ 200 mls/hr IVPB Q12 OSEAS


   PRN Reason: Protocol


   Stop: 05/26/17 22:01


   Last Admin: 05/22/17 21:35 Dose:  200 mls/hr


Fentanyl Citrate (Fentanyl Citrate/Sodium Chloride 1 Mg/100 Ml)  1,000 mcg in 

100 mls @ 10 mls/hr IV .Q10H PRN; Protocol; 100 MCG/HR


   PRN Reason: TITRATE PER MD ORDER


   Last Titration: 05/22/17 15:00 Dose:  0 mcg/hr, 0 mls/hr


Dexmedetomidine HCl (Precedex 4 Mcg/Ml (100 Ml))  400 mcg in 100 mls @ 12.111 

mls/hr IV .Q8H16M PRN; Protocol; 0.4 MCG/KG/HR


   PRN Reason: Agitation


   Last Admin: 05/23/17 04:01 Dose:  0.4 mcg/kg/hr, 12.111 mls/hr


Insulin Detemir (Levemir)  20 unit SC DAILY [facility]


   Last Admin: 05/22/17 12:25 Dose:  20 unit


Insulin Human Regular (Humulin R Med)  0 units SC Q4 OSEAS


   Last Admin: 05/23/17 00:00 Dose:  Not Given


Lactulose (Enulose)  20 gm PO Q8H [facility]


   Last Admin: 05/23/17 02:18 Dose:  20 gm


Ondansetron HCl (Zofran Inj)  4 mg IVP Q6H PRN


   PRN Reason: Nausea/Vomiting


Pantoprazole Sodium (Protonix Inj)  40 mg IVP DAILY [facility]


   Last Admin: 05/22/17 10:21 Dose:  40 mg


Rifaximin (Xifaxan)  550 mg PO BID [facility]


   PRN Reason: Protocol


   Last Admin: 05/22/17 18:24 Dose:  550 mg


Vancomycin HCl (Vancocin 25 Mg/Ml (Oral Use))  500 mg PO QID [facility]


   PRN Reason: Protocol


   Last Admin: 05/22/17 21:54 Dose:  500 mg











- Labs


Labs: 


 





 05/23/17 05:45 





 05/23/17 05:45 





 











PT  12.6 Seconds (9.9-11.8)  H  05/22/17  10:00    


 


INR  1.17  (0.93-1.08)  H  05/22/17  10:00    


 


APTT  26.8 Seconds (23.7-30.8)   05/18/17  07:00    














- Constitutional


Appears: Other (remains on the ventilator)





- Head Exam


Head Exam: NORMAL INSPECTION





- ENT Exam


Additional comments: 





ET tube in place





- Neck Exam


Neck Exam: absent: Lymphadenopathy, Meningismus





- Respiratory Exam


Respiratory Exam: Decreased Breath Sounds





- Cardiovascular Exam


Cardiovascular Exam: +S1, +S2





- GI/Abdominal Exam


GI & Abdominal Exam: Soft.  absent: Tenderness





Assessment and Plan





- Assessment and Plan (Free Text)


Plan: 





Assessment


Severe sepsis with ventilator-dependent respiratory failure and acute liver 

failure/acute fulminant hepatitis due to bilateral healthcare-associated 

pneumonia with possible gram positive cocci and/or gram negative bacilli; 

sputum cx now growing MRSA; need to rule out C. diff. infection


HTN


ESRD on HD


dyslipidemia


morbid obesity with BMI 45 


positive HIV JEANNETTE test with negative HIV-1 PCR





Plan


continue Zyvox and continue Merrem (day 8 to complete 7-10 days of therapy); we 

have discontinued Doxycycline several days ago since it may worsen the liver 

failure; reviewed CT scan which showed the bilateral consolidations and 

infiltrates in the lungs; reviewed ultrasound of the abdomen which showed the 

fatty liver and hepatomegaly - liver enzymes are trending down and bilirubin as 

well


Patient has also been started on PO vancomycin and IV flagyl pending repeat 

stool for C. diff (day 4)


patient has history of multiple positive HIV JEANNETTE, even the 4th generation 

test - HIV-1 PCR still negative - may be a false positive test 


Follow up fungal and mycobacterial work up 


Overall prognosis is guarded at best

## 2017-05-23 NOTE — PN
DATE: 05/23/2017



SUBJECTIVE:  The patient is a 61-year-old male, still sedated and intubated.  
He has end-stage renal disease on hemodialysis.  He was admitted with sepsis 
and fulminant liver failure, has bilateral lung infiltrates.  White count was 
elevated at 70k.  Flow cytometry on peripheral blood was ordered, which is 
negative.  BCR-ABL by PCR is still pending.  He is currently getting 
hemodialysis now.  He received several units of blood transfusion since 
admission for low hemoglobin.  He was also mildly coagulopathic on admission.  
He has severe sepsis currently on IV antibiotics.  



REVIEW OF SYSTEMS:  Could not be obtained.  The patient is sedated and 
intubated.  



PHYSICAL EXAMINATION:

GENERAL:  The patient is sedated and intubated.  

VITAL SIGNS:  Heart rate is 70 per minute, blood pressure 120/80, heart rate is 
90 per minute, respiratory rate 26 per minute, oxygen saturation 100% on vent.  

HEENT:  Normal.  Oral mucosa dry.  

NECK:  No lymphadenopathy. 

CHEST:  Air entry present, equal bilateral, bilateral conducted sounds present.
  

CARDIOVASCULAR:  S1, S2 normal, tachycardia plus.

ABDOMEN:  Tenderness, diffuse.  

EXTREMITIES:  No edema.  

CENTRAL NERVOUS SYSTEM:  Sedated and intubated.  

SKIN:  No breakdown.  No petechia, no rash.  

  

LABORATORY DATA:  White count 15,000 declined from 70,520, hemoglobin 8.2, 
hematocrit 24.8, platelet count 125.  Sodium 136, potassium 4.1, creatinine 4.4
, glucose 209, bilirubin 2.6, , .  



MEDICATIONS:  DuoNeb, fentanyl drip, heparin 5000 q. 12 hours, hydralazine 10 
mg q. 6 hours p.r.n., Levemir 20 mg subQ daily, lactulose 20 mg p.o. q. 8 hours
, Zyvox, Solu-Medrol 20 IV q. 12,  Flagyl 500 q. 8 hours, Zofran 4 mg q. 6 
hours p.r.n., Protonix 40 mg daily, rifaximin 550 mg p.o. b.i.d., vancomycin 
500 mg p.o. q.i.d.  

  

ASSESSMENT:  

1.  Leukocytosis.  

2.  Anemia.  

3.  Respiratory failure.  

4.  Sepsis.

5.  End-stage renal disease on hemodialysis.

6.  Acute hepatitis.  



PLAN:  

1.  Leukocytosis.  Flow cytometry and peripheral blood is negative.  White 
count is declining.  BCR-ABL is awaited by PCR.  Hemoglobin and hematocrit is 
stable now status post PRBC transfusion.  

2.  Severe sepsis, improving.  Currently on IV antibiotics.  Antibiotics are 
being tapered down.  

3.  End-stage renal disease, currently getting hemodialysis.  Nephrology 
following.  

4.  Respiratory failure, ventilator setting.  Oxygen saturation is better.  

5.  Continue DVT prophylaxis with heparin 5000 units subQ q. 12 hours.  



Thank you, Dr. Rae, for allowing us to participate in the patient's care.





__________________________________________

Saira Olivo MD







cc:   



DD: 05/23/2017 19:28:07  [id number]

TT: 05/23/2017 21:45:59

Confirmation # [id number]

Dictation # [id number]

an

MTDD

## 2017-05-23 NOTE — PN
DATE: 05/22/2017



ADDENDUM  



This is an addendum to the GI progress report dictated by RON Flannery.  



The patient still remains on ventilator.  Propofol drip have been discontinued _____ has also been di
scontinued.



PHYSICAL EXAMINATION:

VITAL SIGNS:  Temperature is 98.6, pulse 69, blood pressure 144/82.

ABDOMEN:  Distended, soft.  Tenderness present in the epigastric area.

LUNGS:  Bilateral air entry present with scattered rhonchi.

EXTREMITIES:  Mild edema present.

NEUROLOGIC:  On vent.  Responds to only painful stimuli.



The patient has pancreatitis, probably has gallstones, but CBD was normal.  Other differential diagno
sis should include _____ ischemia, drug-induced should be considered also. 





__________________________________________

Leydi Morales MD







cc:



DD: 05/22/2017 21:37:33  [id number]

TT: 05/23/2017 00:26:20

Confirmation # [id number]

Dictation # [id number]

tn



05/23/2017 07:16:04

## 2017-05-23 NOTE — PN
DATE: 05/23/2017



The patient seen and examined at bedside.  He is off of Precedex.  He is waking 
up.  He is comfortable.  He is on pressure support 5/5 and he is tolerating it 
very well; however, he does have a lot of sputum and he is wheezing.  Thus, I 
will proceed with frequent bronchodilators, frequent suction and a  small dose 
of steroids.



PHYSICAL EXAMINATION:

VITAL SIGNS:  Heart rate 83, blood pressure 122/52, respiratory rate 13, oxygen 
saturation 100%, FIO2 of 40%.

HEAD AND NECK:  Atraumatic.

LUNGS:  Wheezes bilaterally.

HEART:  Regular rate and rhythm.  S1, S2 distant.

ABDOMEN:  Soft, nontender, nondistended.

MUSCULOSKELETAL:  No C/C/E.

NEUROLOGIC:  The patient moves all extremities spontaneously.

SKIN:  Moist.

PSYCHIATRIC:  The patient is following commands easily.  Has good cough and gag 
reflex and muscle strength in both upper extremities.



LABORATORY DATA:  WBC 15.4, hemoglobin 8.2, platelet count 125.  Sodium 136, 
potassium 4.1, chloride 99, carbon dioxide 24, BUN 38, creatinine 4.4, glucose 
209.  , .  Serology reviewed.



ABG 7.27/58/91 on 40% FIO2 (then small dose of steroids started, 
bronchodilators given and frequent suctioning continued).



MEDICATIONS:  DuoNeb p.r.n. and every 4 hours on standing basis, Precedex, 
Flagyl, heparin subQ, Apresoline p.r.n., Levemir, lactulose, levofloxacin, Solu-
Medrol 20 mg IV q. 12, Zofran p.r.n., rifaximin, vancomycin p.o. and Zyvox.



ASSESSMENT AND PLAN:  This is a 61-year-old gentleman who is recovering from 
acute liver failure from unknown etiology.  Despite fairly detailed workup, it 
is unclear why lipase is so much elevated; however, it is also going down.  The 
patient has methicillin-resistant Staphylococcus aureus and currently is on 
linezolid.  He is tolerating pressure support 5/5 very well; however, still has 
signs of bronchospasm which has been treated with bronchodilators, low dose 
steroids and pulmonary toilet.

1.  Neurology:  The patient is awake, following commands.

2.  Pulmonary:  We will continue with pressure support trial and if 
bronchospasm resolves, we will consider extubation after HD.  Otherwise, we 
will proceed with daily weaning trials and sedation vacation.  We will continue 
with conservative fluid management.  The patient is on dialysis for his end-
stage renal disease.  We will continue with head of bed elevated more than 35 
degrees.  Low dose steroids will also help with minimizing laryngeal edema in 
this high risk patient.  The patient is on antibiotics for methicillin-
resistant Staphylococcus aureus pneumonia.

3.  Cardiovascular:  The patient is stable.  

4.  Infectious disease:  The patient is on linezolid for the methicillin-
resistant Staphylococcus aureus pneumonia.  Infectious disease service is 
following him as well.  Multiple viral studies did not point to any one 
etiology of his initial clinical compromise.  Leukocytosis is getting down and 
the patient is afebrile.

5.  Renal: The patient is getting dialysis 3 times a week.

6.  Endocrine:  We will continue to maintain blood glucose between 140-180 
range according to NICE-SUGAR trial.  

7.  We will continue to target euvolemia, euglycemia, normothermia and oxygen 
saturation more than 90%.  We will continue with deep venous thrombosis and 
gastrointestinal prophylaxis.



ccm time 40 min





__________________________________________

Joss Chandra MD







cc:   



DD: 05/23/2017 10:53:20  [id number]

TT: 05/23/2017 11:27:34

Confirmation # [id number]

Dictation # [id number]

tn

MTDD

## 2017-05-23 NOTE — US
HISTORY:

Cystic duct observation-pancreatitis



COMPARISON:

None.



TECHNIQUE:

Sonographic evaluation of the right upper quadrant of the abdomen.



FINDINGS:



LIVER:

Measures 17.9 cm in length. Mildly increased echogenicity of the 

liver parenchyma.  No mass. No intrahepatic bile duct dilatation. 



GALLBLADDER:

Unremarkable. No gallstones. 



COMMON BILE DUCT:

Measures 5 mm.  No stones. No dilatation.



PANCREAS:

Unremarkable as visualized. No mass. No ductal dilatation.



RIGHT KIDNEY:

Measures 10.0 cm in length. Normal echogenicity. No calculus, mass, 

or hydronephrosis.



AORTA:

No aneurysmal dilatation.



IVC:

Unremarkable.



OTHER FINDINGS:

None .



IMPRESSION:

Mild hepatomegaly with diffuse fatty infiltration.  No evidence of 

cholelithiasis or cholecystitis. No sonographic evidence of acute 

pancreatitis.

## 2017-05-24 LAB
ADD MANUAL DIFF?: NO
ALBUMIN/GLOB SERPL: 0.7 {RATIO}
ALP SERPL-CCNC: 122 U/L
ALT SERPL-CCNC: 223 U/L
AST SERPL-CCNC: 113 U/L
BASOPHILS # BLD AUTO: 0.02 K/MM3
BASOPHILS NFR BLD: 0.1 %
BILIRUB SERPL-MCNC: 2.3 MG/DL
BUN SERPL-MCNC: 55 MG/DL
CALCIUM SERPL-MCNC: 10 MG/DL
CHLORIDE SERPL-SCNC: 100 MMOL/L
CO2 SERPL-SCNC: 22 MMOL/L
COHGB MFR BLD: 2.3 %
EOSINOPHIL # BLD: 0 10*3/UL
EOSINOPHIL NFR BLD: 0.3 %
ERYTHROCYTE [DISTWIDTH] IN BLOOD BY AUTOMATED COUNT: 22.1 %
GLOBULIN SER-MCNC: 4.8 GM/DL
GLUCOSE SERPL-MCNC: 231 MG/DL
GRANULOCYTES # BLD: 13.7 10*3/UL
GRANULOCYTES NFR BLD: 86.4 %
HCO3 BLDA-SCNC: 17.6 MMOL/L
HCT VFR BLD CALC: 22.8 %
HHB: 0.6 %
LIPASE SERPL-CCNC: 778 U/L
LYMPHOCYTES # BLD: 1.2 10*3/UL
LYMPHOCYTES NFR BLD AUTO: 7.4 %
Lab: (no result)
MAGNESIUM SERPL-MCNC: 2.2 MG/DL
MCH RBC QN AUTO: 31.2 PG
MCHC RBC AUTO-ENTMCNC: 33.8 G/DL
MCV RBC AUTO: 92.3 FL
METHGB MFR BLD: 2.1 %
MONOCYTES # BLD AUTO: 0.9 10*3/UL
MONOCYTES NFR BLD: 5.8 %
O2 CAP BLDA-SCNC: 10.2 ML/DL
O2 CT BLDA-SCNC: 10.1 ML/DL
PH BLDA: 7.24 [PH]
PHOSPHATE SERPL-MCNC: 9.1 MG/DL
PLATELET # BLD: 111 10^3/UL
PMV BLD AUTO: 9.7 FL
PO2 BLDA: 178 MM/HG
POTASSIUM SERPL-SCNC: 4.6 MMOL/L
PROT SERPL-MCNC: 8.2 G/DL
SAO2 % BLDA: 95 %
SODIUM SERPL-SCNC: 138 MMOL/L
SPECIMEN SOURCE: (no result)
T(9;22)(ABL1,BCR) BLD/T: (no result)
T(9;22)(ABL1,BCR) MAJOR BLD/T QL: NOT DETECTED
T(9;22)(ABL1,BCR) MINOR BLD/T QL: NOT DETECTED
T(ABL1,BCR)P210/CONTROL (IS) BLD/T: 0 %
T(ABL1,BCR)P210/CONTROL BLD/T: 0 %
WBC # BLD AUTO: 15.9 10^3/UL

## 2017-05-24 PROCEDURE — 06HM33Z INSERTION OF INFUSION DEVICE INTO RIGHT FEMORAL VEIN, PERCUTANEOUS APPROACH: ICD-10-PCS

## 2017-05-24 PROCEDURE — 5A09457 ASSISTANCE WITH RESPIRATORY VENTILATION, 24-96 CONSECUTIVE HOURS, CONTINUOUS POSITIVE AIRWAY PRESSURE: ICD-10-PCS | Performed by: INTERNAL MEDICINE

## 2017-05-24 PROCEDURE — 0BC18ZZ EXTIRPATION OF MATTER FROM TRACHEA, VIA NATURAL OR ARTIFICIAL OPENING ENDOSCOPIC: ICD-10-PCS | Performed by: INTERNAL MEDICINE

## 2017-05-24 NOTE — PN
DATE: 05/24/2017



The patient seen and examined at bedside.  He is off sedation.  He just 
received dialysis and tolerated pressure support trial for a long period of 
time.  He just got bronchoscopy, which revealed multiple and tenacious mucous 
plugs in the endotracheal tube, which made bronchoscopy suboptimal.  Some 
mucous plugs were removed.  The patient just got extubated to BiPAP.  A lot of 
mucous plug attached to biofilm were noted in the endotracheal tube.



PHYSICAL EXAMINATION:

VITAL SIGNS:  Heart rate 93, respiratory rate 25, blood pressure 168/76, oxygen 
saturation 100% on 40% FiO2.

HEAD AND NECK:  Atraumatic.

LUNGS:  Few wheezes bilaterally, but much better than yesterday.

HEART:  Regular rate and rhythm.  S1, S2 normal.

ABDOMEN:  Soft, nontender, nondistended.

MUSCULOSKELETAL:  No C/C/E.

NEUROLOGIC:  The patient moves all extremities spontaneously.

SKIN:  Moist.

PSYCHIATRIC:  The patient is alert and oriented and awake.



LABORATORIES:  WBC 15.9, hemoglobin 7.7, platelet count 111.  Sodium 138, 
potassium 4.6, chloride 100, BUN 55, creatinine 6.3 (the patient is on dialysis)
, glucose 231, , .  Lipase 778, down from 1708.



MEDICATIONS:  DuoNeb p.r.n. and every 4 hours, heparin subQ, hydralazine p.r.n.
, Levemir, regular insulin sliding scale medium protocol, lactulose, Solu-
Medrol 20 mg IV q. 12, Versed p.r.n., Zofran p.r.n., Protonix, hypertonic 
saline inhalers every 4 hours, linezolid.



ASSESSMENT AND PLAN:  This is a 61-year-old gentleman who is recovering from 
acute liver injury and methicillin-resistant Staphylococcus aureus community-
acquired pneumonia.  The patient tolerated pressure support trial and got 
extubated to BiPAP.  He is doing very well.  He is alert, awake, no signs of 
hepatic encephalopathy.  He has tolerated nasal cannula.  However, out of 
abundance of precaution was put on BiPAP as patient likely has obstructive 
sleep apnea and potentially obesity hypoventilation syndrome.  A small dose of 
steroids were given as patient would be considered high risk for laryngeal 
edema after 7 days of ET.  The patient continued to be on antibiotics directed 
toward methicillin-resistant Staphylococcus aureus (linezolid).  ID service is 
following him.  He underwent dialysis today.  We will continue to target 
euvolemia, euglycemia, normothermia and oxygen saturation more than 90%.  Deep 
venous thrombosis and gastrointestinal prophylaxis.



ccm time 40 min





__________________________________________

Joss Chandra MD







cc:   



DD: 05/24/2017 15:31:53  [id number]

TT: 05/24/2017 17:44:55

Confirmation # [id number]

Dictation # [id number]

en

MTDD

## 2017-05-24 NOTE — PCM.PROC
<Melissa Mendoza - Last Filed: 05/24/17 02:46>





Procedures


Attestation:: I certify that I have explained the specified Operation(s) or 

Procedure(s), risks, benefits and reasonable alternatives to the Patient and/or 

other person responsible. The opportunity was given to ask questions and all 

questions answered





- Central Line Placement


  ** Right Femoral Triple Lumen Catheter


Aseptic technique was employed throughout the procedure: Hand Hygiene done 

prior to procedure, Full sterile barriers (mask, hair cover, sterile gown, 

sterile gloves), Full body sterile drape, Chloraprep Antiseptic: 2 minute prep 

for Femoral


CVP Time Out Performed: Yes


Pt. Placed on Pulse Ox Monitor: Yes


Central Line Prep: Povidone-Iodine 1%


Local Anesthesia Used: Lidocaine 1%


Ultrasound Used for Placement: Yes


Immediate Complications: None


Additional Comments: 





Vein was canalized, however wire could not advance. Central venous line was not 

able to be placed.  





<Sang Forbes MD - Last Filed: 05/24/17 08:38>





Attending/Attestation





- Attestation


I have personally seen and examined this patient.: Yes


I have fully participated in the care of the patient.: Yes


I have reviewed all pertinent clinical information, including history, physical 

exam and plan: Yes


Notes (Text): 





05/24/17 08:34








-I agree with the above procedure note completed by the resident physician.


-Briefly, with the assistance of the general surgery resident, an attempt was 

made to place a right femoral central venous line overnight. Despite multiple 

attempts, the wire failed to advance and became kinked each time. As a result, 

ultimately no central line was placed.

## 2017-05-24 NOTE — CP.PCM.PN
Subjective





- Date & Time of Evaluation


Date of Evaluation: 05/24/17


Time of Evaluation: 10:10





- Subjective


Subjective: 





Continues to be on the ventilator, sedated. Afebrile overnight.





Objective





- Vital Signs/Intake and Output


Vital Signs (last 24 hours): 


 











Temp Pulse Resp BP Pulse Ox


 


 99 F   92 H  20   165/73 H  100 


 


 05/24/17 16:00  05/24/17 17:09  05/24/17 17:09  05/24/17 17:10  05/24/17 16:19








Intake and Output: 


 











 05/24/17 05/24/17





 06:59 18:59


 


Intake Total 2650 65


 


Output Total 150 3100


 


Balance 2500 -3035














- Medications


Medications: 


 Current Medications





Albuterol/Ipratropium (Duoneb 3 Mg/0.5 Mg (3 Ml) Ud)  3 ml IH Q2H PRN


   PRN Reason: Shortness of Breath


   Last Admin: 05/23/17 21:25 Dose:  3 ml


Albuterol/Ipratropium (Duoneb 3 Mg/0.5 Mg (3 Ml) Ud)  3 ml IH [id number] [facility]


   Last Admin: 05/24/17 11:11 Dose:  3 ml


Heparin Sodium (Porcine) (Heparin)  5,000 units SC Q12 [facility]


   PRN Reason: Protocol


   Last Admin: 05/24/17 09:23 Dose:  5,000 units


Hydralazine HCl (Apresoline)  10 mg IVP Q6 PRN


   PRN Reason: SBP >180


   Last Admin: 05/17/17 02:05 Dose:  10 mg


Metronidazole (Flagyl)  500 mg in 100 mls @ 100 mls/hr IVPB Q8 [facility]


   PRN Reason: Protocol


   Last Admin: 05/24/17 14:48 Dose:  100 mls/hr


Linezolid (Zyvox 600mg/300ml D5w)  600 mg in 300 mls @ 200 mls/hr IVPB Q12 [facility]


   PRN Reason: Protocol


   Stop: 05/26/17 22:01


   Last Admin: 05/24/17 13:14 Dose:  200 mls/hr


Fentanyl Citrate (Fentanyl Citrate/Sodium Chloride 1 Mg/100 Ml)  1,000 mcg in 

100 mls @ 10 mls/hr IV .Q10H PRN; Protocol; 100 MCG/HR


   PRN Reason: TITRATE PER MD ORDER


   Last Titration: 05/22/17 15:00 Dose:  0 mcg/hr, 0 mls/hr


Dexmedetomidine HCl (Precedex 4 Mcg/Ml (100 Ml))  400 mcg in 100 mls @ 12.111 

mls/hr IV .Q8H16M PRN; Protocol; 0.4 MCG/KG/HR


   PRN Reason: Agitation


   Last Admin: 05/23/17 04:01 Dose:  0.4 mcg/kg/hr, 12.111 mls/hr


NOREPINEPHRINE BIT/0.9 % NACL (Levophed 4 Mg/ 250 Ml Ns Premixed)  4 mg in 250 

mls @ 15 mls/hr IV .[id number] PRN; Protocol; 4 MCG/MIN


   PRN Reason: TITRATE PER MD ORDER


   Last Admin: 05/23/17 19:35 Dose:  15 mls/hr


Propofol (Diprivan)  1,000 mg in 100 mls @ 3.633 mls/hr IV .Q24H PRN; Protocol; 

5 MCG/KG/MIN


   PRN Reason: TITRATE PER MD ORDER


   Last Titration: 05/24/17 08:30 Dose:  0 mcg/kg/min, 0 mls/hr


Insulin Detemir (Levemir)  20 unit SC DAILY [facility]


   Last Admin: 05/24/17 09:23 Dose:  20 unit


Insulin Human Regular (Humulin R Med)  0 units SC Q4 [facility]


   Last Admin: 05/24/17 16:32 Dose:  5 units


Lactulose (Enulose)  20 gm PO Q8H [facility]


   Last Admin: 05/24/17 13:20 Dose:  20 gm


Methylprednisolone (Solu-Medrol)  20 mg IVP Q12 [facility]


   Last Admin: 05/24/17 09:22 Dose:  20 mg


Midazolam HCl (Versed Inj)  5 mg IVP Q2H PRN


   PRN Reason: Sedation


   Last Admin: 05/24/17 00:50 Dose:  5 mg


Ondansetron HCl (Zofran Inj)  4 mg IVP Q6H PRN


   PRN Reason: Nausea/Vomiting


Pantoprazole Sodium (Protonix Inj)  40 mg IVP DAILY [facility]


   Last Admin: 05/24/17 09:22 Dose:  40 mg


Rifaximin (Xifaxan)  550 mg PO BID [facility]


   PRN Reason: Protocol


   Last Admin: 05/24/17 09:23 Dose:  550 mg


Sodium Chloride (Sodium Chloride 7% Inhalation)  4 ml IH [id number] [facility]


   Last Admin: 05/24/17 15:25 Dose:  Not Given


Vancomycin HCl (Vancocin 25 Mg/Ml (Oral Use))  500 mg PO QID [facility]


   PRN Reason: Protocol


   Last Admin: 05/24/17 13:17 Dose:  500 mg











- Labs


Labs: 


 





 05/24/17 06:45 





 05/24/17 06:45 





 











PT  12.6 Seconds (9.9-11.8)  H  05/22/17  10:00    


 


INR  1.17  (0.93-1.08)  H  05/22/17  10:00    


 


APTT  26.8 Seconds (23.7-30.8)   05/18/17  07:00    














- Constitutional


Appears: Other (Intubated and sedated)





- Head Exam


Head Exam: NORMAL INSPECTION





- ENT Exam


Additional comments: 





ET tube in place





- Neck Exam


Neck Exam: absent: Lymphadenopathy, Meningismus





- Respiratory Exam


Respiratory Exam: Decreased Breath Sounds





- Cardiovascular Exam


Cardiovascular Exam: +S1, +S2





- GI/Abdominal Exam


GI & Abdominal Exam: Soft.  absent: Tenderness





Assessment and Plan





- Assessment and Plan (Free Text)


Plan: 





Assessment


Severe sepsis with ventilator-dependent respiratory failure and acute liver 

failure/acute fulminant hepatitis due to bilateral healthcare-associated 

pneumonia with sputum cx now growing MRSA; need to rule out C. diff. infection


HTN


ESRD on HD


dyslipidemia


morbid obesity with BMI 45 


positive HIV JEANNETTE test with negative HIV-1 PCR





Plan


continue Zyvox and continue Merrem (day 9 to complete 7-10 days of therapy);  

reviewed CT scan which showed the bilateral consolidations and infiltrates in 

the lungs; reviewed ultrasound of the abdomen which showed the fatty liver and 

hepatomegaly - liver enzymes are trending down and bilirubin as well


continue PO vancomycin and IV flagyl (day 5)


patient has history of multiple positive HIV JEANNETTE, even the 4th generation 

test - HIV-1 PCR still negative - may be a false positive test 


Follow up fungal and mycobacterial work up 


Overall prognosis is guarded at best

## 2017-05-24 NOTE — RAD
HISTORY:

r/o pnemo  



COMPARISON:

No prior. 



FINDINGS:



LUNGS:

Perihilar infiltrate on the right



PLEURA:

There is a left-sided pneumo mediastinum which is slightly smaller in 

size from the earlier study same day



CARDIOVASCULAR:

Normal.



OSSEOUS STRUCTURES:

No significant abnormalities.



VISUALIZED UPPER ABDOMEN:

Normal.



OTHER FINDINGS:

Central lines and tubes in satisfactory position



IMPRESSION:

Left-sided pneumo mediastinum decreased in size

## 2017-05-24 NOTE — CP.PCM.PN
<Parmjit Tirado - Last Filed: 05/24/17 09:46>





Subjective





- Date & Time of Evaluation


Date of Evaluation: 05/24/17


Time of Evaluation: 09:46





- Subjective


Subjective: 





GI for Dr. Morales





Pt s&e with attending. Pt had code blue yesterday with bradycardia. CXR shows 

pneumomediastinum. Pt is currently intubated. Off sedation. Response to pain 

stimuli. Has rectal tube. 





Objective





- Vital Signs/Intake and Output


Vital Signs (last 24 hours): 


 











Temp Pulse Resp BP Pulse Ox


 


 98 F   95 H  31 H  124/56 L  100 


 


 05/24/17 04:00  05/24/17 07:01  05/24/17 07:27  05/24/17 07:01  05/24/17 07:27








Intake and Output: 


 











 05/24/17 05/24/17





 06:59 18:59


 


Intake Total 2650 25


 


Output Total 150 


 


Balance 2500 25














- Medications


Medications: 


 Current Medications





Albuterol/Ipratropium (Duoneb 3 Mg/0.5 Mg (3 Ml) Ud)  3 ml IH Q2H PRN


   PRN Reason: Shortness of Breath


   Last Admin: 05/23/17 21:25 Dose:  3 ml


Albuterol/Ipratropium (Duoneb 3 Mg/0.5 Mg (3 Ml) Ud)  3 ml IH [id number] [facility]


   Last Admin: 05/24/17 07:11 Dose:  3 ml


Heparin Sodium (Porcine) (Heparin)  5,000 units SC Q12 [facility]


   PRN Reason: Protocol


   Last Admin: 05/24/17 09:23 Dose:  5,000 units


Hydralazine HCl (Apresoline)  10 mg IVP Q6 PRN


   PRN Reason: SBP >180


   Last Admin: 05/17/17 02:05 Dose:  10 mg


Metronidazole (Flagyl)  500 mg in 100 mls @ 100 mls/hr IVPB Q8 [facility]


   PRN Reason: Protocol


   Last Admin: 05/24/17 05:04 Dose:  100 mls/hr


Linezolid (Zyvox 600mg/300ml D5w)  600 mg in 300 mls @ 200 mls/hr IVPB Q12 [facility]


   PRN Reason: Protocol


   Stop: 05/26/17 22:01


   Last Admin: 05/23/17 22:04 Dose:  200 mls/hr


Fentanyl Citrate (Fentanyl Citrate/Sodium Chloride 1 Mg/100 Ml)  1,000 mcg in 

100 mls @ 10 mls/hr IV .Q10H PRN; Protocol; 100 MCG/HR


   PRN Reason: TITRATE PER MD ORDER


   Last Titration: 05/22/17 15:00 Dose:  0 mcg/hr, 0 mls/hr


Dexmedetomidine HCl (Precedex 4 Mcg/Ml (100 Ml))  400 mcg in 100 mls @ 12.111 

mls/hr IV .Q8H16M PRN; Protocol; 0.4 MCG/KG/HR


   PRN Reason: Agitation


   Last Admin: 05/23/17 04:01 Dose:  0.4 mcg/kg/hr, 12.111 mls/hr


NOREPINEPHRINE BIT/0.9 % NACL (Levophed 4 Mg/ 250 Ml Ns Premixed)  4 mg in 250 

mls @ 15 mls/hr IV .[id number] PRN; Protocol; 4 MCG/MIN


   PRN Reason: TITRATE PER MD ORDER


   Last Admin: 05/23/17 19:35 Dose:  15 mls/hr


Propofol (Diprivan)  1,000 mg in 100 mls @ 3.633 mls/hr IV .Q24H PRN; Protocol; 

5 MCG/KG/MIN


   PRN Reason: TITRATE PER MD ORDER


   Last Titration: 05/24/17 08:30 Dose:  0 mcg/kg/min, 0 mls/hr


Insulin Detemir (Levemir)  20 unit SC DAILY [facility]


   Last Admin: 05/24/17 09:23 Dose:  20 unit


Insulin Human Regular (Humulin R Med)  0 units SC Q4 [facility]


   Last Admin: 05/24/17 08:00 Dose:  3 units


Lactulose (Enulose)  20 gm PO Q8H [facility]


   Last Admin: 05/24/17 03:11 Dose:  20 gm


Methylprednisolone (Solu-Medrol)  20 mg IVP Q12 [facility]


   Last Admin: 05/24/17 09:22 Dose:  20 mg


Midazolam HCl (Versed Inj)  5 mg IVP Q2H PRN


   PRN Reason: Sedation


   Last Admin: 05/24/17 00:50 Dose:  5 mg


Ondansetron HCl (Zofran Inj)  4 mg IVP Q6H PRN


   PRN Reason: Nausea/Vomiting


Pantoprazole Sodium (Protonix Inj)  40 mg IVP DAILY [facility]


   Last Admin: 05/24/17 09:22 Dose:  40 mg


Rifaximin (Xifaxan)  550 mg PO BID [facility]


   PRN Reason: Protocol


   Last Admin: 05/24/17 09:23 Dose:  550 mg


Sodium Chloride (Sodium Chloride 7% Inhalation)  4 ml IH [id number] [facility]


Vancomycin HCl (Vancocin 25 Mg/Ml (Oral Use))  500 mg PO QID [facility]


   PRN Reason: Protocol


   Last Admin: 05/24/17 09:24 Dose:  500 mg











- Labs


Labs: 


 





 05/24/17 06:45 





 05/24/17 06:45 





 











PT  12.6 Seconds (9.9-11.8)  H  05/22/17  10:00    


 


INR  1.17  (0.93-1.08)  H  05/22/17  10:00    


 


APTT  26.8 Seconds (23.7-30.8)   05/18/17  07:00    














- Constitutional


Appears: In Acute Distress, Unkempt





- Head Exam


Head Exam: ATRAUMATIC, NORMAL INSPECTION, NORMOCEPHALIC





- Eye Exam


Eye Exam: PERRL


Pupil Exam: NORMAL ACCOMODATION





- Respiratory Exam


Respiratory Exam: Respiratory Distress


Additional comments: 





Intubated





- Cardiovascular Exam


Cardiovascular Exam: REGULAR RHYTHM, +S1, +S2.  absent: Murmur





- GI/Abdominal Exam


GI & Abdominal Exam: Soft, Tenderness, Normal Bowel Sounds.  absent: Distended, 

Firm





- Rectal Exam


Additional comments: 





Rectal tube in place





- Neurological Exam


Neurological Exam: absent: Oriented x3





- Skin


Skin Exam: Dry, Intact, Normal Color, Warm





Assessment and Plan





- Assessment and Plan (Free Text)


Assessment: 





61 M w PMD ESRD on HD , DM , CAD, GERD came with vomiting and respiratory 

distress. Pt is intubated. found to have elevated LFT and ammonia (63->29). 


Hep panel is negative. On initial labs MELD score: 32 -> 50% 3month mortality. 


Differential dx: Acetominophen induced acute hepatic failure , hepatic 

encephalopathy. 


Liver failure with Unknown etiology: LFT, Lipase trending down 





Plan


-ABX per ID 


-Trend LFT 


-ICU management 


-We will continue to follow closely 





Case Discussed with Dr. Morales 





<Leydi Morales - Last Filed: 07/12/17 14:39>





Objective





- Vital Signs/Intake and Output


Vital Signs (last 24 hours): 


 











Temp Pulse Resp BP Pulse Ox


 


 97.9 F   72   18   130/60   99 


 


 06/02/17 07:30  06/02/17 07:30  06/02/17 07:30  06/02/17 07:30  06/02/17 07:30











- Labs


Labs: 


 





 06/02/17 11:42 





 06/02/17 11:42 





 











PT  11.7 Seconds (9.9-11.8)   05/31/17  12:30    


 


INR  1.08  (0.93-1.08)   05/31/17  12:30    


 


APTT  23.1 Seconds (23.7-30.8)  L  05/31/17  12:30    














Assessment and Plan





- Assessment and Plan (Free Text)


Assessment: 





The patient was seen and examined at bedside.  The chart was reviewed.   Last 

24 hours events reviewed.  Agreed with the above findings and  treatment plan 

as outlined in 's note

## 2017-05-24 NOTE — RAD
HISTORY:

code blue  



COMPARISON:

Earlier same day 



FINDINGS:



LUNGS:

Right-sided perihilar infiltrate



PLEURA:

There is a left-sided pneumo mediastinum



CARDIOVASCULAR:

Moderate cardiomegaly



OSSEOUS STRUCTURES:

No significant abnormalities.



VISUALIZED UPPER ABDOMEN:

Normal.



OTHER FINDINGS:

Central lines and tubes unchanged



IMPRESSION:

Left-sided pneumo mediastinum

## 2017-05-24 NOTE — CON
DATE: 05/23/2017



This patient was seen and evaluated earlier today.  This is an addendum to the GI progress report dic
tated by Naya Ruiz NP.  The patient remains on vent.  Lipase is showing downward trend.  Pancreati
c enzyme shows downward trend.  Continue the antibiotics as per ID.  The etiology for acute liver monae
lure remains unclear.  Sepsis, pneumonia, pancreatitis.  CBD appeared normal size.  We will continue 
to closely follow up the patient.



Thank you very much for allowing us to participate in the care of the patient.





__________________________________________

Leydi Morales MD







cc:



DD: 05/23/2017 23:40:34  [id number]

TT: 05/24/2017 09:31:29

Confirmation # [id number]

Dictation # [id number]

tn

## 2017-05-24 NOTE — PN
DATE: 05/24/2017



SUBJECTIVE:  The patient is a 61-year-old, seen and examined.  Remains sedated on vent, getting dialy
sis and weaning trial was given last night, but patient became restless. I discussed with the intensi
vist. There was one big mucus plug.  He had bedside bronchoscopy done and mucus plug was removed and 
he is getting _____ trial again.



PHYSICAL EXAMINATION: 

VITAL SIGNS:  He is afebrile, pulse 92, respirations 20, blood pressure 160/73.

LUNGS:  Bilateral fair airflow, no rhonchi or crackle.

HEART:  S1, S2 audible.

ABDOMEN:  Soft, obese, nontender, no rebound, no guarding.

NEUROLOGIC:  The patient is sedated.



LABORATORY EXAMINATION:  WBC is 15.9, hemoglobin 7.7, hematocrit 22.8, platelet of 111.  Chemistry:  
Sodium 138, potassium 4.6, chloride 100, CO2 21, BUN 55, creatinine 6.3, blood sugar of 231. There wa
s a suspicion for pneumomediastinum, but repeat x-ray seems to be unremarkable. Either it was positio
nal or it was a limited leak, did not need any intervention.



ASSESSMENT AND PLAN:

1.  Respiratory failure.

2.  Acute renal, hepatic failure improving.

3.  End-stage renal disease on hemodialysis.

4.  _____ insulin-dependent diabetes.

5.  Respiratory insufficiency.  



PLAN:

Extubate him today.  He is being weaned off after he finishes dialysis, which will be attempted to ex
tubate him.





__________________________________________

Antonieta Rae MD







cc:



DD: 05/24/2017 18:21:15  [id number]

TT: 05/24/2017 18:54:18

Confirmation # [id number]

Dictation # [id number]

ln

## 2017-05-24 NOTE — PN
DATE: 05/24/2017



REASON FOR CONSULTATION AND FOLLOWUP:  Status post respiratory failure, acute liver failure, improved
, borderline troponin positive _____ end-stage renal disease, status post cardiac catheterization, no
nobstructive coronary artery disease.



BRIEF CLINICAL HISTORY:  This is a 61-year-old morbidly obese male with a past medical history signif
icant for diabetes, hypertension, hyperlipidemia, end-stage renal disease on dialysis, cardiac cathet
erization 1/12/2017 nonobstructive coronary artery disease.  Admitted with shortness of breath, 2 day
s, symptoms of upper respiratory tract infection on Zithromax at home.  Admitted with acute decompens
ated congestive heart failure, AST was 7000, respiratory failure.  Initial plan was to transfer to [facility], but liver function significantly improved and nonavailability of the bed.  The patient is staying 
here.  Now, the liver function significantly improved.  During the course of hospitalization, the pat
ient had some borderline troponin positive.  Maximum was 0.75, which is thought to be not significant
 because patient has end-stage renal disease, hemodynamic instability and recent cardiac catheterizat
ion on 1/12/2017 was nonobstructive coronary artery disease, so further, no invasive cardiac workup i
s planned.  The patient remained on vent.



PHYSICAL EXAMINATION:

VITAL SIGNS:  Temperature afebrile, heart rate 95, blood pressure 124/56.

HEENT:  PERRLA.  Extraocular muscles intact.

NECK:  Supple.  No carotid bruit, no thyromegaly.

CHEST:  Clear to auscultation.

HEART:  S1, S2 regular.

ABDOMEN:  Soft.

EXTREMITIES:  Clubbing, cyanosis negative.



BLOOD WORKUP:  WBC 15._____, hemoglobin 8._____, hematocrit 24.8, platelet count 125.  Chemistry show
s sodium 130, potassium 4.0, chloride 100, carbon dioxide 22, anion gap of 21, BUN 55, creatinine 6.5
.  , , significantly improved from 7000.



IMPRESSION:  Acute liver failure, improving, INR 1.17, respiratory failure, morbid obesity, hypertens
ion, hyperlipidemia, end-stage renal disease on dialysis, positive troponin secondary to hemodynamic 
instability and end-stage renal disease, status post cardiac catheterization 1/12/2017, nonobstructiv
e coronary artery disease.



RECOMMENDATION:  Continue dialysis.  Continue treatment for liver failure as per GI.  Try to wean off
 the vent as blood pressures tolerated.  Avoid hepatotoxic medication.  We will follow with you.



Thank you, Dr. Rae, for providing us the opportunity in taking care of the patient.  The patient 
had repeat echocardiography done 5/17/2017 that showed ejection fraction 45%-50%, systolic function o
f right ventricle moderately reduced, mild mitral regurgitation, mild to moderate tricuspid regurgita
tion, dilated inferior vena cava.



Thank you, Dr. Rae, for providing us the opportunity in taking care of the patient.





__________________________________________

Sunil Thompson MD







cc:



DD: 05/24/2017 08:37:09  [id number]

TT: 05/24/2017 10:22:09

Confirmation # [id number]

Dictation # [id number]

en

## 2017-05-24 NOTE — CP.PCM.PN
Subjective





- Date & Time of Evaluation


Date of Evaluation: 05/24/17


Time of Evaluation: 12:00





- Subjective


Subjective: 





DATE: 05/24/2017





SUBJECTIVE:  The patient is a 61-year-old male, still sedated and intubated.  

He has end-stage renal disease on hemodialysis.  He was admitted with sepsis 

and fulminant liver failure, has bilateral lung infiltrates.  White count was 

elevated at 70k.  Flow cytometry on peripheral blood was ordered, which is 

negative.  BCR-ABL by PCR is still pending.  He is currently getting 

hemodialysis now.  He received several units of blood transfusion since 

admission for low hemoglobin.  He was also mildly coagulopathic on admission.  

He has severe sepsis currently on IV antibiotics.  





Failed extubation trial last night. getting hemodialysis now. 





REVIEW OF SYSTEMS:  Could not be obtained.  The patient is sedated and 

intubated.  





PHYSICAL EXAMINATION:


GENERAL:  The patient is sedated and intubated.  


VITAL SIGNS:  reviewed.  


HEENT:  Normal.  Oral mucosa dry.  


NECK:  No lymphadenopathy. 


CHEST:  Air entry present, equal bilateral, bilateral conducted sounds present.

  


CARDIOVASCULAR:  S1, S2 normal, tachycardia plus.


ABDOMEN:  Tenderness, diffuse.  


EXTREMITIES:  No edema.  


CENTRAL NERVOUS SYSTEM:  Sedated and intubated.  


SKIN:  No breakdown.  No petechia, no rash.  


  


LABORATORY DATA:  reviewed.





MEDICATIONS:  reviewed.  


  


ASSESSMENT:  


1.  Leukocytosis.  


2.  Anemia.  


3.  Respiratory failure.  


4.  Sepsis.


5.  End-stage renal disease on hemodialysis.


6.  Acute hepatitis.  





PLAN:  


1.  Leukocytosis.   WC declining. Flow cytometry and peripheral blood is 

negative.  White count is declining.  BCR-ABL is awaited by PCR.  Hemoglobin 

and hematocrit is stable.   


2.  Severe sepsis, improving.  Currently on IV antibiotics.  Antibiotics are 

being tapered down.  


3.  End-stage renal disease, currently getting hemodialysis.   


4.  Respiratory failure, ventilator setting.  Oxygen saturation is better.  

discussed with staff nurse. trial of extubation after completion of hemodalysis 

today. Failed last night. 


5.  Continue DVT prophylaxis with heparin 5000 units subQ q. 12 hours.  





Thank you, Dr. Rae, for allowing us to participate in the patient's care.








__________________________________________


Saira Pallavi MD











Objective





- Vital Signs/Intake and Output


Vital Signs (last 24 hours): 


 











Temp Pulse Resp BP Pulse Ox


 


 99 F   92 H  32 H  167/69 H  99 


 


 05/24/17 16:00  05/24/17 20:15  05/24/17 20:00  05/24/17 19:10  05/24/17 20:00








Intake and Output: 


 











 05/24/17 05/25/17





 18:59 06:59


 


Intake Total 482 


 


Output Total 3100 


 


Balance -2618 














- Medications


Medications: 


 Current Medications





Albuterol/Ipratropium (Duoneb 3 Mg/0.5 Mg (3 Ml) Ud)  3 ml IH Q2H PRN


   PRN Reason: Shortness of Breath


   Last Admin: 05/23/17 21:25 Dose:  3 ml


Albuterol/Ipratropium (Duoneb 3 Mg/0.5 Mg (3 Ml) Ud)  3 ml IH [id number] OSEAS


   Last Admin: 05/24/17 20:15 Dose:  3 ml


Heparin Sodium (Porcine) (Heparin)  5,000 units SC Q12 [facility]


   PRN Reason: Protocol


   Last Admin: 05/24/17 21:03 Dose:  5,000 units


Hydralazine HCl (Apresoline)  10 mg IVP Q6 PRN


   PRN Reason: SBP >180


   Last Admin: 05/17/17 02:05 Dose:  10 mg


Metronidazole (Flagyl)  500 mg in 100 mls @ 100 mls/hr IVPB Q8 [facility]


   PRN Reason: Protocol


   Last Admin: 05/24/17 21:03 Dose:  100 mls/hr


Linezolid (Zyvox 600mg/300ml D5w)  600 mg in 300 mls @ 200 mls/hr IVPB Q12 [facility]


   PRN Reason: Protocol


   Stop: 05/26/17 22:01


   Last Admin: 05/24/17 21:04 Dose:  200 mls/hr


Fentanyl Citrate (Fentanyl Citrate/Sodium Chloride 1 Mg/100 Ml)  1,000 mcg in 

100 mls @ 10 mls/hr IV .Q10H PRN; Protocol; 100 MCG/HR


   PRN Reason: TITRATE PER MD ORDER


   Last Titration: 05/22/17 15:00 Dose:  0 mcg/hr, 0 mls/hr


Dexmedetomidine HCl (Precedex 4 Mcg/Ml (100 Ml))  400 mcg in 100 mls @ 12.111 

mls/hr IV .Q8H16M PRN; Protocol; 0.4 MCG/KG/HR


   PRN Reason: Agitation


   Last Admin: 05/23/17 04:01 Dose:  0.4 mcg/kg/hr, 12.111 mls/hr


NOREPINEPHRINE BIT/0.9 % NACL (Levophed 4 Mg/ 250 Ml Ns Premixed)  4 mg in 250 

mls @ 15 mls/hr IV .[id number] PRN; Protocol; 4 MCG/MIN


   PRN Reason: TITRATE PER MD ORDER


   Last Titration: 05/24/17 05:00 Dose:  0 mcg/min, 0 mls/hr


Propofol (Diprivan)  1,000 mg in 100 mls @ 3.633 mls/hr IV .Q24H PRN; Protocol; 

5 MCG/KG/MIN


   PRN Reason: TITRATE PER MD ORDER


   Last Titration: 05/24/17 08:30 Dose:  0 mcg/kg/min, 0 mls/hr


Insulin Detemir (Levemir)  20 unit SC DAILY [facility]


   Last Admin: 05/24/17 09:23 Dose:  20 unit


Insulin Human Regular (Humulin R Med)  0 units SC Q4 [facility]


   Last Admin: 05/24/17 20:57 Dose:  5 units


Lactulose (Enulose)  20 gm PO Q8H [facility]


   Last Admin: 05/24/17 18:27 Dose:  Not Given


Methylprednisolone (Solu-Medrol)  20 mg IVP Q12 [facility]


   Last Admin: 05/24/17 21:02 Dose:  20 mg


Midazolam HCl (Versed Inj)  5 mg IVP Q2H PRN


   PRN Reason: Sedation


   Last Admin: 05/24/17 00:50 Dose:  5 mg


Ondansetron HCl (Zofran Inj)  4 mg IVP Q6H PRN


   PRN Reason: Nausea/Vomiting


Pantoprazole Sodium (Protonix Inj)  40 mg IVP DAILY [facility]


   Last Admin: 05/24/17 09:22 Dose:  40 mg


Rifaximin (Xifaxan)  550 mg PO BID [facility]


   PRN Reason: Protocol


   Last Admin: 05/24/17 18:28 Dose:  Not Given


Sodium Chloride (Sodium Chloride 7% Inhalation)  4 ml IH [id number] [facility]


   Last Admin: 05/24/17 15:25 Dose:  Not Given


Vancomycin HCl (Vancocin 25 Mg/Ml (Oral Use))  500 mg PO QID [facility]


   PRN Reason: Protocol


   Last Admin: 05/24/17 21:01 Dose:  500 mg











- Labs


Labs: 


 





 05/24/17 06:45 





 05/24/17 06:45 





 











PT  12.6 Seconds (9.9-11.8)  H  05/22/17  10:00    


 


INR  1.17  (0.93-1.08)  H  05/22/17  10:00    


 


APTT  26.8 Seconds (23.7-30.8)   05/18/17  07:00

## 2017-05-24 NOTE — RAD
HISTORY:

pneumonia  



COMPARISON:

05/23/2017 



FINDINGS:



LUNGS:

Vascular congestion and right lower lobe perihilar infiltrate



PLEURA:

The pneumomediastinum seen earlier is no longer seen



CARDIOVASCULAR:

Moderate cardiomegaly



OSSEOUS STRUCTURES:

No significant abnormalities.



VISUALIZED UPPER ABDOMEN:

Normal.



OTHER FINDINGS:

Central lines and tubes unchanged



IMPRESSION:

Resolution of pneumomediastinum

## 2017-05-24 NOTE — PN
DATE: 05/23/2017



The patient is a 61-year-old, seen and examined.  Discussed with the nurse.  The patient is sedated a
nd intubated.  Received hemodialysis yesterday.  He is on a weaning trial.  He seems to be waking up.




PHYSICAL EXAMINATION:

VITAL SIGNS:  He is afebrile, pulse 60, respirations 18, blood pressure 132/50.

LUNGS:  Bilateral fair airflow.  Occasional rhonchi and crackle.

HEART:  S1, S2 audible.

ABDOMEN:  Soft, nontender, no rebound, no guarding.

NEUROLOGIC:  The patient is sedated.  Opens eye on painful stimulus.



LABORATORY DATA:  WBC 15.4, hemoglobin 8.2, hematocrit 24.8, platelet of 128.  Chemistry:  Sodium 136
, potassium 4.1, chloride 99, CO2 24, BUN 38, creatinine 4.4, blood sugar of 120.  Sputum has MRSA.  
The patient has PICC line placed today.



ASSESSMENT:

1.  Status post respiratory failure.

2.  Hepatic failure.

3.  Bilateral pulmonary infiltrates.

4.  Resolving sepsis.

5.  End-stage renal disease, on hemodialysis.

6.  Acute hepatic failure, etiology still undetermined.



PLAN:  Currently, the patient is on Zyvox, Flagyl, and he is getting Xifaxan.  He is on GI prophylaxi
s.  His weaning trial is in progress.  If he remains stable, he might be extubated.





__________________________________________

Antonieta Rae MD







cc:



DD: 05/23/2017 18:43:14  [id number]

TT: 05/24/2017 07:34:47

Confirmation # [id number]

Dictation # [id number]

mn

## 2017-05-24 NOTE — BRONCH
PROCEDURE DATE: 05/24/2017



PROCEDURE:  Bronchoscopy.



INDICATIONS:  Copious secretions and mucus plugs.



DESCRIPTION OF PROCEDURE:  After obtaining informed consent, the patient was 
sedated with propofol.  The bronchoscope was advanced to the tube; however, 
multiple mucus plaque attached to biofilm were noticed.  An attempt at removing 
them and suctioning them out was undertaken.  This was, however, only partially 
successful.  Risk to continue attempting to advance bronchoscope perceived to 
outweigh benefits.  I aborted the procedure.  The patient tolerated procedure 
well.  No immediate complications.





__________________________________________

Joss Chandra MD







cc:   



DD: 05/24/2017 15:35:14  [id number]

TT: 05/24/2017 20:04:08

Job # [id number]

jn

MTDD

## 2017-05-24 NOTE — CP.PCM.PCO
Physician Communication Note





- Physician Communication Note


Physician Communication Note: Air Pericardium-Mediastinum gone!/?Mucus plug-

barotrauma

## 2017-05-25 LAB
ADD MANUAL DIFF?: NO
ALBUMIN/GLOB SERPL: 0.7 {RATIO}
ALP SERPL-CCNC: 133 U/L
ALT SERPL-CCNC: 187 U/L
AST SERPL-CCNC: 104 U/L
BASOPHILS # BLD AUTO: 0.01 K/MM3
BASOPHILS NFR BLD: 0.1 %
BILIRUB SERPL-MCNC: 2.3 MG/DL
BUN SERPL-MCNC: 44 MG/DL
CALCIUM SERPL-MCNC: 10 MG/DL
CHLORIDE SERPL-SCNC: 96 MMOL/L
CO2 SERPL-SCNC: 26 MMOL/L
COHGB MFR BLD: 2.8 %
EOSINOPHIL # BLD: 0 10*3/UL
EOSINOPHIL NFR BLD: 0.1 %
ERYTHROCYTE [DISTWIDTH] IN BLOOD BY AUTOMATED COUNT: 21.7 %
GLOBULIN SER-MCNC: 4.9 GM/DL
GLUCOSE SERPL-MCNC: 245 MG/DL
GRANULOCYTES # BLD: 8.93 10*3/UL
GRANULOCYTES NFR BLD: 83.3 %
HCO3 BLDA-SCNC: 26.2 MMOL/L
HCT VFR BLD CALC: 23.9 %
HHB: 1.4 %
IGG1 SER-MCNC: 1920 MG/DL
IGG2 SER-MCNC: 624 MG/DL
IGG3 SER-MCNC: 192 MG/DL
IGG4 SER-MCNC: 33.2 MG/DL
LYMPHOCYTES # BLD: 0.8 10*3/UL
LYMPHOCYTES NFR BLD AUTO: 7.4 %
MAGNESIUM SERPL-MCNC: 2.2 MG/DL
MCH RBC QN AUTO: 30.1 PG
MCHC RBC AUTO-ENTMCNC: 32.6 G/DL
MCV RBC AUTO: 92.3 FL
METHGB MFR BLD: 2 %
MONOCYTES # BLD AUTO: 1 10*3/UL
MONOCYTES NFR BLD: 9.1 %
O2 CAP BLDA-SCNC: 11.2 ML/DL
O2 CT BLDA-SCNC: 11 ML/DL
PH BLDA: 7.31 [PH]
PHOSPHATE SERPL-MCNC: 7.2 MG/DL
PLATELET # BLD: 119 10^3/UL
PMV BLD AUTO: 9.6 FL
PO2 BLDA: 100 MM/HG
POTASSIUM SERPL-SCNC: 4 MMOL/L
PROT SERPL-MCNC: 8.5 G/DL
SAO2 % BLDA: 93.7 %
SODIUM SERPL-SCNC: 138 MMOL/L
WBC # BLD AUTO: 10.7 10^3/UL

## 2017-05-25 NOTE — CP.CCUPN
<Chantel Zarco - Last Filed: 05/25/17 14:42>





CCU Subjective





- Physician Review


Events Since Last Encounter (Free Text): 





05/25/17 14:06


pt s/e at bedside this AM. Patient was hemodynamically stable and had stable 

respiratory status overnight on the BiPAP. Patient is awake, alert, and 

orientedx3 but shows poor insight. Today he complains of diffuse abdominal pain 

and rectal pain like he has to have a bowel movement but denies any nausea, 

vomiting, chest pain, SOB, fevers, or chills








CCU Objective





- Vital Signs / Intake & Output


Vital Signs (Last 4 hours): 


Vital Signs











  Pulse BP


 


 05/25/17 10:10  93 H  157/68 H











Intake and Output (Last 8hrs): 


 Intake & Output











 05/24/17 05/25/17 05/25/17





 22:59 06:59 14:59


 


Intake Total 457 800 


 


Output Total 3100 100 


 


Balance -2643 700 


 


Weight  118.926 kg 


 


Intake:   


 


   500 


 


    Right Upper arm 417 500 


 


  Oral  300 


 


  Other 40  


 


Output:   


 


  Urine 0  


 


    Straight 0  


 


  Stool 100 100 


 


  Other 3000  














- Physical Exam


Head: Positive for: Atraumatic, Normocephalic.  Negative for: Tenderness, 

Contusion, Swelling, Ecchymosis, Abrasion, Laceration, Other


Pupils: Positive for: PERRL.  Negative for: Sluggish, Non-Reactive, Pinpoint, 

Other


Extroacular Muscles: Positive for: EOMI


Conjunctiva: Positive for: Icteric


Ears: Positive for: Normal


Mouth: Positive for: Moist Mucous Membranes


Pharnyx: Positive for: Other (ET in place)


Nose (External): Positive for: Atraumatic


Neck: Positive for: Trachea Midline.  Negative for: JVD, Lymphadenopathy


Respiratory/Chest: Positive for: Clear to Auscultation, Good Air Exchange.  

Negative for: Respiratory Distress, Accessory Muscle Use


Cardiovascular: Positive for: Regular Rate and Rhythm, Normal S1, S2.  Negative 

for: Murmurs, Rub, Gallop


Abdomen: Positive for: Tenderness (RUQ, Epigastrium), Normal Bowel Sounds.  

Negative for: Distention, Peritoneal Signs, Rebound, Guarding


Rectal: Positive for: Other (rectal tube in place)


Back: Positive for: Normal Inspection.  Negative for: CVA Tenderness, Midline 

Tenderness, Paraspinal Tenderness


Upper Extremity: Positive for: Normal Inspection, Tenderness.  Negative for: 

Cyanosis, Edema


Lower Extremity: Positive for: Normal Inspection, Edema (+3), CALF TENDERNESS


Neurological: Positive for: GCS=15, CN II-XII Intact, Speech Normal, Motor Func 

Grossly Intact


Skin: Positive for: Warm, Dry, Normal Color.  Negative for: Rashes


Psychiatric: Positive for: Alert, Oriented x 3, Normal Concentration.  Negative 

for: Normal Insight





- Medications


Active Medications: 


Active Medications











Generic Name Dose Route Start Last Admin





  Trade Name Freq  PRN Reason Stop Dose Admin


 


Albuterol/Ipratropium  3 ml  05/16/17 00:33  05/23/17 21:25





  Duoneb 3 Mg/0.5 Mg (3 Ml) Ud  IH   3 ml





  Q2H PRN   Administration





  Shortness of Breath   


 


Albuterol/Ipratropium  3 ml  05/22/17 19:30  05/25/17 11:04





  Duoneb 3 Mg/0.5 Mg (3 Ml) Ud  IH   3 ml





  [id number] OSEAS   Administration


 


Amlodipine Besylate  10 mg  05/25/17 10:00  05/25/17 10:10





  Norvasc  PO   10 mg





  DAILY OSEAS   Administration


 


Heparin Sodium (Porcine)  5,000 units  05/16/17 10:00  05/25/17 09:30





  Heparin  SC   5,000 units





  Q12 OSEAS   Administration





  Protocol   


 


Hydralazine HCl  10 mg  05/16/17 02:36  05/17/17 02:05





  Apresoline  IVP   10 mg





  Q6 PRN   Administration





  SBP >180   


 


Linezolid  600 mg in 300 mls @ 200 mls/hr  05/19/17 22:00  05/25/17 09:29





  Zyvox 600mg/300ml D5w  IVPB  05/26/17 22:01  200 mls/hr





  Q12 OSEAS   Administration





  Protocol   


 


Fentanyl Citrate  1,000 mcg in 100 mls @ 10 mls/hr  05/22/17 09:14  05/22/17 15:

00





  Fentanyl Citrate/Sodium Chloride 1 Mg/100 Ml  IV   0 mcg/hr





  .Q10H PRN   0 mls/hr





  TITRATE PER MD ORDER   Titration





  Protocol   





  100 MCG/HR   


 


Dexmedetomidine HCl  400 mcg in 100 mls @ 12.111 mls/hr  05/22/17 09:15  05/23/ 17 04:01





  Precedex 4 Mcg/Ml (100 Ml)  IV   0.4 mcg/kg/hr





  .Q8H16M PRN   12.111 mls/hr





  Agitation   Administration





  Protocol   





  0.4 MCG/KG/HR   


 


NOREPINEPHRINE BIT/0.9 % NACL  4 mg in 250 mls @ 15 mls/hr  05/23/17 19:33  05/ 24/17 05:00





  Levophed 4 Mg/ 250 Ml Ns Premixed  IV   0 mcg/min





  .[id number] PRN   0 mls/hr





  TITRATE PER MD ORDER   Titration





  Protocol   





  4 MCG/MIN   


 


Propofol  1,000 mg in 100 mls @ 3.633 mls/hr  05/24/17 00:23  05/24/17 08:30





  Diprivan  IV   0 mcg/kg/min





  .Q24H PRN   0 mls/hr





  TITRATE PER MD ORDER   Titration





  Protocol   





  5 MCG/KG/MIN   


 


Insulin Detemir  20 unit  05/18/17 12:10  05/25/17 09:31





  Levemir  SC   20 unit





  DAILY OSEAS   Administration


 


Insulin Human Regular  0 units  05/22/17 01:12  05/25/17 09:22





  Humulin R Med  SC   5 units





  Q4 OSEAS   Administration


 


Lisinopril  5 mg  05/25/17 10:00  05/25/17 10:10





  Zestril  PO   5 mg





  DAILY OSEAS   Administration


 


Methylprednisolone  20 mg  05/23/17 10:45  05/25/17 09:30





  Solu-Medrol  IVP   20 mg





  Q12 OSEAS   Administration


 


Midazolam HCl  5 mg  05/23/17 19:56  05/24/17 00:50





  Versed Inj  IVP   5 mg





  Q2H PRN   Administration





  Sedation   


 


Ondansetron HCl  4 mg  05/15/17 22:41  





  Zofran Inj  IVP   





  Q6H PRN   





  Nausea/Vomiting   


 


Pantoprazole Sodium  40 mg  05/16/17 10:00  05/25/17 09:30





  Protonix Inj  IVP   40 mg





  DAILY OSEAS   Administration


 


Rifaximin  550 mg  05/16/17 10:30  05/25/17 09:30





  Xifaxan  PO   550 mg





  BID OSEAS   Administration





  Protocol   


 


Sodium Chloride  4 ml  05/23/17 23:30  05/24/17 15:25





  Sodium Chloride 7% Inhalation  IH   Not Given





  [id number] OSEAS   














- Patient Studies


Lab Studies: 


 Lab Studies











  05/25/17 05/25/17 05/25/17 Range/Units





  06:00 06:00 05:54 


 


WBC   10.7  D   (4.5-11.0)  10^3/ul


 


RBC   2.59 L   (3.5-6.1)  10^6/uL


 


Hgb   7.8 L   (14.0-18.0)  gm/dL


 


Hct   23.9 L   (42.0-52.0)  %


 


MCV   92.3   (80.0-105.0)  fL


 


MCH   30.1   (25.0-35.0)  pg


 


MCHC   32.6   (31.0-37.0)  g/dl


 


RDW   21.7 H   (11.5-14.5)  %


 


Plt Count   119 L   (120.0-450.0)  10^3/uL


 


MPV   9.6   (7.0-11.0)  fl


 


Gran %   83.3 H   (50.0-68.0)  %


 


Lymph % (Auto)   7.4 L   (22.0-35.0)  %


 


Mono % (Auto)   9.1 H   (1.0-6.0)  %


 


Eos % (Auto)   0.1 L   (1.5-5.0)  %


 


Baso % (Auto)   0.1   (0.0-3.0)  %


 


Gran #   8.93 H   (1.4-6.5)  


 


Lymph #   0.8 L   (1.2-3.4)  


 


Mono #   1.0 H   (0.1-0.6)  


 


Eos #   0.0   (0.0-0.7)  


 


Baso #   0.01   (0.0-2.0)  K/mm3


 


Sodium  138    (132-148)  mmol/L


 


Potassium  4.0    (3.6-5.0)  mmol/L


 


Chloride  96 L    ()  mmol/L


 


Carbon Dioxide  26    (21-33)  mmol/L


 


Anion Gap  20    (10-20)  


 


BUN  44 H    (7-21)  mg/dL


 


Creatinine  4.6 H    (0.5-1.4)  mg/dL


 


Est GFR ( Amer)  16    


 


Est GFR (Non-Af Amer)  13    


 


POC Glucose (mg/dL)    281 H  ()  mg/dL


 


Random Glucose  245 H    ()  mg/dL


 


Calcium  10.0    (8.4-10.5)  mg/dL


 


Phosphorus  7.2 H    (2.5-4.5)  mg/dL


 


Magnesium  2.2    (1.7-2.2)  mg/dL


 


Total Bilirubin  2.3 H    (0.2-1.3)  mg/dL


 


AST  104 H    (15-59)  U/L


 


ALT  187 H    (7-56)  U/L


 


Alkaline Phosphatase  133    ()  U/L


 


Total Protein  8.5 H    (5.8-8.3)  g/dL


 


Albumin  3.6    (3.0-4.8)  g/dL


 


Globulin  4.9    gm/dL


 


Albumin/Globulin Ratio  0.7 L    (1.1-1.8)  


 


IgG1     (382-929)  mg/dL


 


IgG2     (241-700)  mg/dL


 


IgG3     ()  mg/dL


 


IgG4     (4.0-86.0)  mg/dL


 


Absolute Lymphs (Flow)     (850-3900)  Cells/mcL


 


% CD4 Cells     (30-61)  Percent


 


Absolute CD4 Count     (490-1740)  Cells/mcL


 


T-Help/Suppress Ratio     (0.86-5.00)  Ratio


 


% CD8 Cells     (12-42)  Percent


 


Absolute CD8 Count     (180-1170)  Cells/mcL


 


BCR/abl Source     


 


BCR/abl Prior Result     


 


BCR/abl Interpretation     


 


BCR/abl1 to abl1 %     (0.000)  


 


BCR/abl1 to abl1 IS %     (0.000)  


 


BCR/abl1 Mnr (p190) Res     


 


BCR/abl1 Mjr (p210) Res     














  05/24/17 05/24/17 05/24/17 Range/Units





  19:27 15:52 11:50 


 


WBC     (4.5-11.0)  10^3/ul


 


RBC     (3.5-6.1)  10^6/uL


 


Hgb     (14.0-18.0)  gm/dL


 


Hct     (42.0-52.0)  %


 


MCV     (80.0-105.0)  fL


 


MCH     (25.0-35.0)  pg


 


MCHC     (31.0-37.0)  g/dl


 


RDW     (11.5-14.5)  %


 


Plt Count     (120.0-450.0)  10^3/uL


 


MPV     (7.0-11.0)  fl


 


Gran %     (50.0-68.0)  %


 


Lymph % (Auto)     (22.0-35.0)  %


 


Mono % (Auto)     (1.0-6.0)  %


 


Eos % (Auto)     (1.5-5.0)  %


 


Baso % (Auto)     (0.0-3.0)  %


 


Gran #     (1.4-6.5)  


 


Lymph #     (1.2-3.4)  


 


Mono #     (0.1-0.6)  


 


Eos #     (0.0-0.7)  


 


Baso #     (0.0-2.0)  K/mm3


 


Sodium     (132-148)  mmol/L


 


Potassium     (3.6-5.0)  mmol/L


 


Chloride     ()  mmol/L


 


Carbon Dioxide     (21-33)  mmol/L


 


Anion Gap     (10-20)  


 


BUN     (7-21)  mg/dL


 


Creatinine     (0.5-1.4)  mg/dL


 


Est GFR (African Amer)     


 


Est GFR (Non-Af Amer)     


 


POC Glucose (mg/dL)  275 H  277 H  133 H  ()  mg/dL


 


Random Glucose     ()  mg/dL


 


Calcium     (8.4-10.5)  mg/dL


 


Phosphorus     (2.5-4.5)  mg/dL


 


Magnesium     (1.7-2.2)  mg/dL


 


Total Bilirubin     (0.2-1.3)  mg/dL


 


AST     (15-59)  U/L


 


ALT     (7-56)  U/L


 


Alkaline Phosphatase     ()  U/L


 


Total Protein     (5.8-8.3)  g/dL


 


Albumin     (3.0-4.8)  g/dL


 


Globulin     gm/dL


 


Albumin/Globulin Ratio     (1.1-1.8)  


 


IgG1     (382-929)  mg/dL


 


IgG2     (241-700)  mg/dL


 


IgG3     ()  mg/dL


 


IgG4     (4.0-86.0)  mg/dL


 


Absolute Lymphs (Flow)     (850-3900)  Cells/mcL


 


% CD4 Cells     (30-61)  Percent


 


Absolute CD4 Count     (490-1740)  Cells/mcL


 


T-Help/Suppress Ratio     (0.86-5.00)  Ratio


 


% CD8 Cells     (12-42)  Percent


 


Absolute CD8 Count     (180-1170)  Cells/mcL


 


BCR/abl Source     


 


BCR/abl Prior Result     


 


BCR/abl Interpretation     


 


BCR/abl1 to abl1 %     (0.000)  


 


BCR/abl1 to abl1 IS %     (0.000)  


 


BCR/abl1 Mnr (p190) Res     


 


BCR/abl1 Mjr (p210) Res     














  05/24/17 05/22/17 05/22/17 Range/Units





  07:22 10:00 06:00 


 


WBC     (4.5-11.0)  10^3/ul


 


RBC     (3.5-6.1)  10^6/uL


 


Hgb     (14.0-18.0)  gm/dL


 


Hct     (42.0-52.0)  %


 


MCV     (80.0-105.0)  fL


 


MCH     (25.0-35.0)  pg


 


MCHC     (31.0-37.0)  g/dl


 


RDW     (11.5-14.5)  %


 


Plt Count     (120.0-450.0)  10^3/uL


 


MPV     (7.0-11.0)  fl


 


Gran %     (50.0-68.0)  %


 


Lymph % (Auto)     (22.0-35.0)  %


 


Mono % (Auto)     (1.0-6.0)  %


 


Eos % (Auto)     (1.5-5.0)  %


 


Baso % (Auto)     (0.0-3.0)  %


 


Gran #     (1.4-6.5)  


 


Lymph #     (1.2-3.4)  


 


Mono #     (0.1-0.6)  


 


Eos #     (0.0-0.7)  


 


Baso #     (0.0-2.0)  K/mm3


 


Sodium     (132-148)  mmol/L


 


Potassium     (3.6-5.0)  mmol/L


 


Chloride     ()  mmol/L


 


Carbon Dioxide     (21-33)  mmol/L


 


Anion Gap     (10-20)  


 


BUN     (7-21)  mg/dL


 


Creatinine     (0.5-1.4)  mg/dL


 


Est GFR (African Amer)     


 


Est GFR (Non-Af Amer)     


 


POC Glucose (mg/dL)  236 H    ()  mg/dL


 


Random Glucose     ()  mg/dL


 


Calcium     (8.4-10.5)  mg/dL


 


Phosphorus     (2.5-4.5)  mg/dL


 


Magnesium     (1.7-2.2)  mg/dL


 


Total Bilirubin     (0.2-1.3)  mg/dL


 


AST     (15-59)  U/L


 


ALT     (7-56)  U/L


 


Alkaline Phosphatase     ()  U/L


 


Total Protein     (5.8-8.3)  g/dL


 


Albumin     (3.0-4.8)  g/dL


 


Globulin     gm/dL


 


Albumin/Globulin Ratio     (1.1-1.8)  


 


IgG1     (382-929)  mg/dL


 


IgG2     (241-700)  mg/dL


 


IgG3     ()  mg/dL


 


IgG4     (4.0-86.0)  mg/dL


 


Absolute Lymphs (Flow)   1585   (850-3900)  Cells/mcL


 


% CD4 Cells   33   (30-61)  Percent


 


Absolute CD4 Count   523   (490-1740)  Cells/mcL


 


T-Help/Suppress Ratio   1.29   (0.86-5.00)  Ratio


 


% CD8 Cells   26   (12-42)  Percent


 


Absolute CD8 Count   406   (180-1170)  Cells/mcL


 


BCR/abl Source    Blood  


 


BCR/abl Prior Result    Not given  


 


BCR/abl Interpretation    See note  


 


BCR/abl1 to abl1 %    0.000  (0.000)  


 


BCR/abl1 to abl1 IS %    0.000  (0.000)  


 


BCR/abl1 Mnr (p190) Res    Not detected  


 


BCR/abl1 Mjr (p210) Res    Not detected  














  05/19/17 Range/Units





  16:12 


 


WBC   (4.5-11.0)  10^3/ul


 


RBC   (3.5-6.1)  10^6/uL


 


Hgb   (14.0-18.0)  gm/dL


 


Hct   (42.0-52.0)  %


 


MCV   (80.0-105.0)  fL


 


MCH   (25.0-35.0)  pg


 


MCHC   (31.0-37.0)  g/dl


 


RDW   (11.5-14.5)  %


 


Plt Count   (120.0-450.0)  10^3/uL


 


MPV   (7.0-11.0)  fl


 


Gran %   (50.0-68.0)  %


 


Lymph % (Auto)   (22.0-35.0)  %


 


Mono % (Auto)   (1.0-6.0)  %


 


Eos % (Auto)   (1.5-5.0)  %


 


Baso % (Auto)   (0.0-3.0)  %


 


Gran #   (1.4-6.5)  


 


Lymph #   (1.2-3.4)  


 


Mono #   (0.1-0.6)  


 


Eos #   (0.0-0.7)  


 


Baso #   (0.0-2.0)  K/mm3


 


Sodium   (132-148)  mmol/L


 


Potassium   (3.6-5.0)  mmol/L


 


Chloride   ()  mmol/L


 


Carbon Dioxide   (21-33)  mmol/L


 


Anion Gap   (10-20)  


 


BUN   (7-21)  mg/dL


 


Creatinine   (0.5-1.4)  mg/dL


 


Est GFR (African Amer)   


 


Est GFR (Non-Af Amer)   


 


POC Glucose (mg/dL)   ()  mg/dL


 


Random Glucose   ()  mg/dL


 


Calcium   (8.4-10.5)  mg/dL


 


Phosphorus   (2.5-4.5)  mg/dL


 


Magnesium   (1.7-2.2)  mg/dL


 


Total Bilirubin   (0.2-1.3)  mg/dL


 


AST   (15-59)  U/L


 


ALT   (7-56)  U/L


 


Alkaline Phosphatase   ()  U/L


 


Total Protein   (5.8-8.3)  g/dL


 


Albumin   (3.0-4.8)  g/dL


 


Globulin   gm/dL


 


Albumin/Globulin Ratio   (1.1-1.8)  


 


IgG1  1920 H  (382-929)  mg/dL


 


IgG2  624  (241-700)  mg/dL


 


IgG3  192 H  ()  mg/dL


 


IgG4  33.2  (4.0-86.0)  mg/dL


 


Absolute Lymphs (Flow)   (850-3900)  Cells/mcL


 


% CD4 Cells   (30-61)  Percent


 


Absolute CD4 Count   (490-1740)  Cells/mcL


 


T-Help/Suppress Ratio   (0.86-5.00)  Ratio


 


% CD8 Cells   (12-42)  Percent


 


Absolute CD8 Count   (180-1170)  Cells/mcL


 


BCR/abl Source   


 


BCR/abl Prior Result   


 


BCR/abl Interpretation   


 


BCR/abl1 to abl1 %   (0.000)  


 


BCR/abl1 to abl1 IS %   (0.000)  


 


BCR/abl1 Mnr (p190) Res   


 


BCR/abl1 Mjr (p210) Res   








 Laboratory Results - last 24 hr











  05/19/17 05/22/17 05/22/17





  16:12 06:00 10:00


 


WBC   


 


RBC   


 


Hgb   


 


Hct   


 


MCV   


 


MCH   


 


MCHC   


 


RDW   


 


Plt Count   


 


MPV   


 


Gran %   


 


Lymph % (Auto)   


 


Mono % (Auto)   


 


Eos % (Auto)   


 


Baso % (Auto)   


 


Gran #   


 


Lymph #   


 


Mono #   


 


Eos #   


 


Baso #   


 


Sodium   


 


Potassium   


 


Chloride   


 


Carbon Dioxide   


 


Anion Gap   


 


BUN   


 


Creatinine   


 


Est GFR ( Amer)   


 


Est GFR (Non-Af Amer)   


 


POC Glucose (mg/dL)   


 


Random Glucose   


 


Calcium   


 


Phosphorus   


 


Magnesium   


 


Total Bilirubin   


 


AST   


 


ALT   


 


Alkaline Phosphatase   


 


Total Protein   


 


Albumin   


 


Globulin   


 


Albumin/Globulin Ratio   


 


IgG1  1920 H  


 


IgG2  624  


 


IgG3  192 H  


 


IgG4  33.2  


 


Absolute Lymphs (Flow)    1585


 


% CD4 Cells    33


 


Absolute CD4 Count    523


 


T-Help/Suppress Ratio    1.29


 


% CD8 Cells    26


 


Absolute CD8 Count    406


 


BCR/abl Source   Blood 


 


BCR/abl Prior Result   Not given 


 


BCR/abl Interpretation   See note 


 


BCR/abl1 to abl1 %   0.000 


 


BCR/abl1 to abl1 IS %   0.000 


 


BCR/abl1 Mnr (p190) Res   Not detected 


 


BCR/abl1 Mjr (p210) Res   Not detected 














  05/24/17 05/24/17 05/24/17





  07:22 11:50 15:52


 


WBC   


 


RBC   


 


Hgb   


 


Hct   


 


MCV   


 


MCH   


 


MCHC   


 


RDW   


 


Plt Count   


 


MPV   


 


Gran %   


 


Lymph % (Auto)   


 


Mono % (Auto)   


 


Eos % (Auto)   


 


Baso % (Auto)   


 


Gran #   


 


Lymph #   


 


Mono #   


 


Eos #   


 


Baso #   


 


Sodium   


 


Potassium   


 


Chloride   


 


Carbon Dioxide   


 


Anion Gap   


 


BUN   


 


Creatinine   


 


Est GFR ( Amer)   


 


Est GFR (Non-Af Amer)   


 


POC Glucose (mg/dL)  236 H  133 H  277 H


 


Random Glucose   


 


Calcium   


 


Phosphorus   


 


Magnesium   


 


Total Bilirubin   


 


AST   


 


ALT   


 


Alkaline Phosphatase   


 


Total Protein   


 


Albumin   


 


Globulin   


 


Albumin/Globulin Ratio   


 


IgG1   


 


IgG2   


 


IgG3   


 


IgG4   


 


Absolute Lymphs (Flow)   


 


% CD4 Cells   


 


Absolute CD4 Count   


 


T-Help/Suppress Ratio   


 


% CD8 Cells   


 


Absolute CD8 Count   


 


BCR/abl Source   


 


BCR/abl Prior Result   


 


BCR/abl Interpretation   


 


BCR/abl1 to abl1 %   


 


BCR/abl1 to abl1 IS %   


 


BCR/abl1 Mnr (p190) Res   


 


BCR/abl1 Mjr (p210) Res   














  05/24/17 05/25/17 05/25/17





  19:27 05:54 06:00


 


WBC    10.7  D


 


RBC    2.59 L


 


Hgb    7.8 L


 


Hct    23.9 L


 


MCV    92.3


 


MCH    30.1


 


MCHC    32.6


 


RDW    21.7 H


 


Plt Count    119 L


 


MPV    9.6


 


Gran %    83.3 H


 


Lymph % (Auto)    7.4 L


 


Mono % (Auto)    9.1 H


 


Eos % (Auto)    0.1 L


 


Baso % (Auto)    0.1


 


Gran #    8.93 H


 


Lymph #    0.8 L


 


Mono #    1.0 H


 


Eos #    0.0


 


Baso #    0.01


 


Sodium   


 


Potassium   


 


Chloride   


 


Carbon Dioxide   


 


Anion Gap   


 


BUN   


 


Creatinine   


 


Est GFR ( Amer)   


 


Est GFR (Non-Af Amer)   


 


POC Glucose (mg/dL)  275 H  281 H 


 


Random Glucose   


 


Calcium   


 


Phosphorus   


 


Magnesium   


 


Total Bilirubin   


 


AST   


 


ALT   


 


Alkaline Phosphatase   


 


Total Protein   


 


Albumin   


 


Globulin   


 


Albumin/Globulin Ratio   


 


IgG1   


 


IgG2   


 


IgG3   


 


IgG4   


 


Absolute Lymphs (Flow)   


 


% CD4 Cells   


 


Absolute CD4 Count   


 


T-Help/Suppress Ratio   


 


% CD8 Cells   


 


Absolute CD8 Count   


 


BCR/abl Source   


 


BCR/abl Prior Result   


 


BCR/abl Interpretation   


 


BCR/abl1 to abl1 %   


 


BCR/abl1 to abl1 IS %   


 


BCR/abl1 Mnr (p190) Res   


 


BCR/abl1 Mjr (p210) Res   














  05/25/17





  06:00


 


WBC 


 


RBC 


 


Hgb 


 


Hct 


 


MCV 


 


MCH 


 


MCHC 


 


RDW 


 


Plt Count 


 


MPV 


 


Gran % 


 


Lymph % (Auto) 


 


Mono % (Auto) 


 


Eos % (Auto) 


 


Baso % (Auto) 


 


Gran # 


 


Lymph # 


 


Mono # 


 


Eos # 


 


Baso # 


 


Sodium  138


 


Potassium  4.0


 


Chloride  96 L


 


Carbon Dioxide  26


 


Anion Gap  20


 


BUN  44 H


 


Creatinine  4.6 H


 


Est GFR ( Amer)  16


 


Est GFR (Non-Af Amer)  13


 


POC Glucose (mg/dL) 


 


Random Glucose  245 H


 


Calcium  10.0


 


Phosphorus  7.2 H


 


Magnesium  2.2


 


Total Bilirubin  2.3 H


 


AST  104 H


 


ALT  187 H


 


Alkaline Phosphatase  133


 


Total Protein  8.5 H


 


Albumin  3.6


 


Globulin  4.9


 


Albumin/Globulin Ratio  0.7 L


 


IgG1 


 


IgG2 


 


IgG3 


 


IgG4 


 


Absolute Lymphs (Flow) 


 


% CD4 Cells 


 


Absolute CD4 Count 


 


T-Help/Suppress Ratio 


 


% CD8 Cells 


 


Absolute CD8 Count 


 


BCR/abl Source 


 


BCR/abl Prior Result 


 


BCR/abl Interpretation 


 


BCR/abl1 to abl1 % 


 


BCR/abl1 to abl1 IS % 


 


BCR/abl1 Mnr (p190) Res 


 


BCR/abl1 Mjr (p210) Res 











Fingerstick Blood Sugar Results: 265





Review of Systems





- Review of Systems


All systems: reviewed and no additional remarkable complaints except (as per HPI

)





- EENT


Eyes: UNREMARKABLE





- Cardiovascular


Cardiovascular: As Per HPI





- Respiratory


Respiratory: Cough (mild cough productive of thick green sputum), Chest 

Congestion.  absent: Dyspnea





- Gastrointestinal


Gastrointestinal: As Per HPI





- Genitourinary


Genitourinary: absent: Dysuria, Flank Pain, Hematuria





- Musculoskeletal


Musculoskeletal: Back Pain





- Integumentary


Integumentary: UNREMARKABLE





- Neurological


Neurological: UNREMARKABLE





Critical Care Progress Note





- Nutrition


Nutrition: 


 Nutrition











 Category Date Time Status


 


 NPO Diet [DIET] Diets  05/17/17 Breakfast Ordered














Assessment/Plan





- Assessment and Plan (Free Text)


Assessment: 





61M with PMH of HTN, IDDM, and ESRD on HD recovering from acute liver injury, 

pancreatitis, and MRSA CAP who was extubated yesterday





Neuro:


AA&Ox3, but with some poor insight, gross neuromuscular function intact


No complaints or concerns


Continue to monitor


Re-oriented often





Cardio: Denies any cardiac symptoms, VSS, exam benign


HGB low but stable from yesterday @7.8


Contineu to monitor


To be transfused 2 units PRBC during HD per nephro recs


continue norvasc 10 daily, lisinopril 50 PO daily


Cardiology following





Respiratory:


complains of productive cough but denies dyspnea


Patient extubated yesterday, stable SaO2 on BiPAP overnight and 5L NC 

throughout the morning


Exam: CTAB, no R/R/W


MRSA pneumonia


Metabolic acidosis improved on EKG


Pulmonology following


Continue duonebs OSEAS and PRN, solumedrol 20Q12


Closely monitor


Supplement O2 as needed to keep SaO2>94%





GI:


patient reports abdominal pain in his RUQ and epigastrium and urge to defecate


Patient denies any nausea, vomiting, still has green colored liquid stool output


Exam: moderately distended, but soft, tenderness in RUQ and epigastrium, no 

signs of peritonitis


NPO


LFT's trending down


Contineu to monitor


GI following--follow up their recs regarding diet and possible D/C lactulose





Nephro:


Creatinine 4.6 today


Patient going for HD this afternoon


Continue to monitor I&O's and CMP


Follow up Dr. Maria's recs





Endo:


Glucose 281, electrolytes wnl


Supplement electrolytes as needed


Continue Levemir and RISS medium protocol





ID: Patient was afebrile overnight


WBC 10.7 down cfrom 15.9 yesterday


MRSA in the sputum


Being followed by ID--continue PO antibiotics per their recs





MSK:


Patient complaining of lower back pain, denies numbness/tingling, gross 

neuromuscularly intact


physical therapy eval/therapy for deconditioning


OOBT once evaluated by PT





PPX: OSEAS, protonix





Dispo: Transfer to med/surg today


.


Patient seen and discussed with Dr. Corazon Zarco, PGY1








<Joss Chandra - Last Filed: 05/25/17 17:10>





CCU Objective





- Vital Signs / Intake & Output


Vital Signs (Last 4 hours): 


Vital Signs











  Temp Pulse Resp BP Pulse Ox


 


 05/25/17 16:45  99.1 F  50 L  20  150/76  94 L


 


 05/25/17 15:16  97.9 F  86  18  136/65 


 


 05/25/17 15:15  97.9 F  86  18  136/65 


 


 05/25/17 14:54  98.2 F  86  18  135/64 


 


 05/25/17 14:39  98.1 F  94 H  18  155/53 H 


 


 05/25/17 14:36  98.1 F  94 H  18  155/53 H 


 


 05/25/17 13:08     149/67 











Intake and Output (Last 8hrs): 


 Intake & Output











 05/25/17 05/25/17 05/25/17





 06:59 14:59 22:59


 


Intake Total 800 10 0


 


Output Total 100  


 


Balance 700 10 0


 


Weight 262 lb 3 oz  


 


Intake:   


 


    


 


    Right Upper arm 500  


 


  Oral 300  


 


  Blood Product  0 0


 


    Red Blood Cells Cpd As1  0 0





    Lr  Unit [id number]   


 


  Other  10 


 


    Red Blood Cells Cpd As1  10 





    Lr  Unit [id number]   


 


Output:   


 


  Stool 100  














- Medications


Active Medications: 


Active Medications











Generic Name Dose Route Start Last Admin





  Trade Name Freq  PRN Reason Stop Dose Admin


 


Albuterol/Ipratropium  3 ml  05/16/17 00:33  05/23/17 21:25





  Duoneb 3 Mg/0.5 Mg (3 Ml) Ud  IH   3 ml





  Q2H PRN   Administration





  Shortness of Breath   


 


Albuterol/Ipratropium  3 ml  05/22/17 19:30  05/25/17 11:04





  Duoneb 3 Mg/0.5 Mg (3 Ml) Ud  IH   3 ml





  [id number] OSEAS   Administration


 


Amlodipine Besylate  10 mg  05/25/17 10:00  05/25/17 10:10





  Norvasc  PO   10 mg





  DAILY OSEAS   Administration


 


Heparin Sodium (Porcine)  5,000 units  05/16/17 10:00  05/25/17 09:30





  Heparin  SC   5,000 units





  Q12 OSEAS   Administration





  Protocol   


 


Hydralazine HCl  10 mg  05/16/17 02:36  05/17/17 02:05





  Apresoline  IVP   10 mg





  Q6 PRN   Administration





  SBP >180   


 


Linezolid  600 mg in 300 mls @ 200 mls/hr  05/19/17 22:00  05/25/17 09:29





  Zyvox 600mg/300ml D5w  IVPB  05/26/17 22:01  200 mls/hr





  Q12 OSEAS   Administration





  Protocol   


 


Fentanyl Citrate  1,000 mcg in 100 mls @ 10 mls/hr  05/22/17 09:14  05/22/17 15:

00





  Fentanyl Citrate/Sodium Chloride 1 Mg/100 Ml  IV   0 mcg/hr





  .Q10H PRN   0 mls/hr





  TITRATE PER MD ORDER   Titration





  Protocol   





  100 MCG/HR   


 


Dexmedetomidine HCl  400 mcg in 100 mls @ 12.111 mls/hr  05/22/17 09:15  05/23/ 17 04:01





  Precedex 4 Mcg/Ml (100 Ml)  IV   0.4 mcg/kg/hr





  .Q8H16M PRN   12.111 mls/hr





  Agitation   Administration





  Protocol   





  0.4 MCG/KG/HR   


 


NOREPINEPHRINE BIT/0.9 % NACL  4 mg in 250 mls @ 15 mls/hr  05/23/17 19:33  05/ 24/17 05:00





  Levophed 4 Mg/ 250 Ml Ns Premixed  IV   0 mcg/min





  .[id number] PRN   0 mls/hr





  TITRATE PER MD ORDER   Titration





  Protocol   





  4 MCG/MIN   


 


Propofol  1,000 mg in 100 mls @ 3.633 mls/hr  05/24/17 00:23  05/24/17 08:30





  Diprivan  IV   0 mcg/kg/min





  .Q24H PRN   0 mls/hr





  TITRATE PER MD ORDER   Titration





  Protocol   





  5 MCG/KG/MIN   


 


Insulin Detemir  20 unit  05/18/17 12:10  05/25/17 09:31





  Levemir  SC   20 unit





  DAILY OSEAS   Administration


 


Insulin Human Regular  0 units  05/22/17 01:12  05/25/17 12:00





  Humulin R Med  SC   5 units





  Q4 OSEAS   Administration


 


Lisinopril  5 mg  05/25/17 10:00  05/25/17 10:10





  Zestril  PO   5 mg





  DAILY OSEAS   Administration


 


Methylprednisolone  20 mg  05/23/17 10:45  05/25/17 09:30





  Solu-Medrol  IVP   20 mg





  Q12 OSEAS   Administration


 


Midazolam HCl  5 mg  05/23/17 19:56  05/24/17 00:50





  Versed Inj  IVP   5 mg





  Q2H PRN   Administration





  Sedation   


 


Ondansetron HCl  4 mg  05/15/17 22:41  





  Zofran Inj  IVP   





  Q6H PRN   





  Nausea/Vomiting   


 


Pantoprazole Sodium  40 mg  05/16/17 10:00  05/25/17 09:30





  Protonix Inj  IVP   40 mg





  DAILY OSEAS   Administration


 


Rifaximin  550 mg  05/16/17 10:30  05/25/17 09:30





  Xifaxan  PO   550 mg





  [facility]   Administration





  Protocol   


 


Sevelamer HCl  1,600 mg  05/25/17 18:00  





  Renagel  PO   





  [facility]   


 


Sodium Chloride  4 ml  05/23/17 23:30  05/24/17 15:25





  Sodium Chloride 7% Inhalation  IH   Not Given





  [id number] OSEAS   














- Patient Studies


Lab Studies: 


 Microbiology Studies











 05/24/17 11:40 Mycobacterial Culture - Preliminary





 Other: Please Indicate 


 


 05/24/17 11:40 Fungal Culture - Preliminary





 Bronchial Washings 








 Lab Studies











  05/25/17 05/25/17 05/25/17 Range/Units





  12:27 06:00 06:00 


 


WBC    10.7  D  (4.5-11.0)  10^3/ul


 


RBC    2.59 L  (3.5-6.1)  10^6/uL


 


Hgb    7.8 L  (14.0-18.0)  gm/dL


 


Hct    23.9 L  (42.0-52.0)  %


 


MCV    92.3  (80.0-105.0)  fL


 


MCH    30.1  (25.0-35.0)  pg


 


MCHC    32.6  (31.0-37.0)  g/dl


 


RDW    21.7 H  (11.5-14.5)  %


 


Plt Count    119 L  (120.0-450.0)  10^3/uL


 


MPV    9.6  (7.0-11.0)  fl


 


Gran %    83.3 H  (50.0-68.0)  %


 


Lymph % (Auto)    7.4 L  (22.0-35.0)  %


 


Mono % (Auto)    9.1 H  (1.0-6.0)  %


 


Eos % (Auto)    0.1 L  (1.5-5.0)  %


 


Baso % (Auto)    0.1  (0.0-3.0)  %


 


Gran #    8.93 H  (1.4-6.5)  


 


Lymph #    0.8 L  (1.2-3.4)  


 


Mono #    1.0 H  (0.1-0.6)  


 


Eos #    0.0  (0.0-0.7)  


 


Baso #    0.01  (0.0-2.0)  K/mm3


 


Sodium   138   (132-148)  mmol/L


 


Potassium   4.0   (3.6-5.0)  mmol/L


 


Chloride   96 L   ()  mmol/L


 


Carbon Dioxide   26   (21-33)  mmol/L


 


Anion Gap   20   (10-20)  


 


BUN   44 H   (7-21)  mg/dL


 


Creatinine   4.6 H   (0.5-1.4)  mg/dL


 


Est GFR ( Amer)   16   


 


Est GFR (Non-Af Amer)   13   


 


POC Glucose (mg/dL)     ()  mg/dL


 


Random Glucose   245 H   ()  mg/dL


 


Calcium   10.0   (8.4-10.5)  mg/dL


 


Phosphorus   7.2 H   (2.5-4.5)  mg/dL


 


Magnesium   2.2   (1.7-2.2)  mg/dL


 


Total Bilirubin   2.3 H   (0.2-1.3)  mg/dL


 


AST   104 H   (15-59)  U/L


 


ALT   187 H   (7-56)  U/L


 


Alkaline Phosphatase   133   ()  U/L


 


Total Protein   8.5 H   (5.8-8.3)  g/dL


 


Albumin   3.6   (3.0-4.8)  g/dL


 


Globulin   4.9   gm/dL


 


Albumin/Globulin Ratio   0.7 L   (1.1-1.8)  


 


IgG1     (382-929)  mg/dL


 


IgG2     (241-700)  mg/dL


 


IgG3     ()  mg/dL


 


IgG4     (4.0-86.0)  mg/dL


 


Absolute Lymphs (Flow)     (850-3900)  Cells/mcL


 


% CD4 Cells     (30-61)  Percent


 


Absolute CD4 Count     (490-1740)  Cells/mcL


 


T-Help/Suppress Ratio     (0.86-5.00)  Ratio


 


% CD8 Cells     (12-42)  Percent


 


Absolute CD8 Count     (180-1170)  Cells/mcL


 


BCR/abl Source     


 


BCR/abl Prior Result     


 


BCR/abl Interpretation     


 


BCR/abl1 to abl1 %     (0.000)  


 


BCR/abl1 to abl1 IS %     (0.000)  


 


BCR/abl1 Mnr (p190) Res     


 


BCR/abl1 Mjr (p210) Res     


 


Blood Type  O POSITIVE    


 


Antibody Screen  Negative    


 


Crossmatch  See Detail    


 


BBK History Checked  Patient has bt    














  05/25/17 05/24/17 05/24/17 Range/Units





  05:54 19:27 15:52 


 


WBC     (4.5-11.0)  10^3/ul


 


RBC     (3.5-6.1)  10^6/uL


 


Hgb     (14.0-18.0)  gm/dL


 


Hct     (42.0-52.0)  %


 


MCV     (80.0-105.0)  fL


 


MCH     (25.0-35.0)  pg


 


MCHC     (31.0-37.0)  g/dl


 


RDW     (11.5-14.5)  %


 


Plt Count     (120.0-450.0)  10^3/uL


 


MPV     (7.0-11.0)  fl


 


Gran %     (50.0-68.0)  %


 


Lymph % (Auto)     (22.0-35.0)  %


 


Mono % (Auto)     (1.0-6.0)  %


 


Eos % (Auto)     (1.5-5.0)  %


 


Baso % (Auto)     (0.0-3.0)  %


 


Gran #     (1.4-6.5)  


 


Lymph #     (1.2-3.4)  


 


Mono #     (0.1-0.6)  


 


Eos #     (0.0-0.7)  


 


Baso #     (0.0-2.0)  K/mm3


 


Sodium     (132-148)  mmol/L


 


Potassium     (3.6-5.0)  mmol/L


 


Chloride     ()  mmol/L


 


Carbon Dioxide     (21-33)  mmol/L


 


Anion Gap     (10-20)  


 


BUN     (7-21)  mg/dL


 


Creatinine     (0.5-1.4)  mg/dL


 


Est GFR (African Amer)     


 


Est GFR (Non-Af Amer)     


 


POC Glucose (mg/dL)  281 H  275 H  277 H  ()  mg/dL


 


Random Glucose     ()  mg/dL


 


Calcium     (8.4-10.5)  mg/dL


 


Phosphorus     (2.5-4.5)  mg/dL


 


Magnesium     (1.7-2.2)  mg/dL


 


Total Bilirubin     (0.2-1.3)  mg/dL


 


AST     (15-59)  U/L


 


ALT     (7-56)  U/L


 


Alkaline Phosphatase     ()  U/L


 


Total Protein     (5.8-8.3)  g/dL


 


Albumin     (3.0-4.8)  g/dL


 


Globulin     gm/dL


 


Albumin/Globulin Ratio     (1.1-1.8)  


 


IgG1     (382-929)  mg/dL


 


IgG2     (241-700)  mg/dL


 


IgG3     ()  mg/dL


 


IgG4     (4.0-86.0)  mg/dL


 


Absolute Lymphs (Flow)     (850-3900)  Cells/mcL


 


% CD4 Cells     (30-61)  Percent


 


Absolute CD4 Count     (490-1740)  Cells/mcL


 


T-Help/Suppress Ratio     (0.86-5.00)  Ratio


 


% CD8 Cells     (12-42)  Percent


 


Absolute CD8 Count     (180-1170)  Cells/mcL


 


BCR/abl Source     


 


BCR/abl Prior Result     


 


BCR/abl Interpretation     


 


BCR/abl1 to abl1 %     (0.000)  


 


BCR/abl1 to abl1 IS %     (0.000)  


 


BCR/abl1 Mnr (p190) Res     


 


BCR/abl1 Mjr (p210) Res     


 


Blood Type     


 


Antibody Screen     


 


Crossmatch     


 


BBK History Checked     














  05/24/17 05/24/17 05/22/17 Range/Units





  11:50 07:22 10:00 


 


WBC     (4.5-11.0)  10^3/ul


 


RBC     (3.5-6.1)  10^6/uL


 


Hgb     (14.0-18.0)  gm/dL


 


Hct     (42.0-52.0)  %


 


MCV     (80.0-105.0)  fL


 


MCH     (25.0-35.0)  pg


 


MCHC     (31.0-37.0)  g/dl


 


RDW     (11.5-14.5)  %


 


Plt Count     (120.0-450.0)  10^3/uL


 


MPV     (7.0-11.0)  fl


 


Gran %     (50.0-68.0)  %


 


Lymph % (Auto)     (22.0-35.0)  %


 


Mono % (Auto)     (1.0-6.0)  %


 


Eos % (Auto)     (1.5-5.0)  %


 


Baso % (Auto)     (0.0-3.0)  %


 


Gran #     (1.4-6.5)  


 


Lymph #     (1.2-3.4)  


 


Mono #     (0.1-0.6)  


 


Eos #     (0.0-0.7)  


 


Baso #     (0.0-2.0)  K/mm3


 


Sodium     (132-148)  mmol/L


 


Potassium     (3.6-5.0)  mmol/L


 


Chloride     ()  mmol/L


 


Carbon Dioxide     (21-33)  mmol/L


 


Anion Gap     (10-20)  


 


BUN     (7-21)  mg/dL


 


Creatinine     (0.5-1.4)  mg/dL


 


Est GFR (African Amer)     


 


Est GFR (Non-Af Amer)     


 


POC Glucose (mg/dL)  133 H  236 H   ()  mg/dL


 


Random Glucose     ()  mg/dL


 


Calcium     (8.4-10.5)  mg/dL


 


Phosphorus     (2.5-4.5)  mg/dL


 


Magnesium     (1.7-2.2)  mg/dL


 


Total Bilirubin     (0.2-1.3)  mg/dL


 


AST     (15-59)  U/L


 


ALT     (7-56)  U/L


 


Alkaline Phosphatase     ()  U/L


 


Total Protein     (5.8-8.3)  g/dL


 


Albumin     (3.0-4.8)  g/dL


 


Globulin     gm/dL


 


Albumin/Globulin Ratio     (1.1-1.8)  


 


IgG1     (382-929)  mg/dL


 


IgG2     (241-700)  mg/dL


 


IgG3     ()  mg/dL


 


IgG4     (4.0-86.0)  mg/dL


 


Absolute Lymphs (Flow)    1585  (850-3900)  Cells/mcL


 


% CD4 Cells    33  (30-61)  Percent


 


Absolute CD4 Count    523  (490-1740)  Cells/mcL


 


T-Help/Suppress Ratio    1.29  (0.86-5.00)  Ratio


 


% CD8 Cells    26  (12-42)  Percent


 


Absolute CD8 Count    406  (180-1170)  Cells/mcL


 


BCR/abl Source     


 


BCR/abl Prior Result     


 


BCR/abl Interpretation     


 


BCR/abl1 to abl1 %     (0.000)  


 


BCR/abl1 to abl1 IS %     (0.000)  


 


BCR/abl1 Mnr (p190) Res     


 


BCR/abl1 Mjr (p210) Res     


 


Blood Type     


 


Antibody Screen     


 


Crossmatch     


 


BBK History Checked     














  05/22/17 05/19/17 Range/Units





  06:00 16:12 


 


WBC    (4.5-11.0)  10^3/ul


 


RBC    (3.5-6.1)  10^6/uL


 


Hgb    (14.0-18.0)  gm/dL


 


Hct    (42.0-52.0)  %


 


MCV    (80.0-105.0)  fL


 


MCH    (25.0-35.0)  pg


 


MCHC    (31.0-37.0)  g/dl


 


RDW    (11.5-14.5)  %


 


Plt Count    (120.0-450.0)  10^3/uL


 


MPV    (7.0-11.0)  fl


 


Gran %    (50.0-68.0)  %


 


Lymph % (Auto)    (22.0-35.0)  %


 


Mono % (Auto)    (1.0-6.0)  %


 


Eos % (Auto)    (1.5-5.0)  %


 


Baso % (Auto)    (0.0-3.0)  %


 


Gran #    (1.4-6.5)  


 


Lymph #    (1.2-3.4)  


 


Mono #    (0.1-0.6)  


 


Eos #    (0.0-0.7)  


 


Baso #    (0.0-2.0)  K/mm3


 


Sodium    (132-148)  mmol/L


 


Potassium    (3.6-5.0)  mmol/L


 


Chloride    ()  mmol/L


 


Carbon Dioxide    (21-33)  mmol/L


 


Anion Gap    (10-20)  


 


BUN    (7-21)  mg/dL


 


Creatinine    (0.5-1.4)  mg/dL


 


Est GFR (African Amer)    


 


Est GFR (Non-Af Amer)    


 


POC Glucose (mg/dL)    ()  mg/dL


 


Random Glucose    ()  mg/dL


 


Calcium    (8.4-10.5)  mg/dL


 


Phosphorus    (2.5-4.5)  mg/dL


 


Magnesium    (1.7-2.2)  mg/dL


 


Total Bilirubin    (0.2-1.3)  mg/dL


 


AST    (15-59)  U/L


 


ALT    (7-56)  U/L


 


Alkaline Phosphatase    ()  U/L


 


Total Protein    (5.8-8.3)  g/dL


 


Albumin    (3.0-4.8)  g/dL


 


Globulin    gm/dL


 


Albumin/Globulin Ratio    (1.1-1.8)  


 


IgG1   1920 H  (382-929)  mg/dL


 


IgG2   624  (241-700)  mg/dL


 


IgG3   192 H  ()  mg/dL


 


IgG4   33.2  (4.0-86.0)  mg/dL


 


Absolute Lymphs (Flow)    (850-3900)  Cells/mcL


 


% CD4 Cells    (30-61)  Percent


 


Absolute CD4 Count    (490-1740)  Cells/mcL


 


T-Help/Suppress Ratio    (0.86-5.00)  Ratio


 


% CD8 Cells    (12-42)  Percent


 


Absolute CD8 Count    (180-1170)  Cells/mcL


 


BCR/abl Source  Blood   


 


BCR/abl Prior Result  Not given   


 


BCR/abl Interpretation  See note   


 


BCR/abl1 to abl1 %  0.000   (0.000)  


 


BCR/abl1 to abl1 IS %  0.000   (0.000)  


 


BCR/abl1 Mnr (p190) Res  Not detected   


 


BCR/abl1 Mjr (p210) Res  Not detected   


 


Blood Type    


 


Antibody Screen    


 


Crossmatch    


 


BBK History Checked    








 Laboratory Results - last 24 hr











  05/19/17 05/22/17 05/22/17





  16:12 06:00 10:00


 


WBC   


 


RBC   


 


Hgb   


 


Hct   


 


MCV   


 


MCH   


 


MCHC   


 


RDW   


 


Plt Count   


 


MPV   


 


Gran %   


 


Lymph % (Auto)   


 


Mono % (Auto)   


 


Eos % (Auto)   


 


Baso % (Auto)   


 


Gran #   


 


Lymph #   


 


Mono #   


 


Eos #   


 


Baso #   


 


Sodium   


 


Potassium   


 


Chloride   


 


Carbon Dioxide   


 


Anion Gap   


 


BUN   


 


Creatinine   


 


Est GFR ( Amer)   


 


Est GFR (Non-Af Amer)   


 


POC Glucose (mg/dL)   


 


Random Glucose   


 


Calcium   


 


Phosphorus   


 


Magnesium   


 


Total Bilirubin   


 


AST   


 


ALT   


 


Alkaline Phosphatase   


 


Total Protein   


 


Albumin   


 


Globulin   


 


Albumin/Globulin Ratio   


 


IgG1  1920 H  


 


IgG2  624  


 


IgG3  192 H  


 


IgG4  33.2  


 


Absolute Lymphs (Flow)    1585


 


% CD4 Cells    33


 


Absolute CD4 Count    523


 


T-Help/Suppress Ratio    1.29


 


% CD8 Cells    26


 


Absolute CD8 Count    406


 


BCR/abl Source   Blood 


 


BCR/abl Prior Result   Not given 


 


BCR/abl Interpretation   See note 


 


BCR/abl1 to abl1 %   0.000 


 


BCR/abl1 to abl1 IS %   0.000 


 


BCR/abl1 Mnr (p190) Res   Not detected 


 


BCR/abl1 Mjr (p210) Res   Not detected 


 


Blood Type   


 


Antibody Screen   


 


Crossmatch   


 


BBK History Checked   














  05/24/17 05/24/17 05/24/17





  07:22 11:50 15:52


 


WBC   


 


RBC   


 


Hgb   


 


Hct   


 


MCV   


 


MCH   


 


MCHC   


 


RDW   


 


Plt Count   


 


MPV   


 


Gran %   


 


Lymph % (Auto)   


 


Mono % (Auto)   


 


Eos % (Auto)   


 


Baso % (Auto)   


 


Gran #   


 


Lymph #   


 


Mono #   


 


Eos #   


 


Baso #   


 


Sodium   


 


Potassium   


 


Chloride   


 


Carbon Dioxide   


 


Anion Gap   


 


BUN   


 


Creatinine   


 


Est GFR ( Amer)   


 


Est GFR (Non-Af Amer)   


 


POC Glucose (mg/dL)  236 H  133 H  277 H


 


Random Glucose   


 


Calcium   


 


Phosphorus   


 


Magnesium   


 


Total Bilirubin   


 


AST   


 


ALT   


 


Alkaline Phosphatase   


 


Total Protein   


 


Albumin   


 


Globulin   


 


Albumin/Globulin Ratio   


 


IgG1   


 


IgG2   


 


IgG3   


 


IgG4   


 


Absolute Lymphs (Flow)   


 


% CD4 Cells   


 


Absolute CD4 Count   


 


T-Help/Suppress Ratio   


 


% CD8 Cells   


 


Absolute CD8 Count   


 


BCR/abl Source   


 


BCR/abl Prior Result   


 


BCR/abl Interpretation   


 


BCR/abl1 to abl1 %   


 


BCR/abl1 to abl1 IS %   


 


BCR/abl1 Mnr (p190) Res   


 


BCR/abl1 Mjr (p210) Res   


 


Blood Type   


 


Antibody Screen   


 


Crossmatch   


 


BBK History Checked   














  05/24/17 05/25/17 05/25/17





  19:27 05:54 06:00


 


WBC    10.7  D


 


RBC    2.59 L


 


Hgb    7.8 L


 


Hct    23.9 L


 


MCV    92.3


 


MCH    30.1


 


MCHC    32.6


 


RDW    21.7 H


 


Plt Count    119 L


 


MPV    9.6


 


Gran %    83.3 H


 


Lymph % (Auto)    7.4 L


 


Mono % (Auto)    9.1 H


 


Eos % (Auto)    0.1 L


 


Baso % (Auto)    0.1


 


Gran #    8.93 H


 


Lymph #    0.8 L


 


Mono #    1.0 H


 


Eos #    0.0


 


Baso #    0.01


 


Sodium   


 


Potassium   


 


Chloride   


 


Carbon Dioxide   


 


Anion Gap   


 


BUN   


 


Creatinine   


 


Est GFR ( Amer)   


 


Est GFR (Non-Af Amer)   


 


POC Glucose (mg/dL)  275 H  281 H 


 


Random Glucose   


 


Calcium   


 


Phosphorus   


 


Magnesium   


 


Total Bilirubin   


 


AST   


 


ALT   


 


Alkaline Phosphatase   


 


Total Protein   


 


Albumin   


 


Globulin   


 


Albumin/Globulin Ratio   


 


IgG1   


 


IgG2   


 


IgG3   


 


IgG4   


 


Absolute Lymphs (Flow)   


 


% CD4 Cells   


 


Absolute CD4 Count   


 


T-Help/Suppress Ratio   


 


% CD8 Cells   


 


Absolute CD8 Count   


 


BCR/abl Source   


 


BCR/abl Prior Result   


 


BCR/abl Interpretation   


 


BCR/abl1 to abl1 %   


 


BCR/abl1 to abl1 IS %   


 


BCR/abl1 Mnr (p190) Res   


 


BCR/abl1 Mjr (p210) Res   


 


Blood Type   


 


Antibody Screen   


 


Crossmatch   


 


BBK History Checked   














  05/25/17 05/25/17





  06:00 12:27


 


WBC  


 


RBC  


 


Hgb  


 


Hct  


 


MCV  


 


MCH  


 


MCHC  


 


RDW  


 


Plt Count  


 


MPV  


 


Gran %  


 


Lymph % (Auto)  


 


Mono % (Auto)  


 


Eos % (Auto)  


 


Baso % (Auto)  


 


Gran #  


 


Lymph #  


 


Mono #  


 


Eos #  


 


Baso #  


 


Sodium  138 


 


Potassium  4.0 


 


Chloride  96 L 


 


Carbon Dioxide  26 


 


Anion Gap  20 


 


BUN  44 H 


 


Creatinine  4.6 H 


 


Est GFR ( Amer)  16 


 


Est GFR (Non-Af Amer)  13 


 


POC Glucose (mg/dL)  


 


Random Glucose  245 H 


 


Calcium  10.0 


 


Phosphorus  7.2 H 


 


Magnesium  2.2 


 


Total Bilirubin  2.3 H 


 


AST  104 H 


 


ALT  187 H 


 


Alkaline Phosphatase  133 


 


Total Protein  8.5 H 


 


Albumin  3.6 


 


Globulin  4.9 


 


Albumin/Globulin Ratio  0.7 L 


 


IgG1  


 


IgG2  


 


IgG3  


 


IgG4  


 


Absolute Lymphs (Flow)  


 


% CD4 Cells  


 


Absolute CD4 Count  


 


T-Help/Suppress Ratio  


 


% CD8 Cells  


 


Absolute CD8 Count  


 


BCR/abl Source  


 


BCR/abl Prior Result  


 


BCR/abl Interpretation  


 


BCR/abl1 to abl1 %  


 


BCR/abl1 to abl1 IS %  


 


BCR/abl1 Mnr (p190) Res  


 


BCR/abl1 Mjr (p210) Res  


 


Blood Type   O POSITIVE


 


Antibody Screen   Negative


 


Crossmatch   See Detail


 


BBK History Checked   Patient has bt














Critical Care Progress Note





- Nutrition


Nutrition: 


 Nutrition











 Category Date Time Status


 


 NPO Diet [DIET] Diets  05/17/17 Breakfast Ordered














Addendum


Addendum: 





05/25/17 17:04


patient was seen, examined and disucssed with Dr. Zarco at bedside. Her note 

reflects my exam, assessment and plan, except as below. meds/Labs reviewed. 





62 yo male recovering from VDRF due to MRSA pneumonia and acute liver failure. 

Extubated yesterday, did well overnight on and off BPAP. alert oriented x 3, 

portecting airways, not in respiratory distress on NC, states he is "doing beter

". FDC resolved. Ok to downgrade to med/surg. speech and swallow eval. 

Agressive fluid removal with HD to increase chances for continued respiratory 

improvement





ccm time 40 min

## 2017-05-25 NOTE — PN
DATE: 05/25/2017



SUBJECTIVE:  The patient is seen in the ICU.  He is awake, he is alert.  He is coherent.  He recogniz
es me.  Case is discussed with ICU residents.  The patient had an episode of agitation, he was aggrav
ated, he was being aggressive with the nursing staff.  He was somewhat confused.  Hemodynamically, he
 has been stable.  His FIO2 is down to 60%.  Currently, he denies any pain.  He denies any shortness 
of breath.  He reports that he wants to move his bowels.  He denies any nausea, vomiting.



PHYSICAL EXAMINATION:

GENERAL:  Obese, middle-aged male lying in bed in the ICU.

VITAL SIGNS:  Blood pressure 157/68, heart rate 93, respiratory rate 18-20, temperature 99, T-max is 
99.5.

HEENT:  Normocephalic, atraumatic, positive pallor.

NECK:  Supple, no JVD.

LUNGS:  Bilateral equal air entry, bilateral rhonchi, rales at right base more than left.

CARDIAC:  S1, S2, regular rate and rhythm.  No murmur, no rub.

ABDOMEN:  Obese, distended, soft, nontender, bowel sounds present.

EXTREMITIES:  Trace lower extremity edema.

INTAKE AND OUTPUT:  1282/3200.



LABORATORY DATA:  WBC 10.7, hemoglobin 7.8, hematocrit 24, platelets 119.  Sodium 138, potassium 4.0,
 chloride 96, CO2 26, BUN 44, creatinine 4.6, glucose 245, calcium 10.0, phosphorus 7.2, magnesium 2.
2.  Total bili 2.3, , , albumin 3.6, globulin 4.9.  



CD4 count 524.



Chest x-ray:  Increasing diffuse infiltrate, right greater than left.



CURRENT MEDICATIONS:  Hydralazine, DuoNeb, heparin subQ, insulin, amlodipine 10 mg, Solu-Medrol 20 IV
 q. 12, rifaximin, Zestril 5, Zofran, Zyvox 600 q. 12.

Vancomycin 500 q.i.d., Flagyl 500 q. 8 IV.



ASSESSMENT:

1.  Respiratory failure secondary to bilateral severe methicillin-resistant Staphylococcus aureus pne
umonia.

2.  Altered mental status.

3.  Acute liver failure/acute fulminant hepatitis.

4.  Severe anemia.

5.  Altered mental status.

6.  End-stage renal disease.

7.  Volume overload.



PLAN:

1.  Ultrafiltration today, 2 hours, will ultrafiltrate 3 kilos.

2.  Transfuse 1 unit of blood during dialysis.

3.  Continue antibiotics for MRSA pneumonia.

4.  Continue empiric vancomycin.

5.  Continue Zestril and amlodipine for hypertension.

6.  Restart phosphate binders, Renagel 800 mg 2 tablets t.i.d. with meals.

7.  Long discussion with patient at bedside, ICU residents, ICU staff, dialysis staff.



More than 35 minutes spent in the care of this critically ill patient.





__________________________________________

Lavonne Maria MD







cc:



DD: 05/25/2017 14:17:03  [id number]

TT: 05/25/2017 14:42:35

Confirmation # [id number]

Dictation # [id number]

mn

## 2017-05-25 NOTE — CP.PCM.PN
<Chantel Zarco - Last Filed: 05/25/17 17:53>





Subjective





- Date & Time of Evaluation


Date of Evaluation: 05/25/17


Time of Evaluation: 17:38





- Subjective


Subjective: 





Progress note for Dr. Chandra


Patient was seen and examined on the 4th floor at bedside. Patient was 

extubated yesterday and tolerated BiPAP with respiratory stability last night. 

Patient had a stable respiratory and hemodynamic status on NC on 5L in the ICU 

today and was transferred up to the 5th floor for further monitoring and 

treatment. Patient is lying comfortably in bed on nasal cannula, with no signs 

of respiratory distress--no retractions, increased respiratory effort, tachypnea

, able to converse without getting short of breath. Patient is awake, alert, 

and oriented, conversing appropriately with good insight, grossly 

neuromuscularly intact. Patient denies any chest pain, SOB, cough, fevers, 

chills, or any other symptoms.





Objective





- Vital Signs/Intake and Output


Vital Signs (last 24 hours): 


 











Temp Pulse Resp BP Pulse Ox


 


 99.1 F   50 L  20   150/76   94 L


 


 05/25/17 16:45  05/25/17 16:45  05/25/17 16:45  05/25/17 16:45  05/25/17 16:45








Intake and Output: 


 











 05/25/17 05/25/17





 06:59 18:59


 


Intake Total 800 10


 


Output Total 100 


 


Balance 700 10














- Medications


Medications: 


 Current Medications





Albuterol/Ipratropium (Duoneb 3 Mg/0.5 Mg (3 Ml) Ud)  3 ml IH Q2H PRN


   PRN Reason: Shortness of Breath


   Last Admin: 05/23/17 21:25 Dose:  3 ml


Albuterol/Ipratropium (Duoneb 3 Mg/0.5 Mg (3 Ml) Ud)  3 ml IH [id number] [facility]


   Last Admin: 05/25/17 17:33 Dose:  Not Given


Amlodipine Besylate (Norvasc)  10 mg PO DAILY [facility]


   Last Admin: 05/25/17 10:10 Dose:  10 mg


Heparin Sodium (Porcine) (Heparin)  5,000 units SC Q12 [facility]


   PRN Reason: Protocol


   Last Admin: 05/25/17 09:30 Dose:  5,000 units


Hydralazine HCl (Apresoline)  10 mg IVP Q6 PRN


   PRN Reason: SBP >180


   Last Admin: 05/17/17 02:05 Dose:  10 mg


Linezolid (Zyvox 600mg/300ml D5w)  600 mg in 300 mls @ 200 mls/hr IVPB Q12 [facility]


   PRN Reason: Protocol


   Stop: 05/26/17 22:01


   Last Admin: 05/25/17 09:29 Dose:  200 mls/hr


Fentanyl Citrate (Fentanyl Citrate/Sodium Chloride 1 Mg/100 Ml)  1,000 mcg in 

100 mls @ 10 mls/hr IV .Q10H PRN; Protocol; 100 MCG/HR


   PRN Reason: TITRATE PER MD ORDER


   Last Titration: 05/22/17 15:00 Dose:  0 mcg/hr, 0 mls/hr


Dexmedetomidine HCl (Precedex 4 Mcg/Ml (100 Ml))  400 mcg in 100 mls @ 12.111 

mls/hr IV .Q8H16M PRN; Protocol; 0.4 MCG/KG/HR


   PRN Reason: Agitation


   Last Admin: 05/23/17 04:01 Dose:  0.4 mcg/kg/hr, 12.111 mls/hr


NOREPINEPHRINE BIT/0.9 % NACL (Levophed 4 Mg/ 250 Ml Ns Premixed)  4 mg in 250 

mls @ 15 mls/hr IV .[id number] PRN; Protocol; 4 MCG/MIN


   PRN Reason: TITRATE PER MD ORDER


   Last Titration: 05/24/17 05:00 Dose:  0 mcg/min, 0 mls/hr


Propofol (Diprivan)  1,000 mg in 100 mls @ 3.633 mls/hr IV .Q24H PRN; Protocol; 

5 MCG/KG/MIN


   PRN Reason: TITRATE PER MD ORDER


   Last Titration: 05/24/17 08:30 Dose:  0 mcg/kg/min, 0 mls/hr


Insulin Detemir (Levemir)  20 unit SC DAILY [facility]


   Last Admin: 05/25/17 09:31 Dose:  20 unit


Insulin Human Regular (Humulin R Med)  0 units SC Q4 [facility]


   Last Admin: 05/25/17 12:00 Dose:  5 units


Lisinopril (Zestril)  5 mg PO DAILY [facility]


   Last Admin: 05/25/17 10:10 Dose:  5 mg


Methylprednisolone (Solu-Medrol)  20 mg IVP Q12 [facility]


   Last Admin: 05/25/17 09:30 Dose:  20 mg


Midazolam HCl (Versed Inj)  5 mg IVP Q2H PRN


   PRN Reason: Sedation


   Last Admin: 05/24/17 00:50 Dose:  5 mg


Ondansetron HCl (Zofran Inj)  4 mg IVP Q6H PRN


   PRN Reason: Nausea/Vomiting


Pantoprazole Sodium (Protonix Inj)  40 mg IVP DAILY [facility]


   Last Admin: 05/25/17 09:30 Dose:  40 mg


Rifaximin (Xifaxan)  550 mg PO BID [facility]


   PRN Reason: Protocol


   Last Admin: 05/25/17 09:30 Dose:  550 mg


Sevelamer HCl (Renagel)  1,600 mg PO TID [facility]


Sodium Chloride (Sodium Chloride 7% Inhalation)  4 ml IH [id number] [facility]


   Last Admin: 05/24/17 15:25 Dose:  Not Given











- Labs


Labs: 


 





 05/25/17 06:00 





 05/25/17 06:00 





 











PT  12.6 Seconds (9.9-11.8)  H  05/22/17  10:00    


 


INR  1.17  (0.93-1.08)  H  05/22/17  10:00    


 


APTT  26.8 Seconds (23.7-30.8)   05/18/17  07:00    














- Constitutional


Appears: Well, Non-toxic, No Acute Distress





- Head Exam


Head Exam: ATRAUMATIC, NORMOCEPHALIC





- Eye Exam


Eye Exam: EOMI, Scleral icterus.  absent: Conjunctival injection





- ENT Exam


ENT Exam: Mucous Membranes Moist, Normal Oropharynx





- Respiratory Exam


Respiratory Exam: Rhonchi (rhonchi in the upper lobes), NORMAL BREATHING 

PATTERN.  absent: Accessory Muscle Use, Rales, Wheezes, Respiratory Distress





- Cardiovascular Exam


Cardiovascular Exam: RRR, +S1, +S2.  absent: Murmur





- GI/Abdominal Exam


GI & Abdominal Exam: Distended, Soft.  absent: Tenderness, Rebound





- Extremities Exam


Extremities Exam: absent: Calf Tenderness, Pedal Edema, Tenderness





- Neurological Exam


Neurological Exam: Alert, Awake, Oriented x3





- Psychiatric Exam


Psychiatric exam: Normal Affect, Normal Mood





- Skin


Skin Exam: Dry, Normal Color, Warm





Assessment and Plan





- Assessment and Plan (Free Text)


Assessment: 


62 yo male with PMH of IDDM, ESRD on HD recently admitted to the ICU for VDRF 

due to MRSA pneumonia and acute liver failure. He was extubated yesterday, and 

had stable respiratory status on 5L NC and is currently stable and AA&OX3 with 

no signs of respiratory distress on 3L NC, protecting airways well.





Plan:


continue to monitor respiratory status closely, supplement O2 as necessary to 

keep SaO2>94%


Continue current regimen of duonebs, solumedrol 20 IV BID


Continue to follow up bronchial washing cultures and mycobacterial cultures


Follow ID recommendations for antibiotics





Patient examined with Dr. Corazon Zarco, PGY1





<Joss Chandra - Last Filed: 05/30/17 17:35>





Objective





- Vital Signs/Intake and Output


Vital Signs (last 24 hours): 


 











Temp Pulse Resp BP Pulse Ox


 


 98.2 F   85   20   155/72 H  98 


 


 05/30/17 16:00  05/30/17 16:00  05/30/17 16:00  05/30/17 16:00  05/30/17 16:00








Intake and Output: 


 











 05/30/17 05/30/17





 06:59 18:59


 


Intake Total  480


 


Balance  480














- Medications


Medications: 


 Current Medications





Albuterol/Ipratropium (Duoneb 3 Mg/0.5 Mg (3 Ml) Ud)  3 ml IH Q2H PRN


   PRN Reason: Shortness of Breath


   Last Admin: 05/30/17 16:41 Dose:  3 ml


Amlodipine Besylate (Norvasc)  10 mg PO DAILY [facility]


   Last Admin: 05/30/17 09:29 Dose:  10 mg


Aspirin (Ecotrin)  81 mg PO DAILY [facility]


   Last Admin: 05/30/17 09:29 Dose:  81 mg


Heparin Sodium (Porcine) (Heparin)  5,000 units SC Q12 [facility]


   PRN Reason: Protocol


   Last Admin: 05/30/17 09:29 Dose:  5,000 units


Hydralazine HCl (Apresoline)  10 mg IVP Q6 PRN


   PRN Reason: SBP >180


   Last Admin: 05/17/17 02:05 Dose:  10 mg


Insulin Detemir (Levemir)  20 unit SC DAILY [facility]


   Last Admin: 05/30/17 09:27 Dose:  20 unit


Insulin Human Regular (Humulin R Med)  0 units SC Q4 [facility]


   Last Admin: 05/30/17 17:16 Dose:  8 units


Lisinopril (Zestril)  10 mg PO DAILY [facility]


   Last Admin: 05/30/17 09:28 Dose:  10 mg


Ondansetron HCl (Zofran Inj)  4 mg IVP Q6H PRN


   PRN Reason: Nausea/Vomiting


   Last Admin: 05/27/17 23:30 Dose:  4 mg


Pantoprazole Sodium (Protonix Ec Tab)  40 mg PO 0630 [facility]


   Last Admin: 05/30/17 05:39 Dose:  40 mg


Prednisone (Prednisone Tab)  10 mg PO DAILY [facility]


Rifaximin (Xifaxan)  550 mg PO BID [facility]


   PRN Reason: Protocol


   Last Admin: 05/30/17 17:17 Dose:  550 mg


Sevelamer HCl (Renagel)  1,600 mg PO TID [facility]


   Last Admin: 05/30/17 17:16 Dose:  1,600 mg


Vancomycin HCl (Vancocin 25 Mg/Ml (Oral Use))  125 mg PO QID [facility]


   PRN Reason: Protocol


   Last Admin: 05/30/17 17:17 Dose:  125 mg











- Labs


Labs: 


 





 05/29/17 10:20 





 05/29/17 10:20 





 











PT  12.6 Seconds (9.9-11.8)  H  05/22/17  10:00    


 


INR  1.17  (0.93-1.08)  H  05/22/17  10:00    


 


APTT  26.8 Seconds (23.7-30.8)   05/18/17  07:00    














Attending/Attestation





- Attestation


I have personally seen and examined this patient.: Yes


I have fully participated in the care of the patient.: Yes


I have reviewed all pertinent clinical information, including history, physical 

exam and plan: Yes


Notes (Text): 





05/30/17 17:34


Patient is comfortable, not in respiratory or otherwise distress. agree with 

above

## 2017-05-25 NOTE — PN
DATE: 05/24/2017



SUBJECTIVE:  The patient is seen in the ICU.  He is awake, he is alert.  He is 
on BiPAP.  The events over the last 24 hours are noted and discussed with the 
house staff.  The patient was found to be cyanotic, unresponsive on vent.  Code 
blue was called. CPR was initiated.  The patient received epi.  The patient was 
extubated because he had tenacious mucus plugging that was difficult/impossible 
to dislodge.  Once the patient was extubated, he was found to be clinically 
better, awake, responsive, oxygenating better.  Overnight, the patient has been 
on BiPAP.  Moving all extremities.  He was dialyzed earlier today.  He received 
4 hours of dialysis, 3000 cc was taken off. oxygenation improved.  



PHYSICAL EXAMINATION: 

GENERAL:  Obese, middle-aged male, seen in the ICU on BiPAP.

VITAL SIGNS: blood pressure 140/89, heart rate is 92, respiratory rate 24, _____
, T-max is 99.2.  

HEENT:  Normocephalic, atraumatic.  Positive pallor.

NECK:  Supple, no JVD.

LUNGS:  Bilateral rhonchi, bilateral crackles, equal expansion.

CARDIAC:  S1, S2, regular rate and rhythm. no murmurs, no rub.  

ABDOMEN:  obese, soft, nontender, bowel sounds present.

EXTREMITIES:  Trace lower-extremity edema.

INTAKE AND OUTPUT:  3160/3250.



LABORATORY DATA:  WBC 15.9, hemoglobin 7.7, hematocrit 23, platelets 111.  
Sodium 138, potassium 4.6, chloride 100, CO2 of 22, BUN 55, creatinine 6._____, 
231, calcium 10.0, phosphorus 9.1, magnesium 2.2, , , total 
bilirubin 2.3.



BRONCHOSCOPY REPORT:  The bronchoscope was advanced through the tube; however, 
multiple mucous plugs attached to biofilm were noticed.  Attempt at removing 
them was unsuccessful.



CURRENT MEDICATIONS:  Apresoline, Diprivan, _____, DuoNeb inhaler, Protonix, 
Solu-Medrol, p.o. vancomycin, Zyvox 600 q.12, Zofran, rifaximin.



ASSESSMENT AND PLAN:  

1.  A 61-year-old male with extensive bilateral multilobar pneumonia, 
respiratory failure, altered mental status, acute fulminant_ hepatitis, status 
post code blue last night, found to have mucus plugging.  The patient extubated 
because mucous plug was not able to be dislodged.  Doing better post extubation
, currently hemodynamically stable.  Respiratory status acceptable on BiPAP, 
oxygenating well.  The patient received 4 hours of dialysis this morning.  
Multiple events.  

2.  Liver enzymes are improving.  Transaminitis is improving.  Pancreatitis 
seems to be improving.  Clinically improving.  

3.  Etiology of his acute pulmonary hypertension is still not clear.  Drug 
induced versus viral hepatitis.  

4.  At this time, continue p.o. vancomycin.

5.  Continue p.o. Zyvox.

6.  Continue to monitor closely in the ICU.   

7.  Dialysis/ultrafiltration daily as needed.



Case was discussed at length with resident staff, ICU residents, dialysis staff
, Dr. Chandra.



More than 35 minutes was spent in the care of this critically-ill patient.





__________________________________________

Lavonne Maria MD







cc:   



DD: 05/24/2017 22:10:05  [id number]

TT: 05/24/2017 23:02:15

Confirmation # [id number]

Dictation # [id number]

vn

MTDD

## 2017-05-25 NOTE — CP.PCM.PN
Subjective





- Date & Time of Evaluation


Date of Evaluation: 05/25/17


Time of Evaluation: 13:41





- Subjective


Subjective: 





GI for Dr. Morales





Pt s&e. Pt is extubated. AAOx3. Resting comfortably. Fully communicating w/o 

difficulty. Denies F/C/N/V/CP/SOB. No ambulation. Has rectal tube. 





Objective





- Vital Signs/Intake and Output


Vital Signs (last 24 hours): 


 











Temp Pulse Resp BP Pulse Ox


 


 99 F   93 H  43 H  157/68 H  91 L


 


 05/25/17 04:00  05/25/17 10:10  05/25/17 09:08  05/25/17 10:10  05/25/17 09:08








Intake and Output: 


 











 05/25/17 05/25/17





 06:59 18:59


 


Intake Total 800 


 


Output Total 100 


 


Balance 700 














- Medications


Medications: 


 Current Medications





Albuterol/Ipratropium (Duoneb 3 Mg/0.5 Mg (3 Ml) Ud)  3 ml IH Q2H PRN


   PRN Reason: Shortness of Breath


   Last Admin: 05/23/17 21:25 Dose:  3 ml


Albuterol/Ipratropium (Duoneb 3 Mg/0.5 Mg (3 Ml) Ud)  3 ml IH [id number] [facility]


   Last Admin: 05/25/17 11:04 Dose:  3 ml


Amlodipine Besylate (Norvasc)  10 mg PO DAILY [facility]


   Last Admin: 05/25/17 10:10 Dose:  10 mg


Heparin Sodium (Porcine) (Heparin)  5,000 units SC Q12 [facility]


   PRN Reason: Protocol


   Last Admin: 05/25/17 09:30 Dose:  5,000 units


Hydralazine HCl (Apresoline)  10 mg IVP Q6 PRN


   PRN Reason: SBP >180


   Last Admin: 05/17/17 02:05 Dose:  10 mg


Metronidazole (Flagyl)  500 mg in 100 mls @ 100 mls/hr IVPB Q8 [facility]


   PRN Reason: Protocol


   Last Admin: 05/25/17 05:37 Dose:  100 mls/hr


Linezolid (Zyvox 600mg/300ml D5w)  600 mg in 300 mls @ 200 mls/hr IVPB Q12 [facility]


   PRN Reason: Protocol


   Stop: 05/26/17 22:01


   Last Admin: 05/25/17 09:29 Dose:  200 mls/hr


Fentanyl Citrate (Fentanyl Citrate/Sodium Chloride 1 Mg/100 Ml)  1,000 mcg in 

100 mls @ 10 mls/hr IV .Q10H PRN; Protocol; 100 MCG/HR


   PRN Reason: TITRATE PER MD ORDER


   Last Titration: 05/22/17 15:00 Dose:  0 mcg/hr, 0 mls/hr


Dexmedetomidine HCl (Precedex 4 Mcg/Ml (100 Ml))  400 mcg in 100 mls @ 12.111 

mls/hr IV .Q8H16M PRN; Protocol; 0.4 MCG/KG/HR


   PRN Reason: Agitation


   Last Admin: 05/23/17 04:01 Dose:  0.4 mcg/kg/hr, 12.111 mls/hr


NOREPINEPHRINE BIT/0.9 % NACL (Levophed 4 Mg/ 250 Ml Ns Premixed)  4 mg in 250 

mls @ 15 mls/hr IV .[id number] PRN; Protocol; 4 MCG/MIN


   PRN Reason: TITRATE PER MD ORDER


   Last Titration: 05/24/17 05:00 Dose:  0 mcg/min, 0 mls/hr


Propofol (Diprivan)  1,000 mg in 100 mls @ 3.633 mls/hr IV .Q24H PRN; Protocol; 

5 MCG/KG/MIN


   PRN Reason: TITRATE PER MD ORDER


   Last Titration: 05/24/17 08:30 Dose:  0 mcg/kg/min, 0 mls/hr


Insulin Detemir (Levemir)  20 unit SC DAILY [facility]


   Last Admin: 05/25/17 09:31 Dose:  20 unit


Insulin Human Regular (Humulin R Med)  0 units SC Q4 [facility]


   Last Admin: 05/25/17 09:22 Dose:  5 units


Lactulose (Enulose)  20 gm PO Q8H [facility]


   Last Admin: 05/25/17 03:49 Dose:  20 gm


Lisinopril (Zestril)  5 mg PO DAILY [facility]


   Last Admin: 05/25/17 10:10 Dose:  5 mg


Methylprednisolone (Solu-Medrol)  20 mg IVP Q12 [facility]


   Last Admin: 05/25/17 09:30 Dose:  20 mg


Midazolam HCl (Versed Inj)  5 mg IVP Q2H PRN


   PRN Reason: Sedation


   Last Admin: 05/24/17 00:50 Dose:  5 mg


Ondansetron HCl (Zofran Inj)  4 mg IVP Q6H PRN


   PRN Reason: Nausea/Vomiting


Pantoprazole Sodium (Protonix Inj)  40 mg IVP DAILY [facility]


   Last Admin: 05/25/17 09:30 Dose:  40 mg


Rifaximin (Xifaxan)  550 mg PO BID [facility]


   PRN Reason: Protocol


   Last Admin: 05/25/17 09:30 Dose:  550 mg


Sodium Chloride (Sodium Chloride 7% Inhalation)  4 ml IH [id number] [facility]


   Last Admin: 05/24/17 15:25 Dose:  Not Given


Vancomycin HCl (Vancocin 25 Mg/Ml (Oral Use))  500 mg PO QID [facility]


   PRN Reason: Protocol


   Last Admin: 05/25/17 09:33 Dose:  500 mg











- Labs


Labs: 


 





 05/25/17 06:00 





 05/25/17 06:00 





 











PT  12.6 Seconds (9.9-11.8)  H  05/22/17  10:00    


 


INR  1.17  (0.93-1.08)  H  05/22/17  10:00    


 


APTT  26.8 Seconds (23.7-30.8)   05/18/17  07:00    














- Constitutional


Appears: No Acute Distress





- Head Exam


Head Exam: ATRAUMATIC, NORMAL INSPECTION, NORMOCEPHALIC





- Eye Exam


Eye Exam: EOMI, Normal appearance, PERRL


Pupil Exam: NORMAL ACCOMODATION, PERRL





- ENT Exam


ENT Exam: Mucous Membranes Moist, Normal Exam





- Neck Exam


Neck Exam: Full ROM, Normal Inspection.  absent: Lymphadenopathy





- Respiratory Exam


Respiratory Exam: NORMAL BREATHING PATTERN





- Cardiovascular Exam


Cardiovascular Exam: REGULAR RHYTHM





- GI/Abdominal Exam


GI & Abdominal Exam: Soft, Normal Bowel Sounds.  absent: Distended, Firm, 

Guarding, Rigid, Tenderness, Rebound





- Rectal Exam


Additional comments: 





rectal tube in place 





- Extremities Exam


Extremities Exam: Normal Capillary Refill, Pedal Edema





- Back Exam


Back Exam: NORMAL INSPECTION





- Neurological Exam


Neurological Exam: Alert, Awake, CN II-XII Intact, Oriented x3





- Psychiatric Exam


Psychiatric exam: Normal Affect, Normal Mood





- Skin


Skin Exam: Dry, Intact, Normal Color, Warm





Assessment and Plan





- Assessment and Plan (Free Text)


Assessment: 





61 M w PMD ESRD on HD , DM , CAD, GERD came with vomiting and respiratory 

distress. found to have elevated LFT and ammonia (63->29). 


Hep panel is negative. On initial labs MELD score: 32 -> 50% 3month mortality. 


Differential dx: Acetominophen induced acute hepatic failure , hepatic 

encephalopathy. 


Liver failure with Unknown etiology: LFT, Lipase trending down 





Plan


-ABX per ID 


-Trend LFT 


-Medical management 


-We will continue to follow closely 





Case Discussed with Dr. Morales

## 2017-05-25 NOTE — PN
DATE: 05/25/2017



SUBJECTIVE:  The patient is a 61-year-old, seen and examined.  He was extubated successfully, eating 
and tolerating, had swallowing eval done and was recommended to have pureed food.  No history of naus
ea or vomiting.  Complained of generalized weakness, seemed to be somewhat confused.



PHYSICAL EXAMINATION:

VITAL SIGNS:  Temperature 99.1, pulse 50, respirations 20, blood pressure 150/76.

LUNGS:  Bilateral fair airflow, no rhonchi or crackle.

HEART:  S1, S2 audible.

ABDOMEN:  Soft, obese, nontender, no rebound, no guarding.

NEUROLOGIC:  The patient is awake and alert, moves all extremities, communicative.



LABORATORY EXAMINATION:  WBC is 10.7, hemoglobin 7.8, hematocrit 23.9, platelet of 119.  Chemistry:  
Sodium 138, potassium 4.0, chloride 96, CO2 _____, BUN 44, creatinine 4.6, blood sugar of 254.  Phosp
horus 7.2, total bilirubin 2.3.



ASSESSMENT:

1.  Status post hepatic failure.

2.  Cholelithiasis.

3.  Gallstone pancreatitis, resolving.

4.  Acute renal and hepatic failure, improving.

5.  Anemia.

6.  Improving metabolic encephalopathy.

7.  End-stage renal disease, on hemodialysis.



PLAN:  The patient will get dialysis today.  Will be given 1 blood transfusion.  Currently, he is on 
nebulizer treatment. Monitor blood sugar.  Advance his diet.  A small dose of IV steroid and _____.  
He is also being given Zyvox 600 twice a day and lisinopril 5 mg daily.  We will continue that. We wi
ll follow up his electrolytes in a.m. Request for TCU evaluation and physical therapy.





__________________________________________

Antonieta Rae MD







cc:



DD: 05/25/2017 17:47:17  [id number]

TT: 05/25/2017 18:10:42

Confirmation # [id number]

Dictation # [id number]

ln

## 2017-05-25 NOTE — CP.PCM.PN
Subjective





- Date & Time of Evaluation


Date of Evaluation: 05/25/17


Time of Evaluation: 09:00





- Subjective


Subjective: 





Patient seen earlier in ICU. PAtient is now extubated, awake and alert, afebrile

, still with watery stools.





Objective





- Vital Signs/Intake and Output


Vital Signs (last 24 hours): 


 











Temp Pulse Resp BP Pulse Ox


 


 99.1 F   50 L  20   150/76   99 


 


 05/25/17 16:45  05/25/17 16:45  05/25/17 16:45  05/25/17 16:45  05/25/17 17:52








Intake and Output: 


 











 05/25/17 05/25/17





 06:59 18:59


 


Intake Total 800 10


 


Output Total 100 


 


Balance 700 10














- Medications


Medications: 


 Current Medications





Albuterol/Ipratropium (Duoneb 3 Mg/0.5 Mg (3 Ml) Ud)  3 ml IH Q2H PRN


   PRN Reason: Shortness of Breath


   Last Admin: 05/23/17 21:25 Dose:  3 ml


Albuterol/Ipratropium (Duoneb 3 Mg/0.5 Mg (3 Ml) Ud)  3 ml IH [id number] [facility]


   Last Admin: 05/25/17 17:33 Dose:  Not Given


Amlodipine Besylate (Norvasc)  10 mg PO DAILY [facility]


   Last Admin: 05/25/17 10:10 Dose:  10 mg


Heparin Sodium (Porcine) (Heparin)  5,000 units SC Q12 [facility]


   PRN Reason: Protocol


   Last Admin: 05/25/17 09:30 Dose:  5,000 units


Hydralazine HCl (Apresoline)  10 mg IVP Q6 PRN


   PRN Reason: SBP >180


   Last Admin: 05/17/17 02:05 Dose:  10 mg


Linezolid (Zyvox 600mg/300ml D5w)  600 mg in 300 mls @ 200 mls/hr IVPB Q12 [facility]


   PRN Reason: Protocol


   Stop: 05/26/17 22:01


   Last Admin: 05/25/17 09:29 Dose:  200 mls/hr


Fentanyl Citrate (Fentanyl Citrate/Sodium Chloride 1 Mg/100 Ml)  1,000 mcg in 

100 mls @ 10 mls/hr IV .Q10H PRN; Protocol; 100 MCG/HR


   PRN Reason: TITRATE PER MD ORDER


   Last Titration: 05/22/17 15:00 Dose:  0 mcg/hr, 0 mls/hr


NOREPINEPHRINE BIT/0.9 % NACL (Levophed 4 Mg/ 250 Ml Ns Premixed)  4 mg in 250 

mls @ 15 mls/hr IV .[id number] PRN; Protocol; 4 MCG/MIN


   PRN Reason: TITRATE PER MD ORDER


   Last Titration: 05/24/17 05:00 Dose:  0 mcg/min, 0 mls/hr


Propofol (Diprivan)  1,000 mg in 100 mls @ 3.633 mls/hr IV .Q24H PRN; Protocol; 

5 MCG/KG/MIN


   PRN Reason: TITRATE PER MD ORDER


   Last Titration: 05/24/17 08:30 Dose:  0 mcg/kg/min, 0 mls/hr


Meropenem 500 mg/ Sodium (Chloride)  100 mls @ 100 mls/hr IVPB Q12 [facility]


   PRN Reason: Protocol


   Stop: 05/28/17 22:01


Insulin Detemir (Levemir)  20 unit SC DAILY [facility]


   Last Admin: 05/25/17 09:31 Dose:  20 unit


Insulin Human Regular (Humulin R Med)  0 units SC Q4 [facility]


   Last Admin: 05/25/17 18:46 Dose:  1 units


Lisinopril (Zestril)  5 mg PO DAILY [facility]


   Last Admin: 05/25/17 10:10 Dose:  5 mg


Methylprednisolone (Solu-Medrol)  20 mg IVP Q12 [facility]


   Last Admin: 05/25/17 09:30 Dose:  20 mg


Midazolam HCl (Versed Inj)  5 mg IVP Q2H PRN


   PRN Reason: Sedation


   Last Admin: 05/24/17 00:50 Dose:  5 mg


Ondansetron HCl (Zofran Inj)  4 mg IVP Q6H PRN


   PRN Reason: Nausea/Vomiting


Pantoprazole Sodium (Protonix Inj)  40 mg IVP DAILY [facility]


   Last Admin: 05/25/17 09:30 Dose:  40 mg


Rifaximin (Xifaxan)  550 mg PO BID [facility]


   PRN Reason: Protocol


   Last Admin: 05/25/17 18:45 Dose:  550 mg


Sevelamer HCl (Renagel)  1,600 mg PO TID [facility]


   Last Admin: 05/25/17 18:45 Dose:  1,600 mg


Sodium Chloride (Sodium Chloride 7% Inhalation)  4 ml IH [id number] [facility]


   Last Admin: 05/24/17 15:25 Dose:  Not Given











- Labs


Labs: 


 





 05/25/17 06:00 





 05/25/17 06:00 





 











PT  12.6 Seconds (9.9-11.8)  H  05/22/17  10:00    


 


INR  1.17  (0.93-1.08)  H  05/22/17  10:00    


 


APTT  26.8 Seconds (23.7-30.8)   05/18/17  07:00    














- Constitutional


Appears: Non-toxic, No Acute Distress





- Head Exam


Head Exam: NORMAL INSPECTION





- ENT Exam


ENT Exam: Mucous Membranes Moist





- Neck Exam


Neck Exam: absent: Lymphadenopathy, Meningismus





- Respiratory Exam


Respiratory Exam: Decreased Breath Sounds





- Cardiovascular Exam


Cardiovascular Exam: +S1, +S2





- GI/Abdominal Exam


GI & Abdominal Exam: Soft.  absent: Tenderness





Assessment and Plan





- Assessment and Plan (Free Text)


Plan: 





Assessment


Severe sepsis S/P  ventilator-dependent respiratory failure and improving acute 

liver failure/acute fulminant hepatitis due to bilateral healthcare-associated 

pneumonia with sputum cx growing MRSA; need to rule out C. diff. infection


HTN


ESRD on HD


dyslipidemia


morbid obesity with BMI 45 


positive HIV JEANNETTE test with negative HIV-1 PCR





Plan


continue Zyvox and will d/c Merrem (day 10 to complete 7-10 days of therapy);  

reviewed CT scan which showed the bilateral consolidations and infiltrates in 

the lungs; reviewed ultrasound of the abdomen which showed the fatty liver and 

hepatomegaly - liver enzymes are trending down and bilirubin as well


continue PO vancomycin and d/c IV flagyl (day 6 of 10)


patient has history of multiple positive HIV JEANNETTE, even the 4th generation 

test - HIV-1 PCR still negative - may be a false positive test 


Follow up fungal and mycobacterial work up 


will continue to monitor clinically

## 2017-05-25 NOTE — RAD
HISTORY:

pneumonia  



COMPARISON:

05/24/2017 



FINDINGS:



LUNGS:

There is a diffuse infiltrate right greater than left. This could 

represent pulmonary edema versus pneumonia.



PLEURA:

No significant pleural effusion identified, no pneumothorax apparent.



CARDIOVASCULAR:

Moderate cardiomegaly



OSSEOUS STRUCTURES:

No significant abnormalities.



VISUALIZED UPPER ABDOMEN:

Normal.



OTHER FINDINGS:

None.



IMPRESSION:

Increasing diffuse infiltrate right greater than left, pulmonary 

edema versus pneumonia

## 2017-05-25 NOTE — PN
DATE: 05/25/2017



SUBJECTIVE:  The patient is a 61-year-old male transferred to the regular floor 
from CCU.  He presented with fulminant hepatitis, hepatic encephalopathy, was 
intubated for respiratory failure, successfully extubated now.  Hemoglobin 
declined to 7.7 today.  He has leukocytosis, white count elevated up to 70,000.
  White count is declining.  Flow cytometry was negative for malignant cells.  
BCR-ABL negative for CML.  He is confused.  He has generalized weakness, cannot 
hold the water glass himself.  Sensorium is altered also.  He is awake and 
alert.



MEDICATIONS:  Reviewed.



REVIEW OF SYSTEMS:  As per HPI.  Rest of 12-point review of systems reviewed 
and negative.



PHYSICAL EXAMINATION:

GENERAL:  Comfortable in bed, in no acute distress.

VITAL SIGNS:  Temperature 98.7, heart rate 60 per minute, respiratory 15 per 
minute, blood pressure 140/70.

NECK:  Normal.

CHEST:  Air entry present, equal bilateral.  No added sounds.

CARDIOVASCULAR:  Within normal limits.

ABDOMEN:  Soft, nontender, no hepatosplenomegaly.

EXTREMITIES:  No edema.

NEUROLOGIC:  Awake, alert, oriented, confused, weakness in the upper extremity, 
cannot hold things.

SKIN:  No petechia, no rash.



LABORATORY DATA:  White count 10.7, hemoglobin 7.8, hematocrit 23.7, platelet 
count 219, creatinine 4.6.  Electrolytes normal.



ASSESSMENT:

1.  Leukocytosis, resolved.

2.  Severe anemia.

3.  End-stage renal disease on hemodialysis.

4.  Fulminant hepatitis.

5.  Delirium.



PLAN:  He was planned to receive 1 unit of blood transfusion.  We will maintain 
hemoglobin around 8 g/dL.  He might need erythropoietin support to maintain the 
normal hemoglobin because of end-stage renal disease.  Blood pressure 
controlled on current medications.  Hepatitis resolving.  Respiratory status 
improved.





__________________________________________

Saira Olivo MD







cc:   



DD: 05/25/2017 19:10:59  [id number]

TT: 05/25/2017 19:38:59

Confirmation # [id number]

Dictation # [id number]

ln

MTDD

## 2017-05-25 NOTE — PN
DATE: 05/24/2017



ADDENDUM:  This is an addendum to the GI progress report dictated by Dr. Tirado.



SUBJECTIVE:  The patient remains on vent liver failure.  Appears to be improving transaminases and im
proving INR.  The patient did have pancreatitis and lipase has been improving.  



PHYSICAL EXAMINATION:  

ABDOMEN:  The patient does have some tenderness in the epigastric area.

LUNGS:  Has bilateral few rhonchi.



Attempted bronchoscopy today. 



Thank you very much for allowing us to participate in the care of the patient.





__________________________________________

Leydi Morales MD







cc:



DD: 05/25/2017 00:22:03  [id number]

TT: 05/25/2017 01:05:44

Confirmation # [id number]

Dictation # [id number]

mn

## 2017-05-25 NOTE — CP.PCM.PCO
Physician Communication Note





- Physician Communication Note


Physician Communication Note: Extubated-confused/No surgery now

## 2017-05-25 NOTE — PN
DATE: 05/25/2017



REASON FOR CONSULTATION AND FOLLOWUP:  Status post respiratory failure, acute liver failure, improved
 status post extubated, nonobstructive coronary artery disease by catheterization.



BRIEF CLINICAL HISTORY:  This is a 61-year-old morbidly obese male with past medical history signific
ant for diabetes, hypertension, hyperlipidemia, end-stage renal disease on dialysis, cardiac catheter
ization 01/____, nonobstructive coronary artery disease, who admitted with shortness of breath, furth
er respiratory status deteriorated requiring intubation, found to be in acute liver failure, AST was 
7000 and up.  Liver function improved.  The patient was successfully extubated yesterday.  Awake and 
alert.  History of cardiac catheterization, as mentioned, 01/____, nonobstructive coronary artery dis
ease.



PHYSICAL EXAMINATION:

VITAL SIGNS:  Temperature afebrile, heart rate 99, blood pressure 152/71.

HEENT:  PERRLA.  Extraocular muscles intact.

NECK:  Supple.  No carotid bruits, no thyromegaly.

CHEST:  Clear to auscultation.

HEART:  S1, S2 regular.

ABDOMEN:  Soft.

EXTREMITIES:  Clubbing and cyanosis negative.



BLOOD WORKUP:  As follows:  WBC 10.7, hemoglobin 7.____, hematocrit 23.9, platelet count 119.  Chemis
try shows sodium 130, potassium 4, chloride 96, carbon dioxide 26, anion gap of 20, BUN 44, creatinin
e 4.6.



IMPRESSION:  Acute liver failure, improved; status post respiratory failure, yesterday was successful
ly extubated; history of cardiac catheterization 01/____, nonobstructive coronary artery disease; mor
bid obesity, hypertension; end-stage renal disease, on dialysis; positive troponin secondary to hemod
ynamic instability, nonobstructive coronary artery disease history.



RECOMMENDATION:  Continue dialysis.  Continue to monitor liver function.  Avoid nephrotoxic medicatio
n.  Will continue antihypertensive medication before.  Will follow with you.  Will start again amlodi
pine and 10 mg of Ramipril for blood pressure, and continue hydralazine p.r.n.



Thank you, Dr. Rae, for providing the opportunity in taking care of the patient.





__________________________________________

Sunil Thompson MD







cc:



DD: 05/25/2017 09:33:03  [id number]

TT: 05/25/2017 10:07:42

Confirmation # [id number]

Dictation # [id number]

mn

## 2017-05-26 LAB
ADD MANUAL DIFF?: NO
ALBUMIN/GLOB SERPL: 0.8 {RATIO}
ALP SERPL-CCNC: 117 U/L
ALT SERPL-CCNC: 141 U/L
ASPERGILLUS TERREUS IGE AB [UNITS/VOLUME] IN SERUM: NOT DETECTED
AST SERPL-CCNC: 75 U/L
B19V IGG SER IA-ACNC: 0.9
B19V IGM SER IA-ACNC: 0.1
BASOPHILS # BLD AUTO: 0.01 K/MM3
BASOPHILS NFR BLD: 0.1 %
BILIRUB SERPL-MCNC: 2.1 MG/DL
BUN SERPL-MCNC: 73 MG/DL
CALCIUM SERPL-MCNC: 9.5 MG/DL
CHLORIDE SERPL-SCNC: 95 MMOL/L
CO2 SERPL-SCNC: 22 MMOL/L
EOSINOPHIL # BLD: 0 10*3/UL
EOSINOPHIL NFR BLD: 0 %
ERYTHROCYTE [DISTWIDTH] IN BLOOD BY AUTOMATED COUNT: 20 %
GLOBULIN SER-MCNC: 4.6 GM/DL
GLUCOSE SERPL-MCNC: 313 MG/DL
GRANULOCYTES # BLD: 6.3 10*3/UL
GRANULOCYTES NFR BLD: 77.9 %
HCT VFR BLD CALC: 24.7 %
LIPASE SERPL-CCNC: 814 U/L
LYMPHOCYTES # BLD: 1 10*3/UL
LYMPHOCYTES NFR BLD AUTO: 12.2 %
MAGNESIUM SERPL-MCNC: 2.2 MG/DL
MCH RBC QN AUTO: 30.1 PG
MCHC RBC AUTO-ENTMCNC: 33.2 G/DL
MCV RBC AUTO: 90.8 FL
MONOCYTES # BLD AUTO: 0.8 10*3/UL
MONOCYTES NFR BLD: 9.8 %
PHOSPHATE SERPL-MCNC: 7.9 MG/DL
PLATELET # BLD: 117 10^3/UL
PMV BLD AUTO: 9.7 FL
POTASSIUM SERPL-SCNC: 4.3 MMOL/L
PROT SERPL-MCNC: 8.1 G/DL
SODIUM SERPL-SCNC: 132 MMOL/L
WBC # BLD AUTO: 8.1 10^3/UL

## 2017-05-26 NOTE — PN
DATE: 05/26/2017



The patient has been satisfactorily extubated for 24 hours.  He is coughing very well and still has r
adiographic evidence of bilateral MRSA pneumonia.  His initial acute hepatic injury is responding slo
wly and improving and his chronic kidney disease is managed by hemodialysis.



The episode where he developed an inability to be ventilated and large mucous plugs were removed and 
a pneumomediastinum-pericardium were initially seen are now fully resolved at this point.



He is communicating better today than yesterday when he was markedly confused and he has liquid bowel
 movements and is describing minimal abdominal discomfort at this point.  We hope that with the clear
ing of his mentation, he would be able to give us some more information as to what happened that migh
t have precipitated his admission.  At this point, his prognosis has reversed and is improving very s
ignificantly and we are now modestly hopeful that he might recover and be able to be discharged home.




This dictation will be electronically signed without being read.





__________________________________________

Miguel Allan MD







cc:



DD: 05/26/2017 13:14:23  [phone]

TT: 05/26/2017 13:48:30

Confirmation # [id number]

Dictation # [id number]

lakshmi

## 2017-05-26 NOTE — CP.PCM.PN
Subjective





- Date & Time of Evaluation


Date of Evaluation: 05/26/17


Time of Evaluation: 09:35





- Subjective


Subjective: 


Comfortable, afebrile, less diarrhea.





Objective





- Vital Signs/Intake and Output


Vital Signs (last 24 hours): 


 











Temp Pulse Resp BP Pulse Ox


 


 98.8 F   88   20   150/72   97 


 


 05/26/17 16:00  05/26/17 16:00  05/26/17 16:00  05/26/17 16:00  05/26/17 16:00








Intake and Output: 


 











 05/26/17 05/26/17





 06:59 18:59


 


Intake Total 480 240


 


Output Total 0 


 


Balance 480 240














- Medications


Medications: 


 Current Medications





Albuterol/Ipratropium (Duoneb 3 Mg/0.5 Mg (3 Ml) Ud)  3 ml IH Q2H PRN


   PRN Reason: Shortness of Breath


   Last Admin: 05/23/17 21:25 Dose:  3 ml


Albuterol/Ipratropium (Duoneb 3 Mg/0.5 Mg (3 Ml) Ud)  3 ml IH [id number] [facility]


   Last Admin: 05/26/17 16:06 Dose:  3 ml


Amlodipine Besylate (Norvasc)  10 mg PO DAILY [facility]


   Last Admin: 05/26/17 14:50 Dose:  10 mg


Heparin Sodium (Porcine) (Heparin)  5,000 units SC Q12 [facility]


   PRN Reason: Protocol


   Last Admin: 05/26/17 10:21 Dose:  Not Given


Hydralazine HCl (Apresoline)  10 mg IVP Q6 PRN


   PRN Reason: SBP >180


   Last Admin: 05/17/17 02:05 Dose:  10 mg


Linezolid (Zyvox 600mg/300ml D5w)  600 mg in 300 mls @ 200 mls/hr IVPB Q12 [facility]


   PRN Reason: Protocol


   Stop: 05/26/17 22:01


   Last Admin: 05/26/17 11:00 Dose:  Not Given


Fentanyl Citrate (Fentanyl Citrate/Sodium Chloride 1 Mg/100 Ml)  1,000 mcg in 

100 mls @ 10 mls/hr IV .Q10H PRN; Protocol; 100 MCG/HR


   PRN Reason: TITRATE PER MD ORDER


   Last Titration: 05/22/17 15:00 Dose:  0 mcg/hr, 0 mls/hr


Propofol (Diprivan)  1,000 mg in 100 mls @ 3.633 mls/hr IV .Q24H PRN; Protocol; 

5 MCG/KG/MIN


   PRN Reason: TITRATE PER MD ORDER


   Last Titration: 05/24/17 08:30 Dose:  0 mcg/kg/min, 0 mls/hr


Insulin Detemir (Levemir)  20 unit SC DAILY [facility]


   Last Admin: 05/26/17 11:00 Dose:  Not Given


Insulin Human Regular (Humulin R Med)  0 units SC Q4 [facility]


   Last Admin: 05/26/17 12:30 Dose:  Not Given


Lisinopril (Zestril)  5 mg PO DAILY [facility]


   Last Admin: 05/26/17 14:50 Dose:  5 mg


Methylprednisolone (Solu-Medrol)  20 mg IVP Q12 [facility]


   Last Admin: 05/26/17 11:00 Dose:  Not Given


Midazolam HCl (Versed Inj)  5 mg IVP Q2H PRN


   PRN Reason: Sedation


   Last Admin: 05/24/17 00:50 Dose:  5 mg


Ondansetron HCl (Zofran Inj)  4 mg IVP Q6H PRN


   PRN Reason: Nausea/Vomiting


Pantoprazole Sodium (Protonix Inj)  40 mg IVP DAILY [facility]


   Last Admin: 05/26/17 14:50 Dose:  40 mg


Rifaximin (Xifaxan)  550 mg PO BID [facility]


   PRN Reason: Protocol


   Last Admin: 05/26/17 10:22 Dose:  Not Given


Sevelamer HCl (Renagel)  1,600 mg PO TID [facility]


   Last Admin: 05/26/17 14:51 Dose:  1,600 mg


Sodium Chloride (Sodium Chloride 7% Inhalation)  4 ml IH [id number] [facility]


   Last Admin: 05/24/17 15:25 Dose:  Not Given


Vancomycin HCl (Vancocin 25 Mg/Ml (Oral Use))  125 mg PO QID [facility]


   PRN Reason: Protocol


   Last Admin: 05/26/17 14:50 Dose:  125 mg











- Labs


Labs: 


 





 05/26/17 06:30 





 05/26/17 06:30 





 











PT  12.6 Seconds (9.9-11.8)  H  05/22/17  10:00    


 


INR  1.17  (0.93-1.08)  H  05/22/17  10:00    


 


APTT  26.8 Seconds (23.7-30.8)   05/18/17  07:00    














- Constitutional


Appears: Non-toxic, No Acute Distress





- Head Exam


Head Exam: NORMAL INSPECTION





- ENT Exam


ENT Exam: Mucous Membranes Moist





- Neck Exam


Neck Exam: absent: Lymphadenopathy, Meningismus





- Respiratory Exam


Respiratory Exam: Decreased Breath Sounds





- Cardiovascular Exam


Cardiovascular Exam: +S1, +S2





- GI/Abdominal Exam


GI & Abdominal Exam: Soft.  absent: Tenderness





Assessment and Plan





- Assessment and Plan (Free Text)


Plan: 





Assessment


Severe sepsis with ventilator-dependent respiratory failure and acute liver 

failure/acute fulminant hepatitis due to bilateral healthcare-associated 

pneumonia with sputum cx now growing MRSA; need to rule out C. diff. infection, 

clinically improved


HTN


ESRD on HD


dyslipidemia


morbid obesity with BMI 45 


positive HIV JEANNETTE test with negative HIV-1 PCR





Plan


will d/c Zyvox and Merrem (day 10 of 10); reviewed CT scan which showed the 

bilateral consolidations and infiltrates in the lungs; reviewed ultrasound of 

the abdomen which showed the fatty liver and hepatomegaly - liver enzymes are 

trending down and bilirubin as well


continue PO vancomycin (day 6 of 10)


patient has history of multiple positive HIV JEANNETTE, even the 4th generation 

test - HIV-1 PCR still negative - may be a false positive test

## 2017-05-26 NOTE — PN
DATE: 05/26/2017



SUBJECTIVE:  The patient is a 61-year-old, seen and examined, lying in bed, seems to be comfortable. 
 He was seen while he was in dialysis.



PHYSICAL EXAMINATION:

GENERAL:  Fully awake, alert, oriented, communicative, answers appropriately.

VITAL SIGNS:  He is afebrile, pulse 80, respirations 20, blood pressure 150/72.

LUNGS:  Bilateral fair airflow, no rhonchi or crackle.

HEART:  S1, S2 audible.

ABDOMEN:  Soft, obese, nontender, no rebound, no guarding.

NEUROLOGIC:  The patient is awake and alert, answers appropriately.

EXTREMITIES:  Bilateral legs, no edema.



LABORATORY EXAMINATION:  WBC is 8.1, hemoglobin 8.2, hematocrit 24.7, platelet of 117.  Chemistry:  S
odium 132, potassium 4.3, chloride 95, CO2 22, BUN 73, creatinine 6.8, blood sugar of 313.



ASSESSMENT:

1.  Status post hepatic failure, improving.

2.  Status post respiratory failure.

3.  Status post methicillin-resistant Staphylococcus aureus pneumonia.

4.  Metabolic encephalopathy.

5.  Anemia, status post blood transfusion.

6.  End-stage renal disease, on hemodialysis.



PLAN:  Currently, the patient is on lisinopril 5 mg daily.  He is receiving Xifaxan.  Cut down his st
eroid.  He is getting Renagel.  Change his Protonix to 40 p.o. daily.  Continue nebulizer treatment. 
 Get physical therapy evaluation and decide if he needs TCU or subacute rehab.





__________________________________________

Antonieta Rae MD







cc:



DD: 05/26/2017 19:07:08  [id number]

TT: 05/26/2017 20:21:27

Confirmation # [id number]

Dictation # [id number]

lakshmi

## 2017-05-26 NOTE — RAD
HISTORY:

pneumonia  



COMPARISON:

05/25/2017 



FINDINGS:



LUNGS:

There is improvement in the extensive bilateral alveolar infiltrates. 

The infiltrate now has a more perihilar appearance which is 

suggestive of CHF.



PLEURA:

No significant pleural effusion identified, no pneumothorax apparent.



CARDIOVASCULAR:

Normal.



OSSEOUS STRUCTURES:

No significant abnormalities.



VISUALIZED UPPER ABDOMEN:

Normal.



OTHER FINDINGS:

None.



IMPRESSION:

There is improvement in the extensive bilateral alveolar infiltrates. 

The infiltrate now has a more perihilar appearance which is 

suggestive of CHF.

## 2017-05-26 NOTE — PN
DATE: 05/26/2017



The patient in room 564, bed 1.



REASON FOR CONSULTATION AND FOLLOWUP:  Status post respiratory failure, acute liver failure, improved
, status post extubated, nonobstructive coronary artery disease.



HISTORY OF PRESENT ILLNESS:  The patient is a 61-year-old morbidly obese male with past medical histo
ry significant for diabetes, hypertension, hyperlipidemia, end-stage renal disease on dialysis, cardi
ac catheterization on 1/12/2017 which showed nonobstructive coronary artery disease.  Was admitted wi
th shortness of breath and respiratory status deteriorated and he needed to be intubated.  The patien
t also was found to be in acute liver failure.  AST was 7000 and up.  Liver functions improved.  The 
patient's respiratory status improved.  He was successfully extubated.  The patient denies any chest 
pain.  His shortness of breath is better.  Denies any palpitation.  The patient has renal failure, on
 dialysis.



PHYSICAL EXAMINATION:

VITAL SIGNS:  Blood pressure is 157/68, respirations 20, pulse rate 84, temperature 99.

HEAD:  Normocephalic.

EYES:  Pupils normal.  Conjunctivae slightly pale.

NECK:  JVP low.  Carotid equal.

THORAX:  AP diameter normal.

LUNGS:  No significant rales.

CARDIOVASCULAR:  S1, S2.

ABDOMEN:  Protuberant, no organomegaly.

EXTREMITIES:  No clubbing, no cyanosis.



LABORATORY DATA:  WBC 8.1, hemoglobin 8.2, hematocrit 24.7, platelet 117.  Sodium 132, potassium 4.3,
 BUN 73, creatinine 6.8, random glucose 313, calcium 9.5, phosphorus 7.9, magnesium 2.2, total biliru
bin 2.1, AST 75, , total protein 8.1, albumin 3.5.  On 5/22, prothrombin time 12.6, INR 1.17. 
 Chest x-ray on 5/26 shows improvement, but still has changes consistent with CHF.



DIAGNOSES:  Acute liver failure, status post respiratory failure, status post extubation, congestive 
heart failure, nonobstructive coronary artery disease, morbid obesity, hypertension, end-stage renal 
failure on dialysis, positive troponin secondary to hemodynamic instability.



PLAN:  Continue dialysis with negative fluid.  Chest x-ray still showing changes consistent with colt
estive heart failure.  The patient on DuoNeb hand nebulizer therapy, heparin 5000 units subQ q. 12 ho
urs, insulin as ordered, Levophed drip, amlodipine 10 mg daily, Solu-Medrol 20 mg IV b.i.d., vancomyc
in 125 mg p.o. q.i.d., Zestril 5 mg daily, linezolid 600 mg in D5W q. 12 hours.  We will continue pre
sent therapy and we will follow with you.





__________________________________________

Sunil Sanchez MD







cc:



DD: 05/26/2017 12:58:27  [phone]

TT: 05/26/2017 13:16:00

Confirmation # [id number]

Dictation # [id number]

en

## 2017-05-26 NOTE — PN
DATE: 05/26/2017



SUBJECTIVE:  The patient is seen in the dialysis unit.  He is awake, he is alert.  He recognizes me, 
but he is confused.  He is oriented to place and person.



PHYSICAL EXAMINATION:

GENERAL:  Obese, elderly male, lying in bed, in the dialysis unit.

VITAL SIGNS:  Blood pressure 147/68, heart rate 84, respiratory rate 20, temperature 99, T-max 99.1.

HEENT:  Normocephalic, atraumatic.

NECK:  Supple, no JVD.

LUNGS:  Bilateral rhonchi, bilateral rales.

CARDIAC:  S1, S2, regular rate and rhythm, no murmur, no rub.

ABDOMEN:  Obese, distended, soft, nontender, bowel sounds present.

EXTREMITIES:  No lower extremity edema.



LABORATORY DATA:  WBC 8.1, hemoglobin 8.2, hematocrit 25, platelets 117.  Sodium 132, potassium 4.3, 
chloride of 95, CO2 22, BUN 73, creatinine 6.8, glucose 313, calcium 9.5, phosphorus 7.9, magnesium 2
.2, total bili 2.1, AST 75, .  Hemoglobin A1c 5.6.  Albumin 3.5.  Bronchial culture, gram-posi
tive cocci.  Sputum culture, MRSA.  Chest x-ray, improvement in extensive bilateral alveolar infiltra
aguilar.  Now, the infiltrate is mostly perihilar.



CURRENT MEDICATIONS:  List reviewed.



ASSESSMENT:

1.  Status post respiratory failure, extensive bilateral methicillin-resistant Staphylococcus aureus 
pneumonia.

2.  Altered mental status, still confused.

3.  Severe anemia.

4.  Resolving acute fulminant hepatitis.

5.  Hypertension.

6.  End-stage renal disease.



PLAN:

1.  Stable dialysis treatment, status post 1 unit of PRBC yesterday.

2.  Continue antibiotics for methicillin-resistant Staphylococcus aureus pneumonia.

3.  Continue phosphate binders.

4.  Physical therapy.

5.  Will need subacute rehab.





__________________________________________

Lavonne Maria MD







cc:



DD: 05/26/2017 14:02:45  [phone]

TT: 05/26/2017 14:22:51

Confirmation # [id number]

Dictation # [id number]

en

## 2017-05-26 NOTE — PN
DATE: 05/25/2017



ADDENDUM:  This is an addendum to the GI progress report dictated by Dr. Tirado. 



The patient was seen and evaluated earlier today.  The patient is off the ventilator.  LFTs are showi
ng improving trend.  Followup of the LFTs.  Cardiac enzymes are also showing a downward trend.  

  

On examination, the abdomen is soft, there is mild tenderness present in the epigastric area.    

  

RECOMMENDATIONS:  Would recommend at this point followup of the LFTs, continue the antibiotics as per
 ID.  The patient does have bilateral _____ pneumonia.  



Thank you very much for allowing us to participate in the care of the patient.





__________________________________________

Leydi Morales MD







cc:



DD: 05/26/2017 00:54:12  [id number]

TT: 05/26/2017 02:29:49

Confirmation # [id number]

Dictation # [id number]

an

## 2017-05-27 LAB
ADD MANUAL DIFF?: NO
ALBUMIN/GLOB SERPL: 0.8 {RATIO}
ALP SERPL-CCNC: 123 U/L
ALT SERPL-CCNC: 108 U/L
AST SERPL-CCNC: 62 U/L
BASOPHILS # BLD AUTO: 0.01 K/MM3
BASOPHILS NFR BLD: 0.1 %
BILIRUB SERPL-MCNC: 1.7 MG/DL
BUN SERPL-MCNC: 44 MG/DL
CALCIUM SERPL-MCNC: 9.2 MG/DL
CD16+CD56+ CELLS NFR BLD: 6 PERCENT
CD19 CELLS NFR BLD: 26 PERCENT
CD3 CELLS NFR BLD: 66 PERCENT
CHLORIDE SERPL-SCNC: 97 MMOL/L
CO2 SERPL-SCNC: 25 MMOL/L
EOSINOPHIL # BLD: 0.1 10*3/UL
EOSINOPHIL NFR BLD: 1.8 %
ERYTHROCYTE [DISTWIDTH] IN BLOOD BY AUTOMATED COUNT: 19.5 %
GLOBULIN SER-MCNC: 4.4 GM/DL
GLUCOSE SERPL-MCNC: 309 MG/DL
GRANULOCYTES # BLD: 4.72 10*3/UL
GRANULOCYTES NFR BLD: 63.6 %
HCT VFR BLD CALC: 27 %
IRON SERPL-MCNC: 56 UG/DL
LYMPHOCYTES # BLD: 1.7 10*3/UL
LYMPHOCYTES NFR BLD AUTO: 22.8 %
Lab: 52 CELLS/MCL
MAGNESIUM SERPL-MCNC: 2 MG/DL
MCH RBC QN AUTO: 30.3 PG
MCHC RBC AUTO-ENTMCNC: 32.6 G/DL
MCV RBC AUTO: 93.1 FL
MONOCYTES # BLD AUTO: 0.9 10*3/UL
MONOCYTES NFR BLD: 11.7 %
PHOSPHATE SERPL-MCNC: 3.9 MG/DL
PLATELET # BLD: 134 10^3/UL
PMV BLD AUTO: 10.4 FL
POTASSIUM SERPL-SCNC: 3.1 MMOL/L
PROT SERPL-MCNC: 7.8 G/DL
SODIUM SERPL-SCNC: 133 MMOL/L
WBC # BLD AUTO: 7.4 10^3/UL

## 2017-05-27 NOTE — PN
DATE: 05/27/2017



SUBJECTIVE:  The patient is currently seen on 5R.  He is lying comfortable in bed.  He continues on a
ntibiotic therapy for his Staph aureus pneumonia.  The patient states that he has noted significant w
eakness in his muscles and would like to start aggressive physical therapy as he has been bedridden f
or the last 12 days.  The patient had an uneventful dialysis yesterday.



MEDICATIONS:  Medication list reviewed.  The patient is currently on p.r.n. hydralazine, DuoNeb, hepa
rin, insulin, Norvasc, prednisone, Protonix, Renagel, vancomycin orally, Xifaxan, Zestril, Zofran.  SHA powell has completed a course of Zyvox.



OBJECTIVE:

INTAKE AND OUTPUT:  Intake 600, output hemodialysis.

VITAL SIGNS:  Blood pressure 150/72, temperature 98.8, heart rate is 88 with a respiratory rate of 20
.  Pulse ox is 97%.

HEENT:  Normocephalic, atraumatic.  Conjunctivae are pale.  Sclerae are nonicteric.

NECK:  Supple, no neck vein distention.

CHEST:  Slight decreased breath sounds at the bases.  No rales, no rhonchi, no wheezing.

CARDIOVASCULAR:  Shows a normal S1, S2, mitral regurgitation/tricuspid regurgitation.  No rub.  No S3
, no S4.

ABDOMEN:  Soft.  Mild distention, mild obesity.  Bowel sounds normal.  No rebound, no guarding, no ma
sses.

EXTREMITIES:  No lower extremity edema.  Positive fistula, left upper extremity, positive thrill, pos
itive bruit.



LABORATORY DATA AND IMAGING:  CBC from yesterday shows a white blood cell count which is down to 8.1,
 hemoglobin 8.2.  Platelet count is 117,000.  Chemistries show normal electrolytes.  BUN 73 with a cr
eatinine of 6.8.  Glucose was elevated at 313 predialysis yesterday.  Calcium 9.5 with a phosphorus o
f 7.9.  The patient remains on binders.  Magnesium 2.2.  Bilirubin is down to 2.1, AST down to 75, AL
T down to 141.  Lipase is down to 814 from a high of 20,477.  Microbiology:  Sputum cultures are posi
tive for MRSA.  C. diff was negative.



ASSESSMENT:

1.  Respiratory failure secondary to methicillin-resistant Staph aureus pneumonia.  The patient appea
rs to have completed a course of Zyvox.

2.  Diarrhea appears to have resolved.  The patient was empirically treated for Clostridium difficile
 colitis.  Clostridium difficile toxin; however, was negative.  He continues on oral vancomycin.

3.  Acute pancreatitis and possible acute cholecystitis.  The patient's pancreatic enzymes are fallin
g to normal.  The patient had been seen by surgery and electively will likely need to have his gallbl
adder removed.

4.  Severe transaminitis in the setting of acute fulminant hepatitis, this appears to be resolving ni
emanuel.

5.  Leukocytosis, improved.

6.  History of severe anemia.  The patient continues to receive blood transfusions on an as needed ba
sis.  His hemoglobin is stable in the low to mid-8 range.  The patient continues maximum dose of Kristofer
sandra.

7.  History of end-stage renal disease.  The patient will continue routine Monday, Wednesday, Friday 
dialysis.

8.  History of severe secondary hyperparathyroidism.  Phosphorus levels remain elevated.  The patient
 is back on binder therapy 1600 mg of Renagel with each meal.  In the outpatient setting, the patient
 had been on Auryxia.

9.  Severe deconditioning.  The patient needs aggressive physical therapy as he has been bedridden fo
r the last 12 days and states he would likely have difficulty ambulating.



PLAN:

1.  Continue Monday, Wednesday, Friday dialysis.

2.  The patient appears to have completed a course of antibiotic therapy for this MRSA pneumonia.

3.  Continue sliding scale insulin and adjust long acting insulin.

4.  Continue a renal diet and continue binder therapy.

5.  Intensive physical therapy to get the patient out of bed and to see whether or not he can ambulat
e on his own.





__________________________________________

Martin Hardy MD







cc:



DD: 05/27/2017 08:26:17  [id number]

TT: 05/27/2017 08:52:32

Confirmation # [id number]

Dictation # [id number]

helen

## 2017-05-27 NOTE — PN
DATE: 05/27/2017



SUBJECTIVE:  The patient is a 61-year-old, seen and examined, lying in bed, comfortable, eating and t
olerating.  No nausea, vomiting, no diarrhea, no fever, no chills, no abdominal pain.  He has general
ized weakness.  Physical therapy evaluated the patient.  He is too weak to walk; however, he has no f
ocal deficit.



PHYSICAL EXAMINATION:

VITAL SIGNS:  He is afebrile, pulse 96, respirations 20, blood pressure 162/78.

LUNGS:  Bilateral fair airflow, no rhonchi or crackle.

HEART:  S1, S2 audible.

ABDOMEN:  Soft, nontender, no rebound, no guarding.

NEUROLOGIC:  The patient is awake and alert, communicative.  Moves all extremities, just has generali
zed weakness.



LABORATORY EXAMINATION:  WBC 7.4, hemoglobin 8.8, hematocrit 27.0, platelet of 134.  Chemistry:  Sodi
um 133, potassium 3.1, chloride 97, CO2 44, BUN ____, creatinine ____.



ASSESSMENT:

1.  Status post respiratory failure secondary to methicillin-resistant Staphylococcus aureus in sputu
m

2.  Gallstone pancreatitis with acute hepatic failure.

3.  Resolved cholecystitis.

4.  Status post liver failure, improved.

5.  Leukocytosis that has improved.

6.  Chronic anemia, status post blood transfusion.

7.  End-stage renal disease, on hemodialysis.



PLAN:  Currently, the patient is on nebulizer treatment.  We will continue that.  He is on DVT prophy
laxis.  His blood sugar is being monitored.  He is on Norvasc.  He is on p.o. small dose of prednison
e that we will taper down slowly.  He is on p.o. vancomycin empirically and Xifaxan.  We will discuss
 with  to make arrangement for subacute rehab.





__________________________________________

Antonieta Rae MD







cc:



DD: 05/27/2017 11:51:05  [id number]

TT: 05/27/2017 12:14:01

Confirmation # [id number]

Dictation # [id number]

tn

## 2017-05-27 NOTE — PN
DATE: 05/27/2017



The patient is in bed in no acute distress, nontoxic.



PHYSICAL EXAMINATION:

VITAL SIGNS:  Temperature is 99, blood pressure is 160/70, respiratory rate of 16.

HEENT:  Unremarkable.

NECK:  Supple.

LUNGS:  Have decreased breath sounds.

HEART:  Normal S1, S2.

ABDOMEN:  Soft, nontender.



LABORATORY DATA:  Reveals a white count of 7.4, hemoglobin of 8 and platelets of 134.  BUN of 44, cre
atinine of 4.9.  CD4 count is noted at 362.



ASSESSMENT AND PLAN:  This is a 61-year-old male with severe sepsis with ventilator-dependent respira
tory failure, acute liver failure, acute fulminant hepatitis and bilateral healthcare-associated pneu
monia, sputum growing MRSA in a patient with hypertension, end-stage renal disease on hemodialysis an
d morbid obesity and history of multiple positive HIV _____ and fourth generation HIV test still nega
tive.  He may be a false positive.  On review of orders, the patient is on prednisone and p.o. vancom
ycin.  Will follow with you.





__________________________________________

Josh Ellis MD







cc:



DD: 05/27/2017 13:18:24  350

TT: 05/27/2017 14:33:59

Confirmation # [id number]

Dictation # [id number]

trupti

## 2017-05-28 NOTE — PN
DATE: 05/27/2017



This patient was seen and evaluated earlier today.  The patient had soft bowel 
movement.



PHYSICAL EXAMINATION:

VITAL SIGNS:  Temperature is 97.9, pulse 90, blood pressure is 170/80.

HEENT:  Atraumatic, anicteric.

NECK:  Supple.

HEART:  S1, S2 heard.

LUNGS:  Bilateral air entry present.  A few scattered rhonchi present.

ABDOMEN:  Softly distended.

EXTREMITIES:  Mild edema present.



LABORATORY DATA:  Hemoglobin 8, hematocrit 27.0, WBC 7.4, platelets 134.  BUN 44
, creatinine is 4.9.  The patient's bilirubin has continued to be downward trend
, 1.7.  AST 62, ALT is 108.



This is a 61-year-old patient admitted with sepsis, pneumonia.  Has acute liver 
failure, which is clinically improving.  Continue the antibiotics as per ID.  
The patient has gallstones, but the ultrasound showed common bile duct normal. 
The patient did have pancreatitis, but clinically improved, probably drug 
induced or sepsis for the etiology. 



Would recommend, patient is continue the antibiotics as per ID.  Presently, 
patient on rifaximin, Vancocin p.o. for Clostridium difficile.  The patient's 
last Clostridium difficile was negative.  Is empirically on rifaximin and also 
p.o. vancomycin.  The patient is clinically improving.  Now, we will continue 
to closely follow up his care and suggest further recommendation based upon 
clinical course.



Thank you very much for allowing us to participate in the care of the patient.





__________________________________________

Leydi Morales MD







cc:   



DD: 05/28/2017 01:00:03  [id number]

TT: 05/28/2017 08:52:45

Confirmation # [id number]

Dictation # [id number]

en

MTDD

## 2017-05-28 NOTE — PN
DATE: 05/28/2017



SUBJECTIVE:  The patient is currently seen on 5R.  He is complaining of feeling fatigued and tired, b
ut otherwise, no acute issues.  He is scheduled for a routine dialysis tomorrow.



MEDICATIONS:  Medication list reviewed.  The patient is currently on p.r.n. hydralazine, albuterol, s
ubQ heparin, insulin, amlodipine, prednisone, Protonix, Renagel, oral vancomycin, Xifaxan, Zestril, a
nd Zofran p.r.n.



OBJECTIVE:

INTAKE AND OUTPUT:  Intake 840, output 3000.

VITAL SIGNS:  Blood pressure 141/67, temperature 97.9, respiratory rate 20 with a pulse of 94.

HEENT:  Normocephalic, atraumatic.  Conjunctivae are pale.  Sclerae are nonicteric.

NECK:  Supple.  No neck vein distention.

CHEST:  Clear to auscultation and percussion with decreased breath sounds at the bases.  No rales, no
 rhonchi, no wheezing.

CARDIOVASCULAR:  Normal S1, S2, MR/TR.  No rub.  No S3, no S4.

ABDOMEN:  Soft.  Mild distention, mild obesity.  Bowel sounds normal.  No rebound, no guarding, no ma
sses, no midepigastric and right upper quadrant tenderness.

EXTREMITIES:  No lower extremity edema.  Positive fistula - left upper extremity, positive thrill, po
sitive bruit.



LABORATORY DATA AND IMAGING:  CBC:  White blood cell count from yesterday 7.4 with a hemoglobin of 8.
8 with a platelet count of 134,000.  These are all within baseline range.  Chemistries:  Potassium 3.
1 yesterday, BUN 44 with a creatinine of 4.9.  Glucose 309.  Iron saturation 32%.  Bilirubin 1.7 with
 an AST of 62 and an ALT of 108, continuing to normalize.  Albumin level is 3.4.  Microbiology:  Sput
um cultures are positive for MRSA and yeast.



ASSESSMENT:

1.  Status post respiratory failure secondary to methicillin-resistant Staphylococcus aureus pneumoni
a.  The patient has completed a course of Zyvox.

2.  Status post diarrhea.  No evidence for Clostridium difficile.  However, the patient is completing
 a course of oral vancomycin therapy.

3.  Acute pancreatitis and transaminitis with possible acute cholecystitis with gallstones.  The ashley
ent's liver enzymes are falling to normal.  The patient's pancreatic enzyme lipase is falling and is 
now down to 814 from a high of 20,477.

4.  History of severe anemia.  The patient's hemoglobin is stable at 8.8.  This is well within his ba
seline range.  The patient continues to receive maximum dose of Aranesp, and iron levels are all acce
ptable.

5.  History of end-stage renal disease.  The patient will continue Monday, Wednesday, Friday dialysis
.  As necessary, we will increase the potassium bath, as his K is borderline low.

6.  History of severe secondary hyperparathyroidism.  The patient's last phosphorus level was improve
d down to 3.9, back on binder therapy.  Calcium level was acceptable at 9.2.

7.  History of severe deconditioning.  The patient needs aggressive physical therapy, as he has been 
bedridden for the last 13 days.



PLAN:

1.  Monday, Wednesday, Friday dialysis.  Dialysis is scheduled for tomorrow.

2.  Again, we will increase the potassium dialysate if his potassium level is borderline low.

3.  Continue insulin therapy.

4.  Continue renal diet and binder therapy.

5.  Intensive physical therapy.  The patient needs to get out of bed and start ambulating.





__________________________________________

Martin Hardy MD







cc:



DD: 05/28/2017 08:21:52  [id number]

TT: 05/28/2017 10:02:30

Confirmation # [id number]

Dictation # [id number]

jn

## 2017-05-28 NOTE — PN
DATE: 05/28/2017



The patient is in bed, in no acute distress, nontoxic.



PHYSICAL EXAMINATION:

VITAL SIGNS:  Temperature is 98, blood pressure is 140/60, respiratory rate of 16.

HEENT:  Unremarkable.

NECK:  Supple.

LUNGS:  Have decreased breath sounds.

HEART:  Normal S1, S2.

ABDOMEN:  Soft.



LABORATORY EXAMINATION:  Reveals the patient's white count is 7.4, hemoglobin of 8, platelets of 134.
  Chemistries reveals the BUN of 44, creatinine of 4.9.  Microbiology is noted.



Review of orders reveals the patient to be on p.o. vancomycin, p.o. prednisone.



ASSESSMENT AND PLAN:  A 61-year-old male with severe sepsis, status post ventilatory dependent respir
atory failure, acute liver failure and fulminant hepatitis, bilateral healthcare-associated pneumonia
 with methicillin-resistant Staphylococcus aureus and hypertension, end-stage renal disease on hemodi
alysis and morbidly obesity, history of multiple positive human immunodeficiency virus fourth generat
ion tests, still negative, may be a false positive test, JEANNETTE, on p.o. vancomycin and prednisone and
 stool for Clostridium difficile from 5/19 is negative antigen and negative toxin.  Will complete a s
hort course of p.o. vancomycin.





__________________________________________

Josh Ellis MD







cc:



DD: 05/28/2017 10:22:28  350

TT: 05/28/2017 11:51:15

Confirmation # [id number]

Dictation # [id number]

en

## 2017-05-28 NOTE — PN
DATE: 05/28/2017



REASON FOR CONSULTATION AND FOLLOWUP:  Status post respiratory failure, acute liver failure, improved
, status post intubated, nonobstructive coronary artery disease.



BRIEF CLINICAL HISTORY:  This is a 61-year-old morbidly obese male with past medical history signific
ant for diabetes, hypertension, hyperlipidemia, end-stage renal disease on dialysis, status post card
iac catheterization 1/12/2017, nonobstructive coronary artery disease.  Admitted because of shortness
 of breath, found to be acute liver failure.  AST was 7000.  Now, patient successfully extubated.  No
w, liver function came back to normal.  Denies any chest pain, shortness of breath, any palpitation.



PHYSICAL EXAMINATION:

VITAL SIGNS:  Temperature afebrile, heart rate 94, blood pressure 147/67.

HEENT:  PERRLA.  Extraocular muscles intact.

NECK:  Supple.  No carotid bruit, no thyromegaly.

CHEST:  Clear to auscultation.

HEART:  S1, S2 regular.

ABDOMEN:  Soft.

EXTREMITIES:  Clubbing, cyanosis negative.



BLOOD WORKUP:  WBC 7.4, hemoglobin 8.8, hematocrit 27, platelet count 137.  Chemistry shows sodium 13
0, potassium 3.1, chloride 97, carbon dioxide 25, anion gap of 14, BUN 44, creatinine 4.9.



IMPRESSION:  Status post respiratory failure, acute liver failure, improved, nonobstructive coronary 
artery disease, status post cardiac catheterization in 1/2017, morbid obesity, diabetes, hypertension
, hyperlipidemia.



RECOMMENDATION:  Continue amlodipine.  Started lisinopril 10 mg daily.  Continue baby aspirin.  We wi
ll start baby aspirin.  We will follow with you.



Thank you, Dr. Rae, for providing us the opportunity in taking care of the patient.  Avoid any he
patotoxic medication.





__________________________________________

Sunil Thompson MD







cc:



DD: 05/28/2017 10:28:26  305

TT: 05/28/2017 11:47:05

Confirmation # [id number]

Dictation # [id number]

en

## 2017-05-28 NOTE — PN
DATE: 05/28/2017



DATE OF EVALUATION:  05/28/2017



SUBJECTIVE:  The patient is comfortable in bed, in no acute distress.  Sodium 
improved markedly.  He is more alert, oriented.  He is not talking.  He had 
dialysis today.  He had leukocytosis on admission.  White count has resolved to 
normal.  Hemoglobin and hematocrit has been stable.  Denies any complaints.  No 
breathing problem, no chest pain, no shortness of breath.



REVIEW OF SYSTEMS:  As per HPI.  Rest of 12-point review of systems reviewed 
and negative.



PHYSICAL EXAMINATION:

GENERAL:  Comfortably sitting in chair, in no acute distress.

VITAL SIGNS:  Temperature 97.8.  Heart rate 70 per minute, respiratory 18 per 
minute, blood pressure 130/70.

NECK:  Normal.

CHEST:  Air entry present, equal bilateral.  No added sound.

CARDIOVASCULAR:  Within normal limits.

ABDOMEN:  Soft, nontender, no hepatosplenomegaly.

EXTREMITIES:  No edema.

NEUROLOGIC:  Alert, oriented x 3, no focal sensorimotor deficit.

SKIN:  No petechia, no rash.



LABORATORY DATA:  From 05/27/2017, white count 7.5, hemoglobin 8.8, hematocrit 
27, platelet count 134.  Iron  56, saturation 32, potassium 3.1, glucose 355.



MEDICATIONS:  DuoNeb 3 mL q. 4 hours p.r.n., Norvasc 10 mg daily, aspirin 81 mg 
daily, heparin 5000 subQ q. 12, hydralazine 10 mg q. 6 hours p.r.n., Levemir 20 
units subQ daily, regular insulin plus sliding scale, lisinopril 10 mg daily, 
Zofran 4 mg IV q. 6 hours p.r.n., Protonix 40 mg daily, prednisone 20 mg daily, 
Xifaxan 550 mg p.o. b.i.d., Renagel 1600 p.o. t.i.d., vancomycin 125 mg p.o. 
q.i.d.



ASSESSMENT:

1.  Hepatic failure, improving.

2.  Leg edema, improved.

3.  End-stage renal disease, on hemodialysis.



PLAN:  We will continue hemodialysis.  We will continue Renagel.  He is 
currently on prednisone 20 mg daily.  We will continue to wean steroids.  He is 
on lisinopril and hydralazine.  Blood pressure is fairly controlled on current 
medications.  We will continue on bronchodilators.  He is awaiting subacute 
rehab placement.





__________________________________________

Saira Olivo MD







cc:   



DD: 05/28/2017 21:49:07  [id number]

TT: 05/28/2017 22:47:38

Confirmation # [id number]

Dictation # [id number]

mn

MTDD

## 2017-05-28 NOTE — PN
DATE: 05/27/2017



SUBJECTIVE:  The patient is comfortable, in no acute distress.  He was admitted 
with fulminant hepatitis and hepatic failure.  His condition is improving 
gradually.  He had leukocytosis with an elevated white count of 70,000.  White 
count has declined to normal now.  Flow cytometry and BCR-ABL on peripheral 
blood was normal.  Hemoglobin has been low, but it is stable.  He has not 
required blood transfusion in past few days.  Complaining of constipation and 
weakness in the upper extremities and lower extremities, not able to ambulate 
without support.  No fever.  No cough with expectoration.



REVIEW OF SYSTEMS:  As per HPI.  Rest of 12-point review of systems reviewed 
and negative.



PHYSICAL EXAMINATION:

GENERAL:  Comfortable in bed, in no acute distress.

VITAL SIGNS:  Temperature 98.9, heart rate is 70 per minute, respiratory rate 
is 18 per minute, blood pressure 130/88.

HEENT:  Normal.

CHEST:  Air entry present, equal bilateral.  No added sound.

CARDIOVASCULAR:  S1, S2 normal.  No murmur, no gallop.

ABDOMEN:  Soft, nontender, no hepatosplenomegaly.

EXTREMITIES:  No edema.

NEUROLOGIC:  Awake, alert, oriented x 3.  No focal sensorimotor deficit.

SKIN:  No petechia, no rash.



LABORATORY DATA:  White count 7.4, hemoglobin 8.8, hematocrit 27, platelet 
count 134.  Sodium 133, potassium 3.1, glucose 355, total bilirubin 1.7.  iron 
56, percentage saturation 32.



MEDICATIONS:  DuoNeb 3 mL q. 4 hours p.r.n., Norvasc 10 mg daily, aspirin 81 mg 
daily, heparin 5000 subQ q. 12, hydralazine 10 mg q. 6 hours p.r.n., Levemir 20 
units subQ daily, regular insulin plus sliding scale, lisinopril 10 mg daily, 
Zofran 4 mg IV q. 6 hours p.r.n., Protonix 40 mg daily, prednisone 20 mg daily, 
Xifaxan 550 mg p.o. b.i.d., Renagel 1600 p.o. t.i.d., vancomycin 125 mg p.o. 
q.i.d.



ASSESSMENT:

1.  Leukocytosis, resolved.

2.  Severe anemia, hemoglobin and hematocrit stable.  Hemoglobin and hematocrit 
stable.  Iron studies showed iron saturation of 32%.  Anemia likely related to 
chronic kidney disease.  He will need Procrit to maintain normal hemoglobin.  

3.  End-stage renal disease, on hemodialysis.

4.  Fulminant hepatitis, improving.

5.  Delirium, improving.



Thank you, Dr. Rae, for allowing us to participate in the patient's care.





__________________________________________

Saira Olivo MD







cc:   



DD: 05/28/2017 21:44:37  [id number]

TT: 05/28/2017 22:17:05

Confirmation # [id number]

Dictation # [id number]

lakshmi STEWART

## 2017-05-28 NOTE — CP.PCM.PN
<Parmjit Tirado - Last Filed: 05/28/17 15:41>





Subjective





- Date & Time of Evaluation


Date of Evaluation: 05/28/17


Time of Evaluation: 15:41





- Subjective


Subjective: 





GI for Dr. Morales 





Pt s&e w attending. JERO. Pt is comfortable and has no complainst. Tolerating 

PO. Denies F/C?N/V/D/CP/SOB. 





Objective





- Vital Signs/Intake and Output


Vital Signs (last 24 hours): 


 











Temp Pulse Resp BP Pulse Ox


 


 97.9 F   94 H  20   141/67   94 L


 


 05/28/17 07:18  05/28/17 07:18  05/28/17 07:18  05/28/17 09:34  05/28/17 07:18








Intake and Output: 


 











 05/28/17 05/28/17





 06:59 18:59


 


Intake Total 240 600


 


Output Total 3000 


 


Balance -2760 600














- Medications


Medications: 


 Current Medications





Albuterol/Ipratropium (Duoneb 3 Mg/0.5 Mg (3 Ml) Ud)  3 ml IH Q2H PRN


   PRN Reason: Shortness of Breath


   Last Admin: 05/23/17 21:25 Dose:  3 ml


Albuterol/Ipratropium (Duoneb 3 Mg/0.5 Mg (3 Ml) Ud)  3 ml IH [id number] [facility]


   Last Admin: 05/28/17 12:34 Dose:  3 ml


Amlodipine Besylate (Norvasc)  10 mg PO DAILY [facility]


   Last Admin: 05/28/17 09:34 Dose:  10 mg


Aspirin (Ecotrin)  81 mg PO DAILY [facility]


Heparin Sodium (Porcine) (Heparin)  5,000 units SC Q12 OSEAS


   PRN Reason: Protocol


   Last Admin: 05/28/17 09:41 Dose:  Not Given


Hydralazine HCl (Apresoline)  10 mg IVP Q6 PRN


   PRN Reason: SBP >180


   Last Admin: 05/17/17 02:05 Dose:  10 mg


Insulin Detemir (Levemir)  20 unit SC DAILY [facility]


   Last Admin: 05/28/17 09:33 Dose:  20 unit


Insulin Human Regular (Humulin R Med)  0 units SC Q4 [facility]


   Last Admin: 05/28/17 12:44 Dose:  8 units


Lisinopril (Zestril)  10 mg PO DAILY [facility]


   Last Admin: 05/28/17 09:34 Dose:  10 mg


Ondansetron HCl (Zofran Inj)  4 mg IVP Q6H PRN


   PRN Reason: Nausea/Vomiting


   Last Admin: 05/27/17 23:30 Dose:  4 mg


Pantoprazole Sodium (Protonix Ec Tab)  40 mg PO 0630 [facility]


   Last Admin: 05/28/17 05:47 Dose:  40 mg


Prednisone (Prednisone Tab)  20 mg PO DAILY [facility]


   Last Admin: 05/28/17 09:33 Dose:  20 mg


Rifaximin (Xifaxan)  550 mg PO BID [facility]


   PRN Reason: Protocol


   Last Admin: 05/28/17 09:33 Dose:  550 mg


Sevelamer HCl (Renagel)  1,600 mg PO TID [facility]


   Last Admin: 05/28/17 15:35 Dose:  Not Given


Vancomycin HCl (Vancocin 25 Mg/Ml (Oral Use))  125 mg PO QID [facility]


   PRN Reason: Protocol


   Last Admin: 05/28/17 15:35 Dose:  Not Given











- Labs


Labs: 


 





 05/27/17 07:00 





 05/27/17 07:00 





 











PT  12.6 Seconds (9.9-11.8)  H  05/22/17  10:00    


 


INR  1.17  (0.93-1.08)  H  05/22/17  10:00    


 


APTT  26.8 Seconds (23.7-30.8)   05/18/17  07:00    














- Constitutional


Appears: No Acute Distress





- Head Exam


Head Exam: ATRAUMATIC, NORMAL INSPECTION, NORMOCEPHALIC





- Eye Exam


Eye Exam: EOMI, Normal appearance, PERRL


Pupil Exam: NORMAL ACCOMODATION, PERRL





- ENT Exam


ENT Exam: Mucous Membranes Moist, Normal Exam





- Neck Exam


Neck Exam: Full ROM, Normal Inspection.  absent: Lymphadenopathy





- Respiratory Exam


Respiratory Exam: Clear to Ausculation Bilateral, NORMAL BREATHING PATTERN





- Cardiovascular Exam


Cardiovascular Exam: REGULAR RHYTHM, +S1, +S2.  absent: Murmur





- GI/Abdominal Exam


GI & Abdominal Exam: Soft, Normal Bowel Sounds.  absent: Tenderness





- Extremities Exam


Extremities Exam: Full ROM, Normal Capillary Refill, Normal Inspection.  absent

: Joint Swelling, Pedal Edema





- Back Exam


Back Exam: NORMAL INSPECTION





- Neurological Exam


Neurological Exam: Alert, Awake, CN II-XII Intact, Normal Gait, Oriented x3





- Psychiatric Exam


Psychiatric exam: Normal Affect, Normal Mood





- Skin


Skin Exam: Dry, Intact, Normal Color, Warm





Assessment and Plan





- Assessment and Plan (Free Text)


Assessment: 





61 M w PMD ESRD on HD , DM , CAD, GERD came with vomiting and respiratory 

distress. found to have elevated LFT and ammonia (63->29). 


Hep panel was negative. On initial labs MELD score: 32 -> 50% 3month mortality. 


Differential dx: Acetominophen induced acute hepatic failure , hepatic 

encephalopathy. 


Liver failure with Unknown etiology: LFT, Lipase trending down 


Clinically imprving as well. 





Plan


-ABX per ID 


-Trend LFT 


-Medical management 


-We will continue to follow closely 





Case Discussed with Dr. Morales 





<Leydi Morales - Last Filed: 07/13/17 21:50>





Objective





- Vital Signs/Intake and Output


Vital Signs (last 24 hours): 


 











Temp Pulse Resp BP Pulse Ox


 


 97.9 F   72   18   130/60   99 


 


 06/02/17 07:30  06/02/17 07:30  06/02/17 07:30  06/02/17 07:30  06/02/17 07:30











- Labs


Labs: 


 





 06/02/17 11:42 





 06/02/17 11:42 





 











PT  11.7 Seconds (9.9-11.8)   05/31/17  12:30    


 


INR  1.08  (0.93-1.08)   05/31/17  12:30    


 


APTT  23.1 Seconds (23.7-30.8)  L  05/31/17  12:30    














Assessment and Plan





- Assessment and Plan (Free Text)


Assessment: 


Addendum to the GI progress note of Dr. Tirado. Patient seen,examined, and chart 

reviewed. Continue to follow closely, continue present management.

## 2017-05-29 LAB
ALBUMIN/GLOB SERPL: 0.8 {RATIO}
ALP SERPL-CCNC: 102 U/L
ALT SERPL-CCNC: 74 U/L
AST SERPL-CCNC: 53 U/L
BILIRUB SERPL-MCNC: 1.5 MG/DL
BUN SERPL-MCNC: 78 MG/DL
CALCIUM SERPL-MCNC: 9.6 MG/DL
CHLORIDE SERPL-SCNC: 95 MMOL/L
CO2 SERPL-SCNC: 22 MMOL/L
ERYTHROCYTE [DISTWIDTH] IN BLOOD BY AUTOMATED COUNT: 18.2 %
GLOBULIN SER-MCNC: 4.1 GM/DL
GLUCOSE SERPL-MCNC: 329 MG/DL
HCT VFR BLD CALC: 26 %
MAGNESIUM SERPL-MCNC: 2 MG/DL
MCH RBC QN AUTO: 29.9 PG
MCHC RBC AUTO-ENTMCNC: 33.5 G/DL
MCV RBC AUTO: 89.3 FL
PHOSPHATE SERPL-MCNC: 4.5 MG/DL
PLATELET # BLD: 210 10^3/UL
PMV BLD AUTO: 9 FL
POTASSIUM SERPL-SCNC: 3.3 MMOL/L
PROT SERPL-MCNC: 7.4 G/DL
SODIUM SERPL-SCNC: 130 MMOL/L
WBC # BLD AUTO: 9.3 10^3/UL

## 2017-05-29 NOTE — PN
DATE: 05/29/2017



SUBJECTIVE:  This patient was seen and evaluated earlier today.  The patient is comfortable, not in d
istress.



PHYSICAL EXAMINATION: 

VITAL SIGNS:  Temperature is 98, blood pressure is 168/70, pulse 60.

HEENT:  Atraumatic, anicteric.

NECK:  Supple.

HEART:  S1, S2 heard.

LUNGS:  Bilateral air entry present.  There are decreased breath sounds in the base.   

EXTREMITIES:   No cyanosis, no clubbing.  There is a fistula in the left upper extremity.



LABORATORY DATA:  Hemoglobin 8.7, hematocrit 26, WBC is 9.3, platelets 210.  Creatinine is 9.3, potas
sium 3.3, sodium 130.



IMPRESSION:  

1.  Acute liver failure, improving, clinically well.

2.  Methicillin-resistant Staphylococcus aureus pneumonia.  The patient has a history of mildly eleva
lillian _____.  The patient has gallstones and also mildly elevated pancreatitis.  It was thought to be a
t that time may be related to drug induced rather than a gallstone pancreatitis.  Bilirubin has been 
normal.  The reasonable thing is to follow up with the LFTs.  Want the patient to continue to be opti
mized initially further.



Thank you very much for allowing us to participate in the care of the patient.





__________________________________________

Leydi Morales MD







cc:



DD: 05/29/2017 23:05:29  [id number]

TT: 05/29/2017 23:43:50

Confirmation # [id number]

Dictation # [id number]

humberto

## 2017-05-29 NOTE — CARD
--------------- APPROVED REPORT --------------





EKG Measurement

Heart [id number]

[id number]

[id number]

[id number]

[id number]



<Conclusion>

Normal sinus rhythm

Nonspecific T wave abnormality

Abnormal ECG

## 2017-05-29 NOTE — CP.PCM.PN
Subjective





- Date & Time of Evaluation


Date of Evaluation: 05/29/17


Time of Evaluation: 04:18





- Subjective


Subjective: 


Patient was seen at bed side.


 He had complained of chest pain.


 States that chest pain is in left side of chest, has been present for past 3-4 

hours, he gets this pain every time after he gets the breathing   


 treatment, not radiating, was 10/10 , can not describe nature of pain, denies 

nausea, sweating, palpitation , cough, gives family history of   


 heart disease (MGM), is a smoker and uses alcohol.


 This 61 year old  male was admitted with nausea,vomiting,

abdominal pain, weakness, fatigue.





  Has PMH of HTN,CHF,gout, ESRD,obesity, anemia, IDDM,received multiple blood 

transfusions.





  /86, 98*F  HR-96/ min, RR- 18/ min.





Objective





- Vital Signs/Intake and Output


Vital Signs (last 24 hours): 


 











Temp Pulse Resp BP Pulse Ox


 


 97 F L  87   20   165/76 H  99 


 


 05/28/17 16:00  05/28/17 16:00  05/28/17 16:00  05/28/17 16:00  05/28/17 16:00








Intake and Output: 


 











 05/28/17 05/29/17





 18:59 06:59


 


Intake Total 840 120


 


Balance 840 120














- Medications


Medications: 


 Current Medications





Albuterol/Ipratropium (Duoneb 3 Mg/0.5 Mg (3 Ml) Ud)  3 ml IH Q2H PRN


   PRN Reason: Shortness of Breath


   Last Admin: 05/23/17 21:25 Dose:  3 ml


Albuterol/Ipratropium (Duoneb 3 Mg/0.5 Mg (3 Ml) Ud)  3 ml IH [id number] [facility]


   Last Admin: 05/29/17 01:44 Dose:  3 ml


Amlodipine Besylate (Norvasc)  10 mg PO DAILY [facility]


   Last Admin: 05/28/17 09:34 Dose:  10 mg


Aspirin (Ecotrin)  81 mg PO DAILY [facility]


Heparin Sodium (Porcine) (Heparin)  5,000 units SC Q12 OSEAS


   PRN Reason: Protocol


   Last Admin: 05/28/17 21:53 Dose:  5,000 units


Hydralazine HCl (Apresoline)  10 mg IVP Q6 PRN


   PRN Reason: SBP >180


   Last Admin: 05/17/17 02:05 Dose:  10 mg


Insulin Detemir (Levemir)  20 unit SC DAILY [facility]


   Last Admin: 05/28/17 09:33 Dose:  20 unit


Insulin Human Regular (Humulin R Med)  0 units SC Q4 [facility]


   Last Admin: 05/28/17 17:23 Dose:  7 units


Lisinopril (Zestril)  10 mg PO DAILY [facility]


   Last Admin: 05/28/17 09:34 Dose:  10 mg


Ondansetron HCl (Zofran Inj)  4 mg IVP Q6H PRN


   PRN Reason: Nausea/Vomiting


   Last Admin: 05/27/17 23:30 Dose:  4 mg


Pantoprazole Sodium (Protonix Ec Tab)  40 mg PO 0630 [facility]


   Last Admin: 05/28/17 05:47 Dose:  40 mg


Prednisone (Prednisone Tab)  20 mg PO DAILY [facility]


   Last Admin: 05/28/17 09:33 Dose:  20 mg


Rifaximin (Xifaxan)  550 mg PO BID [facility]


   PRN Reason: Protocol


   Last Admin: 05/28/17 17:24 Dose:  550 mg


Sevelamer HCl (Renagel)  1,600 mg PO TID [facility]


   Last Admin: 05/28/17 17:24 Dose:  1,600 mg


Vancomycin HCl (Vancocin 25 Mg/Ml (Oral Use))  125 mg PO QID [facility]


   PRN Reason: Protocol


   Last Admin: 05/28/17 21:55 Dose:  125 mg











- Labs


Labs: 


 





 05/27/17 07:00 





 05/27/17 07:00 





 











PT  12.6 Seconds (9.9-11.8)  H  05/22/17  10:00    


 


INR  1.17  (0.93-1.08)  H  05/22/17  10:00    


 


APTT  26.8 Seconds (23.7-30.8)   05/18/17  07:00    














- Constitutional


Appears: Well, No Acute Distress





- Head Exam


Head Exam: ATRAUMATIC, NORMAL INSPECTION, NORMOCEPHALIC





- Eye Exam


Eye Exam: Normal appearance





- ENT Exam


ENT Exam: Normal External Ear Exam





- Neck Exam


Neck Exam: Normal Inspection





- Respiratory Exam


Respiratory Exam: Chest Wall Tenderness (Left precordial tenderness positive.), 

Clear to Ausculation Bilateral, NORMAL BREATHING PATTERN.  absent: Rales, 

Rhonchi, Wheezes, Respiratory Distress, Stridor





- Cardiovascular Exam


Cardiovascular Exam: REGULAR RHYTHM, +S1 (Normal.), +S2 (Normal.).  absent: JVD

, Murmur





- GI/Abdominal Exam


GI & Abdominal Exam: absent: Distended, Tenderness





- Rectal Exam


Rectal Exam: Deferred





- Extremities Exam


Extremities Exam: absent: Calf Tenderness, Tenderness





- Back Exam


Back Exam: NORMAL INSPECTION





- Neurological Exam


Neurological Exam: Alert, Oriented x3





- Psychiatric Exam


Psychiatric exam: Normal Affect, Normal Mood





- Skin


Skin Exam: Normal Color





Assessment and Plan





- Assessment and Plan (Free Text)


Assessment: 





Chest pain, musculo skeletal.


R/o cardiac cause.


CHF.


HTN.


Obesity.


ESRD.


Anemia.


Gout.





Plan: 





EKG stat.


Troponin stat.


Naprosyn 550 mg PO stat.


Mylanta 30 CC po Stat.


Continue present management.

## 2017-05-29 NOTE — PN
DATE: 05/29/2017



SUBJECTIVE:  The patient is currently seen in hemodialysis.  Blood pressure is stable.  We are removi
ng 3000 mL of fluid.  He is tolerating dialysis well.  He has no acute complaints.



MEDICATIONS:  List reviewed.  The patient is on p.r.n. hydralazine, albuterol, aspirin, heparin, insu
elva, Norvasc, prednisone, Protonix, Renagel, oral vancomycin, Xifaxan, Zestril and Zofran p.r.n.



OBJECTIVE:

INTAKE AND OUTPUT:  Intake is 1080, output is not charted, but is likely 0.

VITAL SIGNS:  Blood pressure presently is 141/88, heart rate is 78, respiratory rate is 18, temperatu
re is 97.9.

HEENT:  Shows him to be normocephalic, atraumatic.  Conjunctivae are pale.  Sclerae are nonicteric.

NECK:  Supple, no neck vein distention.

CHEST:  Clear to auscultation and percussion with decreased breath sounds at the bases.

CARDIOVASCULAR:  Shows a normal S1, S2.  MR/TR.  No rub.  No S3, no S4.

ABDOMEN:  Soft.  Mild distention, mild obesity.  Bowel sounds normal.  No rebound, no guarding, no ma
sses, no mid epigastric or right upper quadrant tenderness on palpation.

EXTREMITIES:  Show no lower extremity edema.  He has a cannulated AV fistula of the left upper extrem
ity with blood flow rates of 400 mL per minute.



LABORATORY DATA AND IMAGING:  Predialysis labs from today are pending.  His last white blood cell cou
nt was 7.4 with a hemoglobin of 8.8 and a platelet count of 134,000.  Chemistries:  Last potassium le
travis was 3.1.  BUN 44 with a creatinine of 4.9.  Last sugar was 355.  Calcium 9.2, phosphorus improved
 at 3.9, magnesium 2.0.



ASSESSMENT:

1.  Status post-treatment for a methicillin-resistant Staphylococcus aureus pneumonia.  The patient h
as completed a course of Zyvox.

2.  Status post diarrhea.  The patient is completing a course of oral vancomycin therapy.  Lab tests 
for Clostridium difficile have been negative.

3.  Acute pancreatitis, transaminitis and possible gallstone-induced acute cholecystitis.  The patien
t's liver enzymes continue to fall to normal.  Liver enzymes from today are pending.  The patient's l
ast lipase was 814, down from greater than 20,000.  The patient had been evaluated by surgery and at 
some point in time will likely need to have an elective cholecystectomy.

4.  History of anemia.  Hemoglobin remains stable.  The patient will continue to receive Aranesp maxi
mum dose.  Iron levels are acceptable.

5.  History of end-stage renal disease.  Continue Monday, Wednesday, Friday dialysis.

6.  History of severe secondary hyperparathyroidism.  Repeat phosphorus level is now normal.  The pat
ient will continue binder therapy.

7.  The patient is severely deconditioned with a 14-day hospital stay.  He needs aggressive physical 
therapy.



PLAN:

1.  Continue Monday, Wednesday, Friday dialysis.

2.  P.r.n. increased potassium dialysate if K level is low.

3.  Continue insulin therapy for diabetes.

4.  Continue renal diet and binder therapy.

5.  The patient needs intensive physical therapy to get back on his feet and to start ambulating agai
n.





__________________________________________

Martin Hardy MD







cc:



DD: 05/29/2017 10:51:22  [id number]

TT: 05/29/2017 12:40:58

Confirmation # [id number]

Dictation # [id number]

en

## 2017-05-29 NOTE — PN
DATE: 05/29/2017



The patient is in bed, in no acute distress, nontoxic.



PHYSICAL EXAMINATION:

VITAL SIGNS:  Temperature is 97, blood pressure is 170/60, respiratory rate of 20, heart rate of 94. 
 

HEENT:  Unremarkable.

NECK:  Supple.

LUNGS:  Have decreased breath sounds.

HEART:  Normal S1, S2.

ABDOMEN:  Soft.



LABORATORY DATA:  Reveals the patient's white count is 9.3, hemoglobin of 8.7, platelets of 210.  Ann
mistries are noted with a creatinine of 9.3.  Review of the orders reveals the patient to be on p.o. 
vancomycin.  The patient's stool for C. diff from 05/19/2017:  Negative toxin, negative antigen.  Dr. Sandoval's note from earlier today is noted.



ASSESSMENT AND PLAN:  A 61-year-old male with severe sepsis, status post ventilatory-dependent respir
atory failure, acute liver failure, fulminant hepatitis, bilateral healthcare-associated pneumonia, m
ethicillin-resistant Staphylococcus aureus, hypertension; end-stage renal disease, on hemodialysis; m
orbid obesity; history of multiple positive human immunodeficiency virus, fourth generation tests neg
ative, may have been a false positive JEANNETTE.  On p.o. vancomycin.  Complete a short course of p.o. va
ncomycin of 7-10 days.  Today is day #6 of 7-10 days of p.o. vancomycin.





__________________________________________

Josh Ellis MD







cc:



DD: 05/29/2017 11:53:28  [id number]

TT: 05/29/2017 13:23:38

Confirmation # [id number]

Dictation # [id number]

mn

## 2017-05-29 NOTE — CP.PCM.PN
Subjective





- Date & Time of Evaluation


Date of Evaluation: 05/29/17


Time of Evaluation: 12:00





- Subjective


Subjective: 





DATE: 05/29/2017





SUBJECTIVE:  The patient is comfortable, in no acute distress.  He was admitted 

with fulminant hepatitis and hepatic failure.  His condition is improving 

gradually.  He had leukocytosis with an elevated white count of 70,000.  White 

count has declined to normal now.  Flow cytometry and BCR-ABL on peripheral 

blood was normal.  Hemoglobin has been low, but it is stable.  He has not 

required blood transfusion in past few days.  Complaining of constipation and 

weakness in the upper extremities and lower extremities, not able to ambulate 

without support.  No fever.  No cough with expectoration.


Dialysis today





REVIEW OF SYSTEMS:  As per HPI.  Rest of 12-point review of systems reviewed 

and negative.





PHYSICAL EXAMINATION:


GENERAL:  Comfortable in bed, in no acute distress.


VITAL SIGNS:  reviewed.


HEENT:  Normal.


CHEST:  Air entry present, equal bilateral.  No added sound.


CARDIOVASCULAR:  S1, S2 normal.  No murmur, no gallop.


ABDOMEN:  Soft, nontender, no hepatosplenomegaly.


EXTREMITIES:  No edema.


NEUROLOGIC:  Awake, alert, oriented x 3.  No focal sensorimotor deficit.


SKIN:  No petechia, no rash.





LABORATORY DATA:  White count 7.4, hemoglobin 8.8, hematocrit 27, platelet 

count 134.  Sodium 133, potassium 3.1, glucose 355, total bilirubin 1.7.  iron 

56, percentage saturation 32.





MEDICATIONS:  DuoNeb 3 mL q. 4 hours p.r.n., Norvasc 10 mg daily, aspirin 81 mg 

daily, heparin 5000 subQ q. 12, hydralazine 10 mg q. 6 hours p.r.n., Levemir 20 

units subQ daily, regular insulin plus sliding scale, lisinopril 10 mg daily, 

Zofran 4 mg IV q. 6 hours p.r.n., Protonix 40 mg daily, prednisone 20 mg daily, 

Xifaxan 550 mg p.o. b.i.d., Renagel 1600 p.o. t.i.d., vancomycin 125 mg p.o. 

q.i.d.





ASSESSMENT:


1.  Leukocytosis, resolved.


2.  Severe anemia, hemoglobin and hematocrit stable.  Hemoglobin and hematocrit 

stable.  Iron studies showed iron saturation of 32%.  Anemia likely related to 

chronic kidney disease.  He will need Procrit to maintain normal hemoglobin.  


3.  End-stage renal disease, on hemodialysis.


4.  Fulminant hepatitis, improving.


5.  Delirium, resolved





discharge planning. 





Thank you, Dr. Rae, for allowing us to participate in the patient's care.








__________________________________________


Saira Olivo MD








Objective





- Vital Signs/Intake and Output


Vital Signs (last 24 hours): 


 











Temp Pulse Resp BP Pulse Ox


 


 98 F   60   18   168/70 H  93 L


 


 05/29/17 15:49  05/29/17 15:49  05/29/17 15:49  05/29/17 15:49  05/29/17 15:49








Intake and Output: 


 











 05/29/17 05/30/17





 18:59 06:59


 


Intake Total 780 


 


Balance 780 














- Medications


Medications: 


 Current Medications





Albuterol/Ipratropium (Duoneb 3 Mg/0.5 Mg (3 Ml) Ud)  3 ml IH Q2H PRN


   PRN Reason: Shortness of Breath


   Last Admin: 05/23/17 21:25 Dose:  3 ml


Albuterol/Ipratropium (Duoneb 3 Mg/0.5 Mg (3 Ml) Ud)  3 ml IH [id number] [facility]


   Last Admin: 05/29/17 16:23 Dose:  Not Given


Amlodipine Besylate (Norvasc)  10 mg PO DAILY [facility]


   Last Admin: 05/29/17 11:04 Dose:  Not Given


Aspirin (Ecotrin)  81 mg PO DAILY [facility]


   Last Admin: 05/29/17 11:03 Dose:  Not Given


Heparin Sodium (Porcine) (Heparin)  5,000 units SC Q12 [facility]


   PRN Reason: Protocol


   Last Admin: 05/29/17 21:02 Dose:  5,000 units


Hydralazine HCl (Apresoline)  10 mg IVP Q6 PRN


   PRN Reason: SBP >180


   Last Admin: 05/17/17 02:05 Dose:  10 mg


Insulin Detemir (Levemir)  20 unit SC DAILY [facility]


   Last Admin: 05/29/17 11:04 Dose:  Not Given


Insulin Human Regular (Humulin R Med)  0 units SC Q4 [facility]


   Last Admin: 05/29/17 21:02 Dose:  3 units


Lisinopril (Zestril)  10 mg PO DAILY [facility]


   Last Admin: 05/29/17 11:05 Dose:  Not Given


Ondansetron HCl (Zofran Inj)  4 mg IVP Q6H PRN


   PRN Reason: Nausea/Vomiting


   Last Admin: 05/27/17 23:30 Dose:  4 mg


Pantoprazole Sodium (Protonix Ec Tab)  40 mg PO 0630 [facility]


   Last Admin: 05/29/17 07:54 Dose:  40 mg


Prednisone (Prednisone Tab)  20 mg PO DAILY [facility]


   Last Admin: 05/29/17 11:04 Dose:  Not Given


Rifaximin (Xifaxan)  550 mg PO BID [facility]


   PRN Reason: Protocol


   Last Admin: 05/29/17 17:20 Dose:  550 mg


Sevelamer HCl (Renagel)  1,600 mg PO TID [facility]


   Last Admin: 05/29/17 17:20 Dose:  1,600 mg


Vancomycin HCl (Vancocin 25 Mg/Ml (Oral Use))  125 mg PO QID [facility]


   PRN Reason: Protocol


   Last Admin: 05/29/17 21:02 Dose:  125 mg











- Labs


Labs: 


 





 05/29/17 10:20 





 05/29/17 10:20 





 











PT  12.6 Seconds (9.9-11.8)  H  05/22/17  10:00    


 


INR  1.17  (0.93-1.08)  H  05/22/17  10:00    


 


APTT  26.8 Seconds (23.7-30.8)   05/18/17  07:00

## 2017-05-30 NOTE — PN
DATE: 05/30/2017



Seen and examined at the bedside late this morning.  He is out of bed in a chair.  Denies any nausea,
 vomiting, or abdominal pain.  They were having issues with his blood sugar this morning.  The patien
t states that his appetite is good.  In fact, he is eating too much.  He denies diarrhea; having form
ed soft stool.  No reports of any overt GI bleed.



VITAL SIGNS:  Temperature 98.3, blood pressure 134/72, pulse 85, respirations 18, 96 room air.



LABORATORY DATA:  The patient is not noted to have any new labs for today except for POC glucose at 4
:23 a.m. at 331.



PHYSICAL EXAMINATION:

HEENT:  Sclerae are anicteric.

NECK:  Supple.

CARDIAC:  S1, S2.

LUNGS:  With decreased breath sounds but good air entry.  No rales or wheeze.

ABDOMEN:  With bowel sounds.  Soft, obese.  No tenderness on palpation.

EXTREMITIES:  No edema.  The patient with left upper arm fistula.



ASSESSMENT AND PLAN:  Improving acute liver failure.  The patient is doing clinically well.  The ashley
ent with history of end-stage renal disease, on dialysis.  He also has methicillin-resistant Staphylo
coccus aureus.  The patient noted to have elevated liver enzymes which are improving.  Found to have 
gallstones and also pancreatitis; thought it could be drug induced rather than gallstone pancreatitis
.  His liver enzymes have improved.  Continue to monitor the patient with a history of diabetes mariela priest and he is on insulin coverage and on Levemir.  Continue PPI.  The patient is on Xifaxan and on pr
ednisone, on oral vancomycin for severe sepsis.  Will continue to monitor.  The patient was seen and 
case discussed with Dr. Morales.





__________________________________________

Naya HINES







cc:



DD: 05/30/2017 11:49:32  [id number]

TT: 05/30/2017 12:16:50

Confirmation # [id number]

Dictation # [id number]

mn

## 2017-05-30 NOTE — CP.PCM.PN
Subjective





- Date & Time of Evaluation


Date of Evaluation: 05/30/17


Time of Evaluation: 11:30





- Subjective


Subjective: 





Comfortable, afebrile, improving bowel movements, breathing better.





Objective





- Vital Signs/Intake and Output


Vital Signs (last 24 hours): 


 











Temp Pulse Resp BP Pulse Ox


 


 98.3 F   85   18   134/72   96 


 


 05/30/17 07:24  05/30/17 09:28  05/30/17 07:24  05/30/17 09:29  05/30/17 07:24











- Medications


Medications: 


 Current Medications





Albuterol/Ipratropium (Duoneb 3 Mg/0.5 Mg (3 Ml) Ud)  3 ml IH Q2H PRN


   PRN Reason: Shortness of Breath


   Last Admin: 05/23/17 21:25 Dose:  3 ml


Amlodipine Besylate (Norvasc)  10 mg PO DAILY [facility]


   Last Admin: 05/30/17 09:29 Dose:  10 mg


Aspirin (Ecotrin)  81 mg PO DAILY [facility]


   Last Admin: 05/30/17 09:29 Dose:  81 mg


Heparin Sodium (Porcine) (Heparin)  5,000 units SC Q12 [facility]


   PRN Reason: Protocol


   Last Admin: 05/30/17 09:29 Dose:  5,000 units


Hydralazine HCl (Apresoline)  10 mg IVP Q6 PRN


   PRN Reason: SBP >180


   Last Admin: 05/17/17 02:05 Dose:  10 mg


Insulin Detemir (Levemir)  20 unit SC DAILY [facility]


   Last Admin: 05/30/17 09:27 Dose:  20 unit


Insulin Human Regular (Humulin R Med)  0 units SC Q4 [facility]


   Last Admin: 05/30/17 12:14 Dose:  7 units


Lisinopril (Zestril)  10 mg PO DAILY [facility]


   Last Admin: 05/30/17 09:28 Dose:  10 mg


Ondansetron HCl (Zofran Inj)  4 mg IVP Q6H PRN


   PRN Reason: Nausea/Vomiting


   Last Admin: 05/27/17 23:30 Dose:  4 mg


Pantoprazole Sodium (Protonix Ec Tab)  40 mg PO 0630 [facility]


   Last Admin: 05/30/17 05:39 Dose:  40 mg


Prednisone (Prednisone Tab)  10 mg PO DAILY [facility]


Rifaximin (Xifaxan)  550 mg PO BID [facility]


   PRN Reason: Protocol


   Last Admin: 05/30/17 09:29 Dose:  550 mg


Sevelamer HCl (Renagel)  1,600 mg PO TID [facility]


   Last Admin: 05/30/17 13:05 Dose:  1,600 mg


Vancomycin HCl (Vancocin 25 Mg/Ml (Oral Use))  125 mg PO QID [facility]


   PRN Reason: Protocol


   Last Admin: 05/30/17 13:05 Dose:  125 mg











- Labs


Labs: 


 





 05/29/17 10:20 





 05/29/17 10:20 





 











PT  12.6 Seconds (9.9-11.8)  H  05/22/17  10:00    


 


INR  1.17  (0.93-1.08)  H  05/22/17  10:00    


 


APTT  26.8 Seconds (23.7-30.8)   05/18/17  07:00    














- Constitutional


Appears: Non-toxic, No Acute Distress





- Head Exam


Head Exam: NORMAL INSPECTION





- ENT Exam


ENT Exam: Mucous Membranes Moist





- Neck Exam


Neck Exam: absent: Lymphadenopathy, Meningismus





- Respiratory Exam


Respiratory Exam: Decreased Breath Sounds





- Cardiovascular Exam


Cardiovascular Exam: +S1, +S2





- GI/Abdominal Exam


GI & Abdominal Exam: Soft.  absent: Tenderness





Assessment and Plan





- Assessment and Plan (Free Text)


Plan: 





Assessment


S/PSevere sepsis with ventilator-dependent respiratory failure and acute liver 

failure/acute fulminant hepatitis due to bilateral healthcare-associated 

pneumonia with sputum cx now growing MRSA, S/P treatment


rule out C. diff. infection, clinically improved


HTN


ESRD on HD


dyslipidemia


morbid obesity with BMI 45 


positive HIV JEANNETTE test with negative HIV-1 PCR





Plan


continue PO vancomycin (day 10 of 10-14 days)


patient has history of multiple positive HIV JEANNETTE, even the 4th generation 

test - HIV-1 PCR still negative - may be a false positive test


will continue to monitor clinically

## 2017-05-30 NOTE — CP.PCM.PN
Subjective





- Date & Time of Evaluation


Date of Evaluation: 05/30/17


Time of Evaluation: 22:16





- Subjective


Subjective: 





S:Patient was seen for fsbs of 412 mg %.


  Has no complaints.


  States that he ate 2 extra cookies.


  Denies blurry vision, polyurea.


  Medical record was reviewed.





O:


Last Vital Signs





 3





Temp  98.2 F   05/30/17 16:00


 


Pulse  85   05/30/17 16:00


 


Resp  20   05/30/17 16:00


 


BP  155/72 H  05/30/17 16:00


 


Pulse Ox  98   05/30/17 16:00








   Awake, alert, not in distress.


   LUNGS:Normal breathing pattern.


   ABD:No distension.





A: Hyperglycemia.





P:Regular insulin 6 untis SC stat.





Objective





- Vital Signs/Intake and Output


Vital Signs (last 24 hours): 


 











Temp Pulse Resp BP Pulse Ox


 


 98.2 F   85   20   155/72 H  98 


 


 05/30/17 16:00  05/30/17 16:00  05/30/17 16:00  05/30/17 16:00  05/30/17 16:00








Intake and Output: 


 











 05/30/17 05/31/17





 18:59 06:59


 


Intake Total 480 


 


Balance 480 














- Medications


Medications: 


 Current Medications





Albuterol/Ipratropium (Duoneb 3 Mg/0.5 Mg (3 Ml) Ud)  3 ml IH Q2H PRN


   PRN Reason: Shortness of Breath


   Last Admin: 05/30/17 16:41 Dose:  3 ml


Amlodipine Besylate (Norvasc)  10 mg PO DAILY [facility]


   Last Admin: 05/30/17 09:29 Dose:  10 mg


Aspirin (Ecotrin)  81 mg PO DAILY [facility]


   Last Admin: 05/30/17 09:29 Dose:  81 mg


Heparin Sodium (Porcine) (Heparin)  5,000 units SC Q12 [facility]


   PRN Reason: Protocol


   Last Admin: 05/30/17 09:29 Dose:  5,000 units


Hydralazine HCl (Apresoline)  10 mg IVP Q6 PRN


   PRN Reason: SBP >180


   Last Admin: 05/17/17 02:05 Dose:  10 mg


Insulin Detemir (Levemir)  20 unit SC DAILY [facility]


   Last Admin: 05/30/17 09:27 Dose:  20 unit


Insulin Human Regular (Humulin R Med)  0 units SC Q4 [facility]


   Last Admin: 05/30/17 17:16 Dose:  8 units


Lisinopril (Zestril)  10 mg PO DAILY [facility]


   Last Admin: 05/30/17 09:28 Dose:  10 mg


Ondansetron HCl (Zofran Inj)  4 mg IVP Q6H PRN


   PRN Reason: Nausea/Vomiting


   Last Admin: 05/27/17 23:30 Dose:  4 mg


Pantoprazole Sodium (Protonix Ec Tab)  40 mg PO 0630 [facility]


   Last Admin: 05/30/17 05:39 Dose:  40 mg


Prednisone (Prednisone Tab)  10 mg PO DAILY [facility]


Rifaximin (Xifaxan)  550 mg PO BID [facility]


   PRN Reason: Protocol


   Last Admin: 05/30/17 17:17 Dose:  550 mg


Sevelamer HCl (Renagel)  1,600 mg PO TID [facility]


   Last Admin: 05/30/17 17:16 Dose:  1,600 mg


Vancomycin HCl (Vancocin 25 Mg/Ml (Oral Use))  125 mg PO QID [facility]


   PRN Reason: Protocol


   Last Admin: 05/30/17 17:17 Dose:  125 mg











- Labs


Labs: 


 





 05/29/17 10:20 





 05/29/17 10:20 





 











PT  12.6 Seconds (9.9-11.8)  H  05/22/17  10:00    


 


INR  1.17  (0.93-1.08)  H  05/22/17  10:00    


 


APTT  26.8 Seconds (23.7-30.8)   05/18/17  07:00

## 2017-05-30 NOTE — PN
DATE: 05/30/2017



SUBJECTIVE:  The patient is currently seen in his room.  He is sitting on a commode.  He had an uneve
ntful dialysis yesterday.  He remains in isolation for his MRSA pneumonia.  He has completed a course
 of Zyvox.



MEDICATIONS:  Medication list reviewed.  The patient is on hydralazine p.r.n., albuterol, aspirin, he
inna, insulin, Norvasc, prednisone, Protonix, Renagel, vancomycin orally, Xifaxan, Zestril and Zofra
n.



OBJECTIVE:

INTAKE AND OUTPUT:  Intake 780, output 3000 mL with dialysis.

VITAL SIGNS:  Blood pressure 134/72, temperature 98.3, respiratory rate 18 with a pulse of 85.

HEENT:  Normocephalic, atraumatic.  Conjunctivae are pale.  Sclerae are nonicteric.

NECK:  Supple, no neck vein distention.

CHEST:  Clear to auscultation and percussion with decreased breath sounds at the bases.

CARDIOVASCULAR:  S1, S2 normal.  MR/TR.  No rub.  No S3, no S4.

ABDOMEN:  Soft.  Mild distention, mild obesity.  Bowel sounds normal.  No rebound, no guarding, no mi
d epigastric or right upper quadrant tenderness on palpation.

EXTREMITIES:  No lower extremity edema.  Positive AV fistula, left upper extremity, positive thrill, 
positive bruit.



LABORATORY DATA AND IMAGING:  Labs done predialysis yesterday:  White blood cell count 9.3, hemoglobi
n 8.7 with a platelet count of 210,000.  Chemistries from yesterday:  Sodium 130, potassium 3.3, chlo
ride 95 with a BUN of 78 and a creatinine of 9.3.  Glucose is 329.  Calcium 9.6, phosphorus 4.5, magn
esium 2.0.  Liver enzymes, AST now normal.  Bilirubin down to 1.5, ALT down to 74.  Lipase remains mi
ldly elevated at 1337.



ASSESSMENT:  

1.  Status post treatment for a methicillin-resistant Staphylococcus aureus pneumonia.  The patient h
as completed a course of Zyvox.

2.  Status post diarrhea.  The patient remains on oral vancomycin therapy.  Lab tests for Clostridium
  difficile have been negative.

3.  Acute pancreatitis, transaminitis, possible gallstone-induced acute cholecystitis.  The patient's
 liver enzymes are trending down to normal.  Mild elevation of lipase.  The patient was evaluated by 
surgery and at some point in time, he will likely need to have an elective cholecystectomy.  He was t
hought to possibly have gallstone pancreatitis.

4.  History of anemia.  Hemoglobin remains stable.  The patient will continue maximum dose of Aranesp
.  Iron levels have been acceptable.  Transfusions on a p.r.n. basis.

5.  Non-insulin dependent diabetes mellitus.  The patient remains on insulin therapy.  Glucose contro
l remains erratic.

6.  History of end-stage renal disease.  Continue Monday, Wednesday, Friday dialysis.  Next dialysis 
scheduled for tomorrow.

7.  History of secondary hyperparathyroidism.  Phosphorus level is now normal.  The patient will cont
inue on binder therapy and a renal diet.

8.  The patient is severely deconditioned with a 15-day hospital stay.  He needs aggressive physical 
therapy, ambulation and balance.



PLAN:

1.  Continue Monday, Wednesday, Friday dialysis.

2.  Increase potassium bath dialysate if his potassium level remains low.

3.  Continue insulin for diabetes and perhaps increase long acting insulin.

4.  Continue renal diet and binder therapy.

5.  Question duration of oral vancomycin as C. diff studies have been negative.





__________________________________________

Martin Hardy MD







cc:



DD: 05/30/2017 10:15:03  [id number]

TT: 05/30/2017 10:55:12

Confirmation # [id number]

Dictation # [id number]

tn

## 2017-05-30 NOTE — PN
DATE: 05/30/2017



The patient is a 61-year-old who was admitted with intractable nausea and vomiting.  Later on that da
y, he became lethargic.  He was intubated for airway protection and he was found to be in ____ acute 
hepatic failure with unknown etiology; however, later on patient ____ he was extubated at 1 week prio
r to coming to the hospital.  He went ____ and he was having some abdominal discomfort for a few days
. However, the patient was admitted in ICU.  The initial plan was to transfer him to [facility], but while awaiting for transfer, the patient started to improve.  His LFTs started to improve.  H
e was kept n.p.o., he was given IV antibiotics since he had MRSA sputum and has bilateral pneumonia. 
 So patient eventually improved, his LFTs improved.  He was receiving hemodialysis.  He was successfu
lly extubated and transferred to med/surg floor.



PHYSICAL EXAMINATION:

GENERAL:  Today, he is awake and alert, communicative.

VITAL SIGNS:  He is afebrile, pulse 82, respirations 18, blood pressure 134/72.

LUNGS:  Bilateral fair airflow, no rhonchi or crackle.

HEART:  S1, S2 audible.

ABDOMEN:  Soft, obese, nontender, no rebound, no guarding.

NEUROLOGIC:  The patient is awake and alert, able to communicate.



LABORATORY EXAM:  WBC of 9.3, hemoglobin 8.7, hematocrit 26, platelets of 210.  Chemistry:  Sodium 13
0, potassium 3.6, chloride 95, CO2 22, BUN 78, creatinine 9.3, blood sugar of 331.



ASSESSMENT:

1.  Status post gallstone pancreatitis.

2.  Status post hepatic failure.

3.  End-stage renal disease, on hemodialysis.

4.  Bilateral pneumonia.

5.  Anemia, status post blood transfusion.

6.  Deconditioning and difficulty walking.



PLAN:  The patient is currently on nebulizer treatment.  Since he is doing well, I will discontinue t
hat.  Will keep him q. 2 hours p.r.n. and continue him on aspirin.  His blood sugar is being monitore
d.  He is on amlodipine 10 mg daily.  Will cut down his steroid to 10 mg.  Discussed with social serv
ices.  They are trying to make arrangements in [facility] where he can get dialysis if bed is av
lable.  He will be transferred to [facility] for subacute rehabilitation and hemodialysis later on
 today.





__________________________________________

Antonieta Rae MD







cc:



DD: 05/30/2017 11:55:44  [phone]

TT: 05/30/2017 15:29:11

Confirmation # [id number]

Dictation # [id number]

rn

## 2017-05-30 NOTE — PN
DATE: 05/29/2017



REASON FOR CONSULTATION AND FOLLOWUP:  Status post respiratory failure, acute liver failure, improved
, status post intubated, status post successfully extubated, nonobstructive coronary artery disease.



BRIEF CLINICAL HISTORY:  This is a 61-year-old morbidly obese male with past medical history signific
ant for diabetes, hypertension, hyperlipidemia, end-stage renal disease on dialysis, status post card
iac catheterization 1/12/2017, nonobstructive coronary artery disease.  Admitted with shortness of br
eath, acute liver failure, AST in 7000.  Now, patient successfully extubated, liver function back to 
normal.  Denies any chest pain, shortness of breath, any palpitation.



PHYSICAL EXAMINATION:

VITAL SIGNS:  Temperature afebrile, heart rate _____, blood pressure 174/76.

HEENT:  PERRLA.  Extraocular muscles intact.

NECK:  Supple.  No carotid bruits.  No thyromegaly.

CHEST:  Clear to auscultation.

HEART:  S1, S2 regular.

ABDOMEN:  Soft.

EXTREMITIES:  Clubbing, cyanosis negative.



BLOOD WORKUP:  WBC 9.3, hemoglobin 8._____, hematocrit 26.0, platelet count 210.  Chemistry shows sod
ium 130, potassium 3.3, chloride 95, carbon dioxide 22, anion gap of 16, BUN 78, creatinine 9.3.  The
 patient is having dialysis now.  Potassium is during the dialysis, postdialysis.



IMPRESSION:  Obesity, end-stage renal disease on dialysis, hypertension, acute liver failure, improve
d, status post intubated, now improved, extubated, coronary artery disease, nonobstructive, status po
st cardiac catheterization 1/12/2017, diabetes, hypertension, hyperlipidemia.



RECOMMENDATION:  Continue amlodipine.  Continue lisinopril.  Continue baby aspirin.  We will follow w
ith you.  Discharge planning.



Thank you, Dr. Rae, for providing us the opportunity in taking care of the patient.





__________________________________________

Sunil Thompson MD







cc:



DD: 05/29/2017 10:56:01  305

TT: 05/29/2017 13:01:44

Confirmation # [id number]

Dictation # [id number]

en

## 2017-05-30 NOTE — PN
DATE: 05/27/2017



REASON FOR CONSULTATION AND FOLLOWUP:  Status post respiratory failure, acute liver failure improved,
 status post intubated and successfully extubated, now for coronary artery disease.



BRIEF CLINICAL HISTORY:  A 61-year-old morbidly obese male with past medical history significant for 
diabetes, hypertension, hyperlipidemia, end-stage renal disease on dialysis, cardiac catheterization 
1/12/2017 shows nonobstructive coronary artery disease, admitted with shortness of breath, acute live
r failure, AST is 7000.  Now patient is successfully extubated, liver function came back to normal.  
Denies any chest pain, shortness of breath, end-stage renal disease on dialysis.



PHYSICAL EXAMINATION:

VITAL SIGNS:  Temperature afebrile, heart rate ____ , blood pressure 162/78.

HEENT:  PERRLA. Extraocular muscles intact.

NECK:  Supple.  No carotid bruits.  No thyromegaly.

CHEST:  Clear to auscultation.

HEART:  S1, S2 regular.

ABDOMEN:  Soft.

EXTREMITIES:  Clubbing and cyanosis negative.



LABORATORY DATA:  Blood workup as follows:  WBC 7.____, hemoglobin 8.8, hematocrit 27, platelet count
 134.  Chemistry shows sodium 133, potassium 3.____, carbon dioxide 25, anion gap of 14, BUN 44, pota
ssium 4.9.



IMPRESSION:  Acute liver failure, improved, liver function came back to normal almost, AST 60, ALT 10
9 from 7000.  End-stage renal disease on dialysis, nonobstructive coronary artery disease, morbid obe
sity, respiratory failure, intubated and successfully extubated.



RECOMMENDATION:  Avoid hepatotoxic medication.  Continue dialysis, hydralazine.  We will increase the
 Vasotec, continue amlodipine.  The patient was on Vasotec 20, will increase lisinopril to 10 from to
day, ____ from tomorrow.  We will follow with you.



Thank you, Dr. Rae, for providing the opportunity in the patient.  No further cardiac workup is p
lanned.  



The patient had repeat echo cardiographic on 5/17/2017 that showed RV is moderately reduced, ejection
 fraction 45% - ____%, mild to moderate tricuspid regurgitation, mild mitral regurgitation, trace to 
mild aortic regurgitation.  We will follow with you.





__________________________________________

Sunil Thompson MD







cc:



DD: 05/27/2017 13:56:00  305

TT: 05/27/2017 17:09:30

Confirmation # [id number]

Dictation # [id number]

jn

## 2017-05-30 NOTE — PN
DATE: 05/30/2017



REASON FOR CONSULTATION AND FOLLOWUP:  Status post respiratory failure, acute liver failure, improved
, status post intubated and successfully extubated, nonobstructive coronary artery disease.



BRIEF CLINICAL HISTORY:  This is a 61-year-old morbidly obese male with past medical history signific
ant for diabetes, hypertension, hyperlipidemia, end-stage renal disease on dialysis, status post card
iac catheterization 1/12/2017, nonobstructive CAD,  admitted with shortness of breath, acute liver fa
ilure, ALT is 7000, now patient successfully extubated. Liver came back to normal.  Denies any chest 
pain, shortness of breath, any palpitation.



PHYSICAL EXAMINATION:

VITAL SIGNS:  Temperature afebrile, heart rate 85, blood pressure 134/72.

HEENT:  PERRLA. Extraocular muscles intact.

NECK:  Supple.  No carotid bruits.  No thyromegaly.

CHEST:  Clear to auscultation.

HEART:  S1, S2 regular.

ABDOMEN:  Soft.

EXTREMITIES:  Clubbing and cyanosis negative.



LABORATORY DATA:  Blood workup as follows:  WBC 9.3, hemoglobin _____, hematocrit 26, platelet count 
210.  Chemistry shows sodium 130, potassium 3.3, chloride 95, carbon dioxide 22, anion gap of 15, BUN
 78, creatinine 9.3.



IMPRESSION:  Obesity, end-stage renal disease on dialysis, hypertension, acute liver failure, improve
d, status post intubated, now improved, extubated, no evidence of acute coronary syndrome, status pos
t cardiac catheterization, nonobstructive coronary artery disease, hypertension, diabetes, obesity, c
ardiac catheterization done on 1/12/2017.



RECOMMENDATION:  Continue amlodipine.  Continue lisinopril.  Continue baby aspirin.  Discharge planni
ng is stable.  We will follow with you.



Thank you, Dr. Rae, for providing the opportunity in taking care of this patient.





__________________________________________

Sunil Thompson MD







cc:



DD: 05/30/2017 11:37:55  [phone]

TT: 05/30/2017 12:05:21

Confirmation # [id number]

Dictation # [id number]

lakshmi

## 2017-05-31 LAB
ADD MANUAL DIFF?: NO
ALBUMIN/GLOB SERPL: 0.8 {RATIO}
ALP SERPL-CCNC: 102 U/L
ALT SERPL-CCNC: 56 U/L
APTT BLD: 23.1 SECONDS
AST SERPL-CCNC: 52 U/L
BASOPHILS # BLD AUTO: 0.01 K/MM3
BASOPHILS NFR BLD: 0.1 %
BILIRUB SERPL-MCNC: 1.3 MG/DL
BUN SERPL-MCNC: 49 MG/DL
CALCIUM SERPL-MCNC: 9.4 MG/DL
CHLORIDE SERPL-SCNC: 100 MMOL/L
CO2 SERPL-SCNC: 24 MMOL/L
EOSINOPHIL # BLD: 0.1 10*3/UL
EOSINOPHIL NFR BLD: 1.5 %
ERYTHROCYTE [DISTWIDTH] IN BLOOD BY AUTOMATED COUNT: 18.9 %
GLOBULIN SER-MCNC: 3.9 GM/DL
GLUCOSE SERPL-MCNC: 360 MG/DL
GRANULOCYTES # BLD: 5.65 10*3/UL
GRANULOCYTES NFR BLD: 76.9 %
HCT VFR BLD CALC: 26.3 %
INR PPP: 1.08
LYMPHOCYTES # BLD: 1.1 10*3/UL
LYMPHOCYTES NFR BLD AUTO: 14.3 %
MAGNESIUM SERPL-MCNC: 1.8 MG/DL
MCH RBC QN AUTO: 30.3 PG
MCHC RBC AUTO-ENTMCNC: 33.1 G/DL
MCV RBC AUTO: 91.6 FL
MONOCYTES # BLD AUTO: 0.5 10*3/UL
MONOCYTES NFR BLD: 7.2 %
PHOSPHATE SERPL-MCNC: 3 MG/DL
PLATELET # BLD: 271 10^3/UL
PMV BLD AUTO: 8.8 FL
POTASSIUM SERPL-SCNC: 3.9 MMOL/L
PROT SERPL-MCNC: 7.2 G/DL
SODIUM SERPL-SCNC: 134 MMOL/L
WBC # BLD AUTO: 7.4 10^3/UL

## 2017-05-31 NOTE — PN
DATE: 05/31/2017



REASON FOR CONSULTATION AND FOLLOWUP:  Status post respiratory failure, acute liver failure, improved
, status post intubated and successfully extubated, nonobstructive coronary artery disease.



BRIEF CLINICAL HISTORY:  This is a 61-year-old morbidly obese male with a past medical history signif
icant for diabetes, hypertension, hyperlipidemia, end-stage renal disease on dialysis, status post ca
rdiac catheterization 1/12/2017, nonobstructive coronary artery disease.  Admitted with shortness of 
breath, acute liver failure, ALT yesterday 7000.  Now, patient successfully extubated.  Liver functio
n came back to normal.  Denies any chest pain, shortness of breath, any palpitation, getting dialysis
.



PHYSICAL EXAMINATION:

VITAL SIGNS:  Temperature afebrile, heart rate 86, blood pressure 138/89.

HEENT:  PERRLA.  Extraocular muscles intact.

NECK:  Supple.  No carotid bruits.  No thyromegaly.

CHEST:  Clear to auscultation.

HEART:  S1, S2 regular.

ABDOMEN:  Soft.

EXTREMITIES:  Clubbing, cyanosis negative.



BLOOD WORKUP:  WBC 7.4, hemoglobin 8._____, hematocrit 26.3, platelet count 271.  Chemistry shows sod
ium 134, potassium 3.9, chloride 100, carbon dioxide 24, anion gap of 14, BUN 49, creatinine 8.3.  To
maryjane protein 7.2, albumin 3.3, albumin/globulin ratio 0.8.



IMPRESSION:  Acute liver failure, improved, acute respiratory failure, intubated, morbid obesity, end
-stage renal disease on dialysis, nonobstructive coronary artery disease, status post cardiac cathete
rization on 1/12/2017, hypertension, hyperlipidemia, obesity, diabetes.



RECOMMENDATION:  Continue amlodipine, continue lisinopril, continue baby aspirin.  Discharge planning
.  No further cardiac workup is planned.



Thank you, Dr. Rae, for providing us the opportunity in taking care of the patient.  Will follow 
with you.





__________________________________________

Sunil Thompson MD







cc:



DD: 05/31/2017 17:22:21  305

TT: 05/31/2017 17:42:33

Confirmation # [id number]

Dictation # [id number]

en

## 2017-05-31 NOTE — PN
DATE: 05/31/2017



SUBJECTIVE:  The patient is seen in the dialysis unit.  He is awake.  He is alert.  He is oriented x 
3.  He denies any pain.  He still complains of cough.  He reports he is bringing up thick ____sputum.




PHYSICAL EXAMINATION:

GENERAL:  Elderly male sitting in the dialysis unit.

VITAL SIGNS:  Blood pressure 138/89, heart rate 86, respiratory rate 20, temperature 97.9.

HEENT:  Normocephalic, atraumatic, positive pallor.

NECK:  Supple, no JVD.

LUNGS:  Bilateral rhonchi, bilateral basal rales.

CARDIAC:  S1, S2, regular rate and rhythm, no murmur, no rub.

ABDOMEN:  Obese, distended, soft, nontender, bowel sounds present.

EXTREMITIES:  No lower extremity edema.



LABORATORY DATA:  Hemoglobin 8.7, hematocrit 26, platelets 271.  Sodium 134, potassium 3.9, chloride 
100, CO2 24, BUN 49, creatinine 8.3, glucose 360, calcium 9.4, phosphorus 3.0, magnesium 1.8.



CURRENT MEDICATIONS:  DuoNeb, aspirin, heparin, insulin, amlodipine 10, prednisone 10, Protonix, [place]
gel 1600 t.i.d., p.o. vancomycin not given, rifaximin not given, lisinopril 10.



ASSESSMENT:

1.  Status post sepsis, respiratory failure, methicillin resistant staphylococcus aureus pneumonia.

2.  Severe anemia.

3.  End-stage renal disease.

4.  Status post acute ____ hepatitis.

5.  Obesity.

6.  Peripheral arterial disease.



PLAN:

1.  Stable dialysis.

2.  Discontinue p.o. vancomycin soon.

3.  Off rifaximin.

4.  Off antibiotics for Staph pneumonia.





__________________________________________

Lavonne Maria MD







cc:



DD: 05/31/2017 16:58:12  [phone]

TT: 05/31/2017 17:31:14

Confirmation # [id number]

Dictation # [id number]

jn

## 2017-05-31 NOTE — PN
DATE: 05/31/2017



SUBJECTIVE:  The patient is 61 years old, seen and examined, sitting in chair, complaining of general
ized weakness and cannot stand up and walk without assistance.  Awaiting subacute rehab.



PHYSICAL EXAMINATION:

GENERAL:  Awake and alert, communicative.

VITAL SIGNS:  He is afebrile, pulse 84, respirations 20, blood pressure 169/85.

LUNGS:  Bilateral fair airflow.  No rhonchi or crackle.

HEART:  S1, S2 audible.

ABDOMEN:  Soft, nontender.  No rebound, no guarding.

NEUROLOGIC:  The patient is awake and alert, communicative.



LABORATORY EXAM:  WBC 7.4, hemoglobin 8.7, hematocrit 26.3, platelets of 271.  Chemistry:  Sodium 134
, potassium 3.9, chloride 100, CO2 of 24.  BUN 49, creatinine 8.3.  Blood sugar of 360.



ASSESSMENT AND PLAN:

1.  Gallstone pancreatitis.

2.  History of alcohol use prior to the episode of acute hepatic failure.  

3.  Bilateral methicillin-resistant Staphylococcus aureus pneumonia.

4.  Status post respiratory failure.

5.  End-stage renal disease, on hemodialysis.

6.  History of hypertension.

7.  Chronic anemia, status post multiple blood transfusions.



PLAN:  At this point, the patient is still on Xifaxan that probably can be discontinued.  He is on p.
o. vanco.  We will maintain him on lisinopril.  He is on Protonix, and we will discontinue prednisone
 after 5 days, and monitor his blood sugar.  As soon as arrangements for subacute rehab is made, we w
ill make a discharge plan.





__________________________________________

Antonieta Rae MD







cc:



DD: 05/31/2017 12:47:20  [phone]

TT: 05/31/2017 13:13:24

Confirmation # [id number]

Dictation # [id number]

helen

## 2017-05-31 NOTE — PN
DATE: 05/30/2017



ADDENDUM:  This patient was seen and evaluated earlier.  This is an addendum to the GI progress repor
t dictated by LAURA Flannery.



This 61-year-old patient with end-stage renal disease, admitted with acute liver failure, sepsis, pne
umonia, MRSA pneumonia.  The patient has been improving clinically well.  The patient is planned to b
e transferred to rehab place, rehab center.  His LFTs are nearly normalized.  Except total bilirubin 
1.5.  ALP has come down to 74, significant improvement.  The patient did have an episode of pancreati
tis which was thought to not be secondary to the gallstone even though he has a gallstone and thought
 to be at that time related to the sepsis and also be drug induced.  Repeat ultrasound also showed no
rmal gallstones, normal CBD.  No stones, but repeat ultrasound showed no stones, but however, the, ho
wever, the ultrasound done on 5/15 was suggestive of gallstones.  The reasonable thing at this point 
is clinically follow the patient.  _____  clinical course.  He may benefit from repeating the ultraso
und in, in a month, in about 2 months', 1-2 months' time.  



Thank you very much.  Thank you very much for allowing us to participate in the care of the patient.





__________________________________________

Leydi Morales MD







cc:



DD: 05/30/2017 23:44:19  [phone]

TT: 05/31/2017 05:04:49

Confirmation # [id number]

Dictation # [id number]

humberto

## 2017-05-31 NOTE — CP.PCM.PN
Subjective





- Date & Time of Evaluation


Date of Evaluation: 05/31/17


Time of Evaluation: 10:20





- Subjective


Subjective: 





Comfortable, not in distress, afebrile, no more diarrhea, no abdominal pain.





Objective





- Vital Signs/Intake and Output


Vital Signs (last 24 hours): 


 











Temp Pulse Resp BP Pulse Ox


 


 97.9 F   86   20   138/89   100 


 


 05/31/17 16:00  05/31/17 16:46  05/31/17 16:00  05/31/17 16:47  05/31/17 16:00








Intake and Output: 


 











 05/31/17 05/31/17





 06:59 18:59


 


Intake Total 420 540


 


Output Total  0


 


Balance 420 540














- Medications


Medications: 


 Current Medications





Albuterol/Ipratropium (Duoneb 3 Mg/0.5 Mg (3 Ml) Ud)  3 ml IH Q2H PRN


   PRN Reason: Shortness of Breath


   Last Admin: 05/30/17 16:41 Dose:  3 ml


Amlodipine Besylate (Norvasc)  10 mg [facility] DAILY [facility]


   Last Admin: 05/31/17 16:47 Dose:  10 mg


Aspirin (Ecotrin)  81 mg PO DAILY [facility]


   Last Admin: 05/31/17 16:46 Dose:  81 mg


Heparin Sodium (Porcine) (Heparin)  5,000 units SC Q12 [facility]


   PRN Reason: Protocol


   Last Admin: 05/30/17 22:15 Dose:  5,000 units


Hydralazine HCl (Apresoline)  10 mg IVP Q6 PRN


   PRN Reason: SBP >180


   Last Admin: 05/17/17 02:05 Dose:  10 mg


Insulin Detemir (Levemir)  20 unit SC DAILY [facility]


   Last Admin: 05/31/17 10:22 Dose:  20 unit


Insulin Human Regular (Humulin R Med)  0 units SC Q4 [facility]


   Last Admin: 05/31/17 16:44 Dose:  5 units


Lisinopril (Zestril)  10 mg PO DAILY [facility]


   Last Admin: 05/31/17 16:46 Dose:  10 mg


Pantoprazole Sodium (Protonix Ec Tab)  40 mg PO 0630 [facility]


   Last Admin: 05/31/17 05:30 Dose:  40 mg


Prednisone (Prednisone Tab)  10 mg PO DAILY [facility]


   Last Admin: 05/31/17 08:16 Dose:  10 mg


Rifaximin (Xifaxan)  550 mg PO BID [facility]


   PRN Reason: Protocol


   Last Admin: 05/31/17 10:22 Dose:  Not Given


Sevelamer HCl (Renagel)  1,600 mg PO TID [facility]


   Last Admin: 05/31/17 16:46 Dose:  1,600 mg


Vancomycin HCl (Vancocin 25 Mg/Ml (Oral Use))  125 mg PO QID [facility]


   PRN Reason: Protocol


   Last Admin: 05/31/17 14:00 Dose:  Not Given











- Labs


Labs: 


 





 05/31/17 10:53 





 05/31/17 10:53 





 











PT  11.7 Seconds (9.9-11.8)   05/31/17  12:30    


 


INR  1.08  (0.93-1.08)   05/31/17  12:30    


 


APTT  23.1 Seconds (23.7-30.8)  L  05/31/17  12:30    














- Constitutional


Appears: Non-toxic, No Acute Distress





- Head Exam


Head Exam: NORMAL INSPECTION





- ENT Exam


ENT Exam: Mucous Membranes Moist





- Neck Exam


Neck Exam: absent: Lymphadenopathy, Meningismus





- Respiratory Exam


Respiratory Exam: Decreased Breath Sounds





- Cardiovascular Exam


Cardiovascular Exam: +S1, +S2





- GI/Abdominal Exam


GI & Abdominal Exam: Soft.  absent: Tenderness





Assessment and Plan





- Assessment and Plan (Free Text)


Plan: 





Assessment


S/P Severe sepsis with ventilator-dependent respiratory failure and acute liver 

failure/acute fulminant hepatitis and acute pancreatitis due to bilateral 

healthcare-associated pneumonia with sputum cx now growing MRSA, S/P treatment


rule out C. diff. infection, clinically improved 


Acute pancreatitis, slowly improving


HTN


ESRD on HD


dyslipidemia


morbid obesity with BMI 45 


positive HIV JEANNETTE test with negative HIV-1 PCR





Plan


on PO vancomycin (day 11 of 10-14 days) - will d/c Vancomycin and observe


patient has history of multiple positive HIV JEANNETTE, even the 4th generation 

test - HIV-1 PCR still negative - may be a false positive test


will continue to monitor clinically

## 2017-06-01 RX ADMIN — INSULIN HUMAN SCH UNITS: 100 INJECTION, SOLUTION PARENTERAL at 12:05

## 2017-06-01 RX ADMIN — INSULIN HUMAN SCH: 100 INJECTION, SOLUTION PARENTERAL at 00:36

## 2017-06-01 RX ADMIN — INSULIN DETEMIR SCH UNIT: 100 INJECTION, SOLUTION SUBCUTANEOUS at 11:00

## 2017-06-01 RX ADMIN — INSULIN HUMAN SCH: 100 INJECTION, SOLUTION PARENTERAL at 22:20

## 2017-06-01 RX ADMIN — INSULIN HUMAN SCH UNITS: 100 INJECTION, SOLUTION PARENTERAL at 08:39

## 2017-06-01 RX ADMIN — INSULIN HUMAN SCH UNITS: 100 INJECTION, SOLUTION PARENTERAL at 16:52

## 2017-06-01 RX ADMIN — PANTOPRAZOLE SODIUM SCH MG: 40 TABLET, DELAYED RELEASE ORAL at 05:37

## 2017-06-01 RX ADMIN — INSULIN HUMAN SCH: 100 INJECTION, SOLUTION PARENTERAL at 04:04

## 2017-06-01 NOTE — PN
DATE: 06/01/2017



The patient is in room 564, bed 1.



REASON FOR CONSULTATION AND FOLLOWUP:  Status post respiratory failure, acute liver failure, improved
, status post intubation and successful extubation, nonobstructive coronary artery disease.



HISTORY OF PRESENT ILLNESS:  A 61-year-old morbidly obese male with past medical history significant 
for diabetes, hypertension, hyperlipidemia, end-stage renal failure on dialysis, status post cardiac 
catheterization 1/12/2017 which showed nonobstructive coronary artery disease, was admitted with shor
tness of breath, was found to have acute liver failure.  The patient was put on respirator, then succ
essfully extubated and liver function improved.  The patient is sitting in bed right now without any 
cardiac symptoms.



PHYSICAL EXAMINATION:

VITAL SIGNS:  Blood pressure 135/67, respirations 20, pulse 80, temperature 98.5.

HEAD:  Normocephalic.

EYES:  Pupils normal.  Conjunctivae slightly pale.

NECK:  JVP low.  Carotid equal.

THORAX:  AP diameter normal.

LUNGS:  Clear.

CARDIOVASCULAR:  S1, S2.

ABDOMEN:  Protuberant, no organomegaly.

EXTREMITIES:  No clubbing, no cyanosis.



LABORATORIES:  WBC 7.4, hemoglobin 8.7, hematocrit 26.3, platelet 271.  Sodium 134, potassium 3.9, BU
N 49, creatinine 8.3.  AST, ALT normal.  Total protein, albumin normal.  Random sugar 360, then repea
t one 231.



DIAGNOSES:  Acute liver failure, improved, acute respiratory failure, status post intubation, status 
post successful extubation, morbid obesity, end-stage renal disease on dialysis, nonobstructive coron
martha disease on cardiac catheterization on 1/12/2017, hypertension, hyperlipidemia, morbid obesity, di
abetes mellitus.



PLAN:  Continue dialysis as scheduled, amlodipine 10 mg daily, prednisone 10 mg p.o. daily, Protonix 
40 daily, lisinopril 10 mg daily, DuoNeb hand nebulizer therapy, heparin 5000 units subQ q. 12 hours,
 insulin as ordered.  We will continue present therapy.  We will follow with you.





__________________________________________

Sunil Sanchez MD







cc:



DD: 06/01/2017 12:19:30  [phone]

TT: 06/01/2017 12:46:37

Confirmation # [id number]

Dictation # [id number]

en

## 2017-06-01 NOTE — PN
DATE: 06/01/2017



SUBJECTIVE:  The patient is seen lying in bed.  He is awake, he is alert.  He is oriented x 3.  He de
nies any pain.  He complains of cough.  He complains of shortness of breath.



PHYSICAL EXAMINATION:

GENERAL:  Obese elderly male sitting in bed.

VITAL SIGNS:  Blood pressure 134/67, heart rate 74, respiratory rate 20, temperature 98.5.

HEENT:  Normocephalic, atraumatic.

NECK:  Supple, no JVD.

LUNGS:  Bilateral equal air entry, no rales.

CARDIAC:  S1, S2, regular rate and rhythm, no murmur, no rub.

ABDOMEN:  Obese, distended, soft, nontender, bowel sounds present.

EXTREMITIES:  No edema.

INTAKE AND OUTPUT:  Not charted.



LABORATORY DATA:  No new labs today.



CURRENT MEDICATIONS:  DuoNeb, Ecotrin, aspirin, insulin, heparin, amlodipine 10, prednisone, Protonix
, Renagel, lisinopril 10, rifaximin.



ASSESSMENT:

1.  Status post sepsis.

2.  Methicillin-resistant Staphylococcus aureus pneumonia.

3.  Status post respiratory failure.

4.  Status post acute fulminant hepatitis.

5.  Hypertension.

6.  End-stage renal disease.



PLAN:

1.  Status post stable dialysis yesterday.

2.  Continue amlodipine and Zestril.

3.  Continue phosphate binders.

4.  Taper off prednisone.

5.  Complete antibiotics as per ID recommendations.





__________________________________________

Lavonne Maria MD







cc:



DD: 06/01/2017 13:30:57  [phone]

TT: 06/01/2017 14:06:50

Confirmation # [id number]

Dictation # [id number]

tn

## 2017-06-01 NOTE — PN
DATE: 06/01/2017



The patient is a 61-year-old, seen and examined, sitting in bed.  The patient was admitted with intra
ctable nausea and vomiting.  He went into acute hepatic failure.  His transaminases went up to thousa
nds.  Etiology at that point was not clear.  However, sonogram showed gallstone and gallbladder sludg
e.  The patient was also intubated because he became increasingly lethargic.  By carefully giving him
 fluid, antibiotics and vent support, he started to improve and he was successfully extubated, transf
erred to med/surg floor.



PHYSICAL EXAMINATION:

GENERAL:  Today, he is awake and alert, communicative, ambulates with a walker, although it is very l
imited.  He is very deconditioned since he was on vent for more than 10 days.

VITAL SIGNS:  He is afebrile, pulse 80, respirations 20, blood pressure 135/67.

LUNGS:  Bilateral fair airflow, no rhonchi or crackle.

HEART:  S1, S2 audible.

ABDOMEN:  Soft, obese, nontender, no rebound, no guarding.

NEUROLOGIC:  The patient is awake and alert, communicative.



Blood sugar is 231.



ASSESSMENT:

1.  Status post hepatic failure.

2.  Status post gallbladder, pancreatitis.

3.  Status post obstructive jaundice.

4.  Cholelithiasis.

5.  End-stage renal disease, on hemodialysis.

6.  Status post respiratory failure.

7.  Insulin-dependent diabetes.



PLAN:  The patient is clinically stable.  He is being discharged to subacute rehab in [facility] has a facility of a built-in dialysis center, so he will get hemodialysis along with physical the
rapy.  I will discontinue Xifaxan.  Will continue him on lisinopril 10 mg daily, Renagel, Protonix.  
I will discontinue prednisone.  Continue to monitor his blood sugar.  He will be admitted under Dr. _
____ service in [facility].





__________________________________________

Antonieta Rae MD







cc:



DD: 06/01/2017 12:48:38  [phone]

TT: 06/01/2017 13:51:05

Confirmation # [id number]

Dictation # [id number]

en

## 2017-06-01 NOTE — CP.PCM.PN
Subjective





- Date & Time of Evaluation


Date of Evaluation: 06/01/17


Time of Evaluation: 09:45





- Subjective


Subjective: 





Comfortable, afebrile, no diarrhea currently, improved abdominal pain.





Objective





- Vital Signs/Intake and Output


Vital Signs (last 24 hours): 


 











Temp Pulse Resp BP Pulse Ox


 


 98.5 F   80   20   135/66   100 


 


 06/01/17 07:30  06/01/17 07:30  06/01/17 07:30  06/01/17 07:30  06/01/17 07:30








Intake and Output: 


 











 06/01/17 06/01/17





 06:59 18:59


 


Intake Total 900 


 


Balance 900 














- Medications


Medications: 


 Current Medications





Albuterol/Ipratropium (Duoneb 3 Mg/0.5 Mg (3 Ml) Ud)  3 ml IH Q2H PRN


   PRN Reason: Shortness of Breath


   Last Admin: 05/30/17 16:41 Dose:  3 ml


Amlodipine Besylate (Norvasc)  10 mg PO DAILY [facility]


   Last Admin: 05/31/17 16:47 Dose:  10 mg


Aspirin (Ecotrin)  81 mg PO DAILY [facility]


   Last Admin: 05/31/17 16:46 Dose:  81 mg


Heparin Sodium (Porcine) (Heparin)  5,000 units SC Q12 [facility]


   PRN Reason: Protocol


   Last Admin: 05/31/17 21:35 Dose:  5,000 units


Hydralazine HCl (Apresoline)  10 mg IVP Q6 PRN


   PRN Reason: SBP >180


   Last Admin: 05/17/17 02:05 Dose:  10 mg


Insulin Detemir (Levemir)  20 unit SC DAILY [facility]


   Last Admin: 05/31/17 10:22 Dose:  20 unit


Insulin Human Regular (Humulin R Med)  0 units SC Q4 [facility]


   Last Admin: 06/01/17 08:39 Dose:  1 units


Lisinopril (Zestril)  10 mg PO DAILY [facility]


   Last Admin: 05/31/17 16:46 Dose:  10 mg


Pantoprazole Sodium (Protonix Ec Tab)  40 mg PO 0630 [facility]


   Last Admin: 06/01/17 05:37 Dose:  40 mg


Prednisone (Prednisone Tab)  10 mg PO DAILY [facility]


   Last Admin: 05/31/17 08:16 Dose:  10 mg


Rifaximin (Xifaxan)  550 mg PO BID [facility]


   PRN Reason: Protocol


   Last Admin: 05/31/17 18:31 Dose:  550 mg


Sevelamer HCl (Renagel)  1,600 mg PO TID [facility]


   Last Admin: 06/01/17 08:39 Dose:  1,600 mg











- Labs


Labs: 


 





 05/31/17 10:53 





 05/31/17 10:53 





 











PT  11.7 Seconds (9.9-11.8)   05/31/17  12:30    


 


INR  1.08  (0.93-1.08)   05/31/17  12:30    


 


APTT  23.1 Seconds (23.7-30.8)  L  05/31/17  12:30    














- Constitutional


Appears: Non-toxic, No Acute Distress





- Head Exam


Head Exam: NORMAL INSPECTION





- ENT Exam


ENT Exam: Mucous Membranes Moist





- Neck Exam


Neck Exam: absent: Lymphadenopathy, Meningismus





- Respiratory Exam


Respiratory Exam: Decreased Breath Sounds





- Cardiovascular Exam


Cardiovascular Exam: +S1, +S2





- GI/Abdominal Exam


GI & Abdominal Exam: Soft.  absent: Tenderness





Assessment and Plan





- Assessment and Plan (Free Text)


Plan: 





Assessment


S/P Severe sepsis with ventilator-dependent respiratory failure and acute liver 

failure/acute fulminant hepatitis and acute pancreatitis due to bilateral 

healthcare-associated pneumonia with sputum cx now growing MRSA, S/P treatment


rule out C. diff. infection, clinically improved and S/P treatment  


Acute pancreatitis, slowly improving


HTN


ESRD on HD


dyslipidemia


morbid obesity with BMI 45 


positive HIV JEANNETTE test with negative HIV-1 PCR





Plan


continue to monitor off antibiotics since he is at risk for nosocomial 

infections


continue to trend lipase levels


patient has history of multiple positive HIV JEANNETTE, even the 4th generation 

test - HIV-1 PCR still negative - may be a false positive test

## 2017-06-01 NOTE — PN
DATE: 05/31/2017



ADDENDUM



This is an addendum to the GI progress report.



SUBJECTIVE:  This patient was seen and evaluated earlier today.  The patient is comfortable.



PHYSICAL EXAMINATION:

VITAL SIGNS:  Temperature 97.9, blood pressure is 138/78, pulse is 86, respirations 20.

HEENT:  Atraumatic, anicteric.

HEENT:  Atraumatic, anicteric.

NECK:  Supple.

HEART:  S1, S2 heard.

LUNGS:  Bilateral air entry present. 



LABORATORY DATA:  Hemoglobin 8.7, hematocrit 26.3, WBC 7.3, and platelets _____.  LFTs appear normali
zed.



IMPRESSION:  This 61-year-old patient is admitted with sepsis, acute liver failure, patient on end-st
age renal disease on hemodialysis, liver function tests have normalized.  The patient did have an epi
sode of pancreatitis.  The ultrasound done before showed gallstones, but the repeat ultrasound showed
 no gallstones.  Clinically, the patient is improving.



Thank you very much for allowing us to participate in the care of the patient.





__________________________________________

Leydi Morales MD







cc:



DD: 05/31/2017 22:14:59  [phone]

TT: 06/01/2017 00:18:37

Confirmation # [id number]

Dictation # [id number]

mn

## 2017-06-01 NOTE — PN
DATE: 06/01/2017



Seen and examined at the bedside earlier this morning.  No complaints of nausea, vomiting or abdomina
l pain.  He does complain of some coughing and some chest discomfort.  He states that he slept with t
he air conditioner on and he was cold.  He denies chest pain or shortness of breath.  He is moving hi
s bowels.  No reports of bleeding.



VITAL SIGNS:  Temperature 98.5, blood pressure 135/66, pulse 80, respirations 20, and 100% on room ai
r.



BLOOD WORK:  No new labs are noted today except for POC glucose 231.  His last labs were taken yester
day on 5/31.  H and H is 8.7 and 26.3, has been steady. Sodium 134, K is 3.9.  His LFTs are normalize
d now.



PHYSICAL EXAMINATION:

HEENT:  Sclerae are anicteric.

NECK:  Supple.

CARDIAC:  S1, S2.

LUNGS:  Decreased breath sounds but good air entry, no rales or wheeze.

ABDOMEN:  With bowel sounds, soft, it is not tender.  No rebound or guarding.



ASSESSMENT AND PLAN:  Status post severe sepsis and respiratory failure.  The patient was in acute li
katherine failure and had acute pancreatitis, which has improved and also was status post bilateral pneumon
ia.  The patient also has a history of end-stage renal disease on dialysis.  His liver enzymes are no
w back to normal.  The patient did have a repeat ultrasound done, which is not showing any gallstones
.  Previous ultrasound did show gallstones before.  The patient continues to improve.  The patient is
 on heparin subQ q. 12, is on aspirin.  He is also on GI prophylaxis, Protonix, on prednisone and Xif
axan.  The patient was seen and case discussed with Dr. Morales.





__________________________________________

Naya HINES







cc:



DD: 06/01/2017 12:18:17  [phone]

TT: 06/01/2017 12:40:48

Confirmation # [id number]

Dictation # [id number]

rn

## 2017-06-02 VITALS
DIASTOLIC BLOOD PRESSURE: 60 MMHG | OXYGEN SATURATION: 99 % | RESPIRATION RATE: 18 BRPM | HEART RATE: 72 BPM | SYSTOLIC BLOOD PRESSURE: 130 MMHG

## 2017-06-02 VITALS — TEMPERATURE: 97.9 F

## 2017-06-02 LAB
ADD MANUAL DIFF?: NO
ALBUMIN/GLOB SERPL: 0.8 {RATIO}
ALP SERPL-CCNC: 113 U/L
ALT SERPL-CCNC: 62 U/L
AST SERPL-CCNC: 70 U/L
BASOPHILS # BLD AUTO: 0.05 K/MM3
BASOPHILS NFR BLD: 0.7 %
BILIRUB SERPL-MCNC: 1.2 MG/DL
BUN SERPL-MCNC: 36 MG/DL
CALCIUM SERPL-MCNC: 9.6 MG/DL
CHLORIDE SERPL-SCNC: 99 MMOL/L
CO2 SERPL-SCNC: 26 MMOL/L
EOSINOPHIL # BLD: 0.2 10*3/UL
EOSINOPHIL NFR BLD: 2 %
ERYTHROCYTE [DISTWIDTH] IN BLOOD BY AUTOMATED COUNT: 18.6 %
GLOBULIN SER-MCNC: 4.2 GM/DL
GLUCOSE SERPL-MCNC: 241 MG/DL
GRANULOCYTES # BLD: 5.07 10*3/UL
GRANULOCYTES NFR BLD: 67.7 %
HCT VFR BLD CALC: 30.2 %
LYMPHOCYTES # BLD: 1.6 10*3/UL
LYMPHOCYTES NFR BLD AUTO: 21.5 %
MCH RBC QN AUTO: 30 PG
MCHC RBC AUTO-ENTMCNC: 32.1 G/DL
MCV RBC AUTO: 93.5 FL
MONOCYTES # BLD AUTO: 0.6 10*3/UL
MONOCYTES NFR BLD: 8.1 %
PLATELET # BLD: 252 10^3/UL
PMV BLD AUTO: 9.4 FL
POTASSIUM SERPL-SCNC: 3.3 MMOL/L
PROT SERPL-MCNC: 7.7 G/DL
SODIUM SERPL-SCNC: 135 MMOL/L
WBC # BLD AUTO: 7.5 10^3/UL

## 2017-06-02 RX ADMIN — INSULIN HUMAN SCH: 100 INJECTION, SOLUTION PARENTERAL at 00:05

## 2017-06-02 RX ADMIN — INSULIN HUMAN SCH UNITS: 100 INJECTION, SOLUTION PARENTERAL at 08:50

## 2017-06-02 RX ADMIN — INSULIN HUMAN SCH: 100 INJECTION, SOLUTION PARENTERAL at 03:45

## 2017-06-02 RX ADMIN — PANTOPRAZOLE SODIUM SCH MG: 40 TABLET, DELAYED RELEASE ORAL at 05:30

## 2017-06-02 NOTE — CP.PCM.PN
Subjective





- Date & Time of Evaluation


Date of Evaluation: 06/02/17


Time of Evaluation: 10:25





- Subjective


Subjective: 





Comfortable, afebrile, no abdominal pain, no diarrhea, no nausea.





Objective





- Vital Signs/Intake and Output


Vital Signs (last 24 hours): 


 











Temp Pulse Resp BP Pulse Ox


 


 97.9 F   72   18   130/60   99 


 


 06/02/17 07:30  06/02/17 07:30  06/02/17 07:30  06/02/17 07:30  06/02/17 07:30








Intake and Output: 


 











 06/02/17 06/02/17





 06:59 18:59


 


Intake Total 660 


 


Output Total 0 


 


Balance 660 














- Medications


Medications: 


 Current Medications





Albuterol/Ipratropium (Duoneb 3 Mg/0.5 Mg (3 Ml) Ud)  3 ml IH Q2H PRN


   PRN Reason: Shortness of Breath


   Last Admin: 05/30/17 16:41 Dose:  3 ml


Amlodipine Besylate (Norvasc)  10 mg PO DAILY [facility]


   Last Admin: 06/01/17 11:00 Dose:  10 mg


Aspirin (Ecotrin)  81 mg PO DAILY [facility]


   Last Admin: 06/01/17 11:00 Dose:  81 mg


Heparin Sodium (Porcine) (Heparin)  5,000 units SC Q12 [facility]


   PRN Reason: Protocol


   Last Admin: 06/01/17 22:18 Dose:  5,000 units


Hydralazine HCl (Apresoline)  10 mg IVP Q6 PRN


   PRN Reason: SBP >180


   Last Admin: 05/17/17 02:05 Dose:  10 mg


Insulin Detemir (Levemir)  20 unit SC DAILY [facility]


   Last Admin: 06/01/17 11:00 Dose:  20 unit


Insulin Human Regular (Humulin R Med)  0 units SC Q4 [facility]


   Last Admin: 06/02/17 03:45 Dose:  Not Given


Lisinopril (Zestril)  10 mg PO DAILY [facility]


   Last Admin: 06/01/17 10:59 Dose:  10 mg


Pantoprazole Sodium (Protonix Ec Tab)  40 mg PO 0630 [facility]


   Last Admin: 06/02/17 05:30 Dose:  40 mg


Prednisone (Prednisone Tab)  10 mg PO DAILY [facility]


   Last Admin: 06/01/17 11:01 Dose:  10 mg


Rifaximin (Xifaxan)  550 mg PO BID [facility]


   PRN Reason: Protocol


   Last Admin: 06/01/17 17:28 Dose:  550 mg


Sevelamer HCl (Renagel)  1,600 mg PO TID [facility]


   Last Admin: 06/01/17 17:29 Dose:  1,600 mg











- Labs


Labs: 


 





 05/31/17 10:53 





 05/31/17 10:53 





 











PT  11.7 Seconds (9.9-11.8)   05/31/17  12:30    


 


INR  1.08  (0.93-1.08)   05/31/17  12:30    


 


APTT  23.1 Seconds (23.7-30.8)  L  05/31/17  12:30    














- Constitutional


Appears: Non-toxic, No Acute Distress





- Head Exam


Head Exam: NORMAL INSPECTION





- ENT Exam


ENT Exam: Mucous Membranes Moist





- Neck Exam


Neck Exam: absent: Lymphadenopathy, Meningismus





- Respiratory Exam


Respiratory Exam: Decreased Breath Sounds





- Cardiovascular Exam


Cardiovascular Exam: +S1, +S2





- GI/Abdominal Exam


GI & Abdominal Exam: Soft.  absent: Tenderness





Assessment and Plan





- Assessment and Plan (Free Text)


Plan: 





Assessment


S/P Severe sepsis with ventilator-dependent respiratory failure and acute liver 

failure/acute fulminant hepatitis and acute pancreatitis due to bilateral 

healthcare-associated pneumonia with sputum cx now growing MRSA, S/P treatment


rule out C. diff. infection, clinically improved and S/P treatment  


Acute pancreatitis, slowly improving


HTN


ESRD on HD


dyslipidemia


morbid obesity with BMI 45 


positive HIV JEANNETTE test with negative HIV-1 PCR





Plan


continue to monitor off antibiotics since he is at risk for healthcare-

associated infections


continue to trend lipase levels


patient has history of multiple positive HIV JEANNETTE, even the 4th generation 

test - HIV-1 PCR still negative - may be a false positive test

## 2017-06-02 NOTE — PN
DATE: 06/02/2017



The patient is a 61-year-old seen and examined.  Going to dialysis.  He states his legs are heavy and
 feel weak.  Otherwise denies any nausea, vomiting or diarrhea.  He is eating and tolerating.  



PHYSICAL EXAMINATION:

VITAL SIGNS:  He is afebrile, pulse 72, respirations 18, blood pressure 130/60.

LUNGS:  Bilateral fair airflow, no rhonchi or crackle.

HEART:  S1, S2 audible.

ABDOMEN:  Soft, obese, nontender, no rebound, no guarding.

NEUROLOGIC:  He is awake and alert, able to communicate.

EXTREMITIES:  Bilateral legs, no edema.



LABORATORY DATA:  Blood sugar is 231.



ASSESSMENT:

1.  Status post respiratory failure, successfully extubated.

2.  Status post hepatic failure, improved.

3.  Methicillin-resistant Staphylococcus aureus pneumonia.

4.  Hypertension.

5.  End-stage renal disease, on hemodialysis.

6.  Insulin-dependent diabetes.

7.  Deconditioning and difficulty walking.

8.  Cholelithiasis.



PLAN:  I will continue on aspirin.  Since patient is more active, I will consider discontinuing hepar
in in a day or 2 when he is more ambulatory.  Continue on the Levemir, amlodipine.  I will discontinu
e Xifaxan.  Awaiting paperwork for insurance approval to be placed to subacute rehab.  Discussed with
 .





__________________________________________

Antonieta Rae MD







cc:



DD: 06/02/2017 12:18:28  [phone]

TT: 06/02/2017 12:32:17

Confirmation # [id number]

Dictation # [id number]

mn

## 2017-06-02 NOTE — PN
DATE: 06/02/2017



The patient in room 564, bed 1.



REASON FOR CONSULTATION AND FOLLOWUP:  Status post respiratory failure, acute liver failure, improved
, status post intubation, successful extubation, nonobstructive coronary artery disease.



HISTORY OF PRESENT ILLNESS:  A 61-year-old morbidly obese male with past medical history significant 
for diabetes, hypertension, hyperlipidemia, end-stage renal failure on dialysis, status post cardiac 
catheterization 1/12/2017, which showed nonobstructive coronary artery disease, was admitted with paulette
rtness of breath and found to have acute liver failure.  The patient was put on respirator, then succ
essfully extubated and liver function has improved.  The patient denies any chest pain, shortness of 
breath, palpitation at present.



PHYSICAL EXAMINATION:

VITAL SIGNS:  Blood pressure 130/60, respirations 18, pulse 72, temperature 97.9.

HEAD:  Normocephalic.

EYES:  Pupils normal.  Conjunctivae slightly pale.

NECK:  JVP low.  Carotid equal.

THORAX:  AP diameter normal.

LUNGS:  Clear.

CARDIOVASCULAR:  S1, S2.

ABDOMEN:  Soft, no tenderness, no organomegaly.  Bowel sounds normal.

EXTREMITIES:  No clubbing, no cyanosis.



LABORATORIES:  WBC 7.5, hemoglobin 9.7, hematocrit 30.2, platelet 252.  Sodium 135, potassium 3.3, BU
N 36, creatinine 7.2.  AST 70, ALT 62.  On 5/31, AST was 52 and ALT was 56.



DIAGNOSES:  Acute liver failure, improved, acute respiratory failure, status post intubation, status 
post successful extubation, morbid obesity, end-stage renal failure on dialysis, nonobstructive coron
martha artery disease on cardiac catheterization 1/12/2017, hypertension, hyperlipidemia, diabetes mariela priest, hypokalemia.



PLAN:  To continue dialysis as scheduled.  The patient is on DuoNeb hand nebulizer therapy, aspirin 8
1 mg daily, heparin 5000 units subQ q. 12 hours, insulin Levemir 20 units subQ daily, amlodipine 10 m
g daily, Protonix 40 daily, lisinopril 10 mg daily.  The patient will continue to have followup blood
 work with dialysis.  We will follow with you.





__________________________________________

Sunil Sanchez MD







cc:



DD: 06/02/2017 13:26:49  [id number]

TT: 06/02/2017 13:53:57

Confirmation # [id number]

Dictation # [id number]

en

## 2017-06-02 NOTE — CP.PCM.PN
Subjective





- Date & Time of Evaluation


Date of Evaluation: 06/01/17


Time of Evaluation: 19:00





- Subjective


Subjective: 





DATE: 06/01/2017





SUBJECTIVE:  The patient is comfortable, in no acute distress.  He was admitted 

with fulminant hepatitis and hepatic failure.  His condition is improving 

gradually.  He had leukocytosis with an elevated white count of 70,000.  White 

count has declined to normal now.  Flow cytometry and BCR-ABL on peripheral 

blood was normal.   He has not required blood transfusion in past few days.  

Complaining of constipation and weakness in the upper extremities and lower 

extremities, not able to ambulate without support.  No fever.  No cough with 

expectoration.


Dialysis today. Hb remains low around 8 gm/dl. 





REVIEW OF SYSTEMS:  As per HPI.  Rest of 12-point review of systems reviewed 

and negative.





PHYSICAL EXAMINATION:


GENERAL:  Comfortable in bed, in no acute distress.


VITAL SIGNS:  reviewed.


HEENT:  Normal.


CHEST:  Air entry present, equal bilateral.  No added sound.


CARDIOVASCULAR:  S1, S2 normal.  No murmur, no gallop.


ABDOMEN:  Soft, nontender, no hepatosplenomegaly.


EXTREMITIES:  No edema.


NEUROLOGIC:  Awake, alert, oriented x 3.  No focal sensorimotor deficit.


SKIN:  No petechia, no rash.





LABORATORY DATA:  reviewed.





MEDICATIONS:  DuoNeb 3 mL q. 4 hours p.r.n., Norvasc 10 mg daily, aspirin 81 mg 

daily, heparin 5000 subQ q. 12, hydralazine 10 mg q. 6 hours p.r.n., Levemir 20 

units subQ daily, regular insulin plus sliding scale, lisinopril 10 mg daily, 

Zofran 4 mg IV q. 6 hours p.r.n., Protonix 40 mg daily, prednisone 20 mg daily, 

Xifaxan 550 mg p.o. b.i.d., Renagel 1600 p.o. t.i.d., vancomycin 125 mg p.o. 

q.i.d.





ASSESSMENT:


1.  Leukocytosis, resolved.


2.  Severe anemia, hemoglobin and hematocrit stable.  Hemoglobin and hematocrit 

stable.  Iron studies showed iron saturation of 32%.  Anemia likely related to 

chronic kidney disease.  Aranesp 100 mcgm ordered.   


3.  End-stage renal disease, on hemodialysis.


4.  hepatitis, improving.


5.  Delirium, resolved


6. being discharged to sub acute rehab. 











Thank you, Dr. Rae, for allowing us to participate in the patient's care.








__________________________________________


Saira Olivo MD





Objective





- Vital Signs/Intake and Output


Vital Signs (last 24 hours): 


 











Temp Pulse Resp BP Pulse Ox


 


 97.4 F L  77   20   144/71   97 


 


 06/01/17 16:00  06/01/17 16:00  06/01/17 16:00  06/01/17 16:00  06/01/17 16:00








Intake and Output: 


 











 06/01/17 06/02/17





 18:59 06:59


 


Intake Total 960 420


 


Balance 960 420














- Medications


Medications: 


 Current Medications





Albuterol/Ipratropium (Duoneb 3 Mg/0.5 Mg (3 Ml) Ud)  3 ml IH Q2H PRN


   PRN Reason: Shortness of Breath


   Last Admin: 05/30/17 16:41 Dose:  3 ml


Amlodipine Besylate (Norvasc)  10 mg PO DAILY [facility]


   Last Admin: 06/01/17 11:00 Dose:  10 mg


Aspirin (Ecotrin)  81 mg PO DAILY [facility]


   Last Admin: 06/01/17 11:00 Dose:  81 mg


Heparin Sodium (Porcine) (Heparin)  5,000 units SC Q12 [facility]SE[facility]


   PRN Reason: Protocol


   Last Admin: 06/01/17 22:18 Dose:  5,000 units


Hydralazine HCl (Apresoline)  10 mg IVP Q6 PRN


   PRN Reason: SBP >180


   Last Admin: 05/17/17 02:05 Dose:  10 mg


Insulin Detemir (Levemir)  20 unit SC DAILY [facility]


   Last Admin: 06/01/17 11:00 Dose:  20 unit


Insulin Human Regular (Humulin R Med)  0 units SC Q4 [facility]


   Last Admin: 06/02/17 00:05 Dose:  Not Given


Lisinopril (Zestril)  10 mg PO DAILY [facility]


   Last Admin: 06/01/17 10:59 Dose:  10 mg


Pantoprazole Sodium (Protonix Ec Tab)  40 mg PO 0630 [facility]


   Last Admin: 06/01/17 05:37 Dose:  40 mg


Prednisone (Prednisone Tab)  10 mg PO DAILY [facility]


   Last Admin: 06/01/17 11:01 Dose:  10 mg


Rifaximin (Xifaxan)  550 mg PO BID [facility]


   PRN Reason: Protocol


   Last Admin: 06/01/17 17:28 Dose:  550 mg


Sevelamer HCl (Renagel)  1,600 mg PO TID [facility]


   Last Admin: 06/01/17 17:29 Dose:  1,600 mg











- Labs


Labs: 


 





 05/31/17 10:53 





 05/31/17 10:53 





 











PT  11.7 Seconds (9.9-11.8)   05/31/17  12:30    


 


INR  1.08  (0.93-1.08)   05/31/17  12:30    


 


APTT  23.1 Seconds (23.7-30.8)  L  05/31/17  12:30

## 2017-06-02 NOTE — PN
DATE: 06/02/2017



Seen and examined at the bedside earlier today.  The patient denies any nausea, vomiting, or abdomina
l pain.  No reports of any acute overnight events.  No new complaints.



VITAL SIGNS:  Temperature is 97.9, blood pressure 130/60, pulse 72, respirations 18, 99 on room air.



LABORATORY DATA:  WBC is 7.5, H and H is 9.7 and 30.2, platelets of 252.  Sodium is 135, K is 3.3, BU
N 36, creatinine 7.2.  Total bilirubin is 1.2, AST 70, ALT 62, alk phos is 113 Chad.  AST, ALT are 
elevated today.



PHYSICAL EXAMINATION:

HEENT:  Sclerae are anicteric.

NECK:  Supple.

CARDIAC:  S1, S2.

LUNGS:  Sound clear.

ABDOMEN:  With bowel sounds, soft, nontender.  No rebound or guarding.

NEUROLOGIC:  Awake, alert, and oriented.

EXTREMITIES:  No edema.



ASSESSMENT:  Status post respiratory failure, improved hepatic failure, end-stage renal disease on di
alysis.  The patient with methicillin-resistant Staphylococcus aureus pneumonia, cholelithiasis, hist
ory of diabetes mellitus.



PLAN:  The patient is noted to have mildly elevated liver enzymes today.  We will follow this closely
, could be medication related.  The patient is awaiting placement to subacute rehab.  He is off of pr
ednisone and Xifaxan.  Continue GI prophylaxis.  The patient is on DVT prophylaxis and aspirin.  He i
s waiting to go for dialysis today.  The patient is doing better.  The patient was seen and case disc
ussed with Dr. Morales.





__________________________________________

Naya HINES







cc:



DD: 06/02/2017 13:28:00  [id number]

TT: 06/02/2017 13:59:45

Confirmation # [id number]

Dictation # [id number]

tn

## 2017-06-21 NOTE — DS
HISTORY OF PRESENT ILLNESS:  The patient is a 61-year-old black male with longstanding history of end
-stage renal disease, hypertension, insulin-dependent diabetes.  The patient received hemodialysis.  
He does admit drinking a day prior to coming to the hospital and started to have nausea and vomiting 
with abdominal discomfort.  So the next day of his last dialysis while he was in the ER, he became so
mnolent, increasingly lethargic, so he was intubated and admitted in ICU.  He was found to have acute
 pancreatitis along with cholecystitis.  He became septic.  He remained on a respirator, had bilatera
l pneumonia and it was hard to wean him off.  However, he was successfully extubated and remained on 
IV antibiotics.  His electrolytes were closely monitored and he received multiple blood transfusions.
  He was increasingly weak and was unable to even stand up, so it was recommended to his transfer him
 to subacute rehab.  He was transferred to [facility] where he will receive his physical therapy a
nd dialysis in [facility].



DISCHARGE DIAGNOSES:

1.  Respiratory failure.

2.  Sepsis.

3.  Probable source was gallstone pancreatitis.

4.  End-stage renal disease.

5.  History of acute hepatic injury.





__________________________________________

Antonieta Rae MD







cc:



DD: 06/21/2017 18:48:39  [id number]

TT: 06/21/2017 22:42:51

Job # [id number]

jn

## 2017-08-29 NOTE — PN
DATE: 05/19/2017



Seen and examined at the bedside in ICU this morning.  The patient remains on 
ventilator, but he is a little bit more responsive, able to move his 
extremities and follows simple commands, at least attempted to squeeze the 
hands.



VITAL SIGNS:  Temperature is 98.7, respirations 28, 100 on the vent.



LABORATORY DATA:  Today, WBC is 67.5, H and H is 8.0 and 23.8, platelets 143.  
His PT yesterday was 14.2, INR is 1.31 and PTT 26.8.  Sodium 131, K is 4.4, BUN 
57, creatinine is 5.9.  His total bilirubin is 5.5, AST is 1193, ALT is 1328 
and alkaline phosphatase is 171.  He had a chest x-ray today and that shows ET 
tube and nasogastric tube in satisfactory position.  Ultrasound was done on 01/
05/2017 of the gallbladder and that did show gallstones, negative for Austin's 
sign, minimal amount of fluid in the PA, minimal thickening.  CBD measured 
between 3.4 and 4.4 mm, no dilatation, hepatomegaly.



PHYSICAL EXAMINATION:

HEENT:  Sclerae is icteric.

NECK:  Supple.

CARDIAC:  S1, S2.

LUNGS:  Sounds are clear.

ABDOMEN:  With bowel sounds, softly obese and distended, nontender.

EXTREMITIES:  Positive bilateral lower extremity edema.  The patient looks like 
he has generalized edema.



ASSESSMENT:  A 61-year-old male with end-stage renal disease on dialysis, 
hypertension, came with weakness and fatigue and had altered mental status, 
found to have severe sepsis, severe luekocytosis and in acute hepatic failure/
fulminant hepatitis.  It is currently unclear of etiology of the acute 
hepatitis.  He also had elevated troponins and probable pneumonia.



PLAN:  Awaiting transfer to [facility], continues to wait for a bed placement 
according to nursing staff.  Continue to trend the liver enzymes.  The patient 
is on dialysis.  He is on acetylcysteine, on DVT prophylaxis, getting lactulose
, on IV antibiotics of meropenem and is on Xifaxan. FU stool Cdiff. We will 
continue to follow closely as per ICU team.  The patient was seen and case 
discussed with Dr. Morales.





__________________________________________

Naya HINES







cc:   



DD: 05/19/2017 11:41:38  [id number]

TT: 05/19/2017 12:13:11

Confirmation # [id number]

Dictation # [id number]

cn

MTDD IV iron ok with Dr Barfield. Will contact patient when she is discharged home.    Call date: 9/1    Yesy Samaniego, PharmD, [facility]  [phone]  August 29, 2017

## 2018-06-20 ENCOUNTER — HOSPITAL ENCOUNTER (EMERGENCY)
Dept: HOSPITAL 42 - ED | Age: 63
LOS: 1 days | Discharge: ANOTHER HOSPITAL | End: 2018-06-21
Payer: MEDICARE

## 2018-06-20 VITALS — BODY MASS INDEX: 44.9 KG/M2

## 2018-06-20 DIAGNOSIS — F17.210: ICD-10-CM

## 2018-06-20 DIAGNOSIS — Z99.2: ICD-10-CM

## 2018-06-20 DIAGNOSIS — N49.2: Primary | ICD-10-CM

## 2018-06-20 DIAGNOSIS — B20: ICD-10-CM

## 2018-06-20 DIAGNOSIS — E11.22: ICD-10-CM

## 2018-06-20 DIAGNOSIS — Z79.4: ICD-10-CM

## 2018-06-20 DIAGNOSIS — I12.0: ICD-10-CM

## 2018-06-20 DIAGNOSIS — N18.6: ICD-10-CM

## 2018-06-20 LAB
ALBUMIN SERPL-MCNC: 4.3 G/DL (ref 3–4.8)
ALBUMIN/GLOB SERPL: 0.8 {RATIO} (ref 1.1–1.8)
ALT SERPL-CCNC: 20 U/L (ref 7–56)
AST SERPL-CCNC: 27 U/L (ref 17–59)
BASOPHILS # BLD AUTO: 0.01 K/MM3 (ref 0–2)
BASOPHILS NFR BLD: 0.1 % (ref 0–3)
BUN SERPL-MCNC: 33 MG/DL (ref 7–21)
CALCIUM SERPL-MCNC: 8.9 MG/DL (ref 8.4–10.5)
EOSINOPHIL # BLD: 0.1 10*3/UL (ref 0–0.7)
EOSINOPHIL NFR BLD: 0.4 % (ref 1.5–5)
ERYTHROCYTE [DISTWIDTH] IN BLOOD BY AUTOMATED COUNT: 16.7 % (ref 11.5–14.5)
GFR NON-AFRICAN AMERICAN: 10
GRANULOCYTES # BLD: 10.73 10*3/UL (ref 1.4–6.5)
GRANULOCYTES NFR BLD: 78.7 % (ref 50–68)
HGB BLD-MCNC: 10.9 G/DL (ref 14–18)
LYMPHOCYTES # BLD: 2.2 10*3/UL (ref 1.2–3.4)
LYMPHOCYTES NFR BLD AUTO: 16 % (ref 22–35)
MCH RBC QN AUTO: 27.7 PG (ref 25–35)
MCHC RBC AUTO-ENTMCNC: 32.1 G/DL (ref 31–37)
MCV RBC AUTO: 86.3 FL (ref 80–105)
MONOCYTES # BLD AUTO: 0.7 10*3/UL (ref 0.1–0.6)
MONOCYTES NFR BLD: 4.8 % (ref 1–6)
PLATELET # BLD: 175 10^3/UL (ref 120–450)
PMV BLD AUTO: 9.4 FL (ref 7–11)
RBC # BLD AUTO: 3.94 10^6/UL (ref 3.5–6.1)
WBC # BLD AUTO: 13.6 10^3/UL (ref 4.5–11)

## 2018-06-20 NOTE — ED PDOC
Arrival/HPI





<Reed Gonzalez - Last Filed: 06/20/18 21:31>





- General


Historian: Patient





- History of Present Illness


Time/Duration: < week


Symptom Onset: Gradual


Symptom Course: Unchanged (dizziness, LUQ x2 days), Worsening (left scrotal pain

)


Quality: Aching (LUQ pain), Throbbing (scrotal pain)





<Diego Barr - Last Filed: 06/21/18 01:31>





- General


Chief Complaint: Dizziness/Lightheaded


Time Seen by Provider: 06/20/18 18:59





- History of Present Illness


Narrative History of Present Illness (Text): 





06/20/18 19:24


This is a 61 yo AA M with PMH of HTN, IDDM, ESRD on dialysis, and HLD who 

presents with complaints of positional dizziness, LUQ abd pain, and left-sided 

scrotal pain x3-4 days.  Pt describes dizziness as occuring with position 

changes, predominantly sitting -> standing motions, occasionally accompanied 

with room-spinning and diaphoresis, but denies shortness of breath/chest pain/

nausea/emesis/syncope/near-syncope/vision changes.  Reports self-resolving 

within 30 seconds to 1 minute, but is concerned because they keep occurring.  

No association with his dialysis regimen (MWF, last underwent today, usually 4 

hour session), but does admit his last 2 sessions (including today), they have 

only been removing low volumes despite him not making urine due to, as per pt, 

not having a lot of excess volume (took off 1.2 L today).  Reports strict 

compliance with PO fluid restriction at home.  Also complains of LUQ abd pain, 

intermittent, not associated with food intake or PO intolerance, not associated 

with nausea or emesis.  Reports normal BMs up to 2 days ago, where he had 1 

episode of non-bloody diarrhea (partially loose stools, not watery), which 

improved the LUQ pain, but has been constipated since.  Feels a need to go, 

feels mildly distended, but reports unable to go; has not used any over the 

counter medications to attempt to resolve.  Tenderness is dull pain, mild 

severity (3-4/10), not radiating anywhere else.  Also complains of L-sided 

scrotal pain, progressively worsening over last 3 days, worse with self-

palpation and walking.  Reports that he observed what he though was a boil in 

that region 3 days prior, no longer able to visualize, denies any rashes in area

, denies manipulating or trying to pop the boil.  Denies fevers or chills, 

denies pyuria or bloody discharge from genitalia (has minimal urine output, 

"drops" daily as per pt).  All other ROS in 12-system review negative.  Never 

experienced symptoms like dizziness or scrotal pain before, has been dialysis 

pt for > 5 yrs.





PMH: as above


Surgical Hx: Carpal tunnel release, LUE AV fistula


Social Hx: Active tobacco user (currently down to 1/3rd ppd, previously 1/2ppd 

for > 40 yrs as per pt), social EtOH (denies any within last week, denies any 

binge episodes within last 4 weeks), denies illicits


Fam Hx: unknown





PMD: Dr. Maria





06/20/18 19:52


Addendum: During chart review, reported hx of HIV positive not on HAART found 

in history from 2017, testing Positive for HIV-1 and CD4 count < 400 as of 5/26/ 17.  Patient denies any knowledge of HIV history, reports not on any 

medications for it, unsure if he has ever been tested.


 (Diego Barr)


Modifying Factors (Text): 





06/20/18 19:56


Dizziness improved with rest (Diego Barr)





Past Medical History





- Provider Review


Nursing Documentation Reviewed: Yes





- Infectious Disease


Hx of Infectious Diseases: None





- Tetanus Immunization


Tetanus Immunization: Unknown





- Cardiac


Hx Cardiac Disorders: Yes


Hx Angina: Yes


Hx Cardiac Arrhythmia: Yes


Hx Hypertension: Yes


Hx Peripheral Edema: Yes (ble +1)


Hx Peripheral Vascular Disease: Yes





- Pulmonary


Hx Respiratory Disorders: No (smokes cigarettes)


Hx Asthma: No


Hx Chronic Obstructive Pulmonary Disease (COPD): Yes





- Neurological


Hx Neurological Disorder: No


Other/Comment: peripheral neuropathy





- HEENT


Hx HEENT Disorder: No





- Renal


Hx Renal Failure: Yes





- Endocrine/Metabolic


Hx Endocrine Disorders: Yes


Hx Diabetes Mellitus Type 1: Yes





- Hematological/Oncological


Hx Blood Disorders: Yes


Hx Anemia: Yes (BLOOD TRANSFUSION)





- Integumentary


Other/Comment: ble dry skin





- Musculoskeletal/Rheumatological


Hx Falls: Yes (pasr)





- Gastrointestinal


Hx Gastrointestinal Disorders: Yes


Hx Gastroesophageal Reflux: Yes





- Genitourinary/Gynecological


Hx Genitourinary Disorders: No





- Psychiatric


Hx Psychophysiologic Disorder: No


Hx Substance Use: No





- Past Surgical History


Past Surgical History: Non-Contributing





- Surgical History


Hx Cholecystectomy: No


Hx Coronary Stent: Yes (1 2017)


Hx Gastric Bypass Surgery: No


Hx Hysterectomy: No


Hx Joint Replacement: No


Hx Kidney Transplant: No


Hx Liver Transplant: No


Hx Mastectomy: No


Hx Musculoskeletal Surgery: No


Hx Open Heart Surgery: No


Hx Orthopedic Surgery: No


Hx Splenectomy: No


Hx Valve Replacement: No


Other/Comment: Dialysis shunt placement in left arm.





- Anesthesia


Hx Anesthesia: Yes


Hx Anesthesia Reactions: No


Hx Malignant Hyperthermia: No





- Suicidal Assessment


Feels Threatened In Home Enviroment: No





<Diego Barr - Last Filed: 06/21/18 01:31>





Family/Social History





- Physician Review


Nursing Documentation Reviewed: Yes


Family/Social History: No Known Family HX


Smoking Status: Light Smoker < 10 Cigarettes Daily


Hx Alcohol Use: No


Hx Substance Use: No


Hx Substance Use Treatment: No





<Diego Barr - Last Filed: 06/21/18 01:31>





Allergies/Home Meds





<Reed Gonzalez - Last Filed: 06/20/18 21:31>





<Diego Barr - Last Filed: 06/21/18 01:31>


Allergies/Adverse Reactions: 


Allergies





Penicillins Allergy (Verified 06/20/18 18:37)


 SWELLING








Home Medications: 


 Home Meds











 Medication  Instructions  Recorded  Confirmed


 


Mv,Devang,Min/Iron/Folic Acid/Lut 1 tab PO DAILY 07/16/16 06/20/18





[Complete Multi Tablet]   


 


amLODIPine [Norvasc] 10 mg PO DAILY 07/16/16 06/20/18


 


Colesevelam HCl [Welchol] 27 mg PO DAILY 07/28/16 06/20/18


 


Ferric Citrate [Auryxia] 210 mg PO TID 07/28/16 06/20/18


 


Ergocalciferol (Vitamin D2) 50,000 unit PO QD7 PRN 04/21/17 06/20/18





[Vitamin D2]   














Review of Systems





- Physician Review


All systems were reviewed & negative as marked: Yes (as per HPI)





- Review of Systems


Constitutional: Normal.  absent: Fatigue, Fevers, Night Sweats


Eyes: Normal.  absent: Vision Changes, Photophobia


ENT: Normal.  absent: Sore Throat, Rhinorrhea, Epistaxis


Respiratory: Normal.  absent: SOB, Cough


Cardiovascular: Normal.  absent: Chest Pain, Palpitations, BRUMFIELD, Syncope


Gastrointestinal: Abdominal Pain (LUQ), Constipation (for last 2 days), 

Diarrhea (x1 2 days prior, now constipated).  absent: Nausea, Vomiting, 

Appetite Changes, Hematochezia, Hematemesis, Food Intolerance


Genitourinary Male: Other (oliguric 2/2 ESRD, reports no changes/cloudiness/puss

/blood in few drops of urine daily).  absent: Normal


Musculoskeletal: absent: Neck Pain, Joint Swelling


Skin: Other (area of induration and tenderness to palpation at L-scrotum)


Neurological: Dizziness (with position changes, relieved with sitting or 

standing still, sell resolving within 30 seconds to 1 minute each episode).  

absent: Focal Weakness, Speech Changes, Facial Droop


Endocrine: Diaphoresis (concurrent with some episodes of dizziness, also self-

resolving)


Psychiatric: Normal





<Diego Barr - Last Filed: 06/21/18 01:31>





Physical Exam





<Reed Gonzalez - Last Filed: 06/20/18 21:31>


Vital Signs Reviewed: Yes


Temperature: Afebrile


Blood Pressure: Other (observed to be systolic 150's on arrival, 130's on 

recheck, concurrent with tachy to 100's on arrival and 80's on recheck)


Pulse: Regular (initally tachy on monitor at start of exam to 100's, 80's by 

end of exam and on recheck)


Appearance: Positive for: Well-Appearing, Non-Toxic, Comfortable (while as rest 

only, pain with movement or manipulation of left scrotum)


Pain Distress: Other (none at rest, minimal with LUQ palpation, moderate to 

severe with left scrotal palpation)


Mental Status: Positive for: Alert and Oriented X 3





- Systems Exam


Head: Present: Atraumatic, Normocephalic


Pupils: Present: PERRL.  No: Pinpoint


Extroacular Muscles: Present: EOMI.  No: Gaze Palsy, Entrapment


Conjunctiva: Present: Other (dirty sclera but not grossly icteric, mild 

conjunctival injection bilaterally).  No: Normal, Icteric


Mouth: Present: Moist Mucous Membranes, Normal Lips, Normal Tounge.  No: Dry, 

Drooling


Nose (External): Present: Atraumatic.  No: Abrasion, Laceration


Nose (Internal): Present: No Active Bleeding.  No: Epistaxis


Neck: Present: Normal Range of Motion, Trachea Midline.  No: JVD


Respiratory/Chest: Present: Clear to Auscultation, Good Air Exchange, Decreased 

Breath Sounds (mild decreased breath sounds all quadrants).  No: Respiratory 

Distress, Accessory Muscle Use, Rales, Rhonchi, Tachypneic, Tender to Palpation


Cardiovascular: Present: Regular Rate and Rhythm, Normal S1, S2, Peripheal 

Pulses Present (+2 radials, +1 dorsalis pedis, brisk pulse palpable and 

auscultory over LUE AV fistula).  No: Murmurs, Irregular Rhythm, Tachycardic, 

Bradycardic


Abdomen: Present: Tenderness (LUQ at most superior section overlying mid-

axilary line, no radiation or extension into left flank/L CVA region/epigastric 

region), Distention (pt reports sensation of distention but not grossly 

appreciable on exam, soft and easily compressible on exam with no appreciable 

discrete loops of bowel), Normal Bowel Sounds, Other (no spider angiomata).  No

: Guarding, Mass/Organomegaly


Genitourinary Male: Present: Other (region of left scrotal induration and 

exquiste tenderness to palpation, no appreciable fluctuance at region, both 

testicles palpable and discrete from this region, cremaster reflex intact 

bilaterally, no boils or surrounding rashes apparent, no erythema appreciated).

  No: Penile Swelling


Back: No: CVA Tenderness


Upper Extremity: Present: Normal Inspection (LUE AV fistula, otherwise normal 

inspection), Normal ROM, NORMAL PULSES.  No: Cyanosis, Edema, Tenderness, 

Swelling, Erythema


Lower Extremity: Present: Normal ROM.  No: Edema, CALF TENDERNESS, NORMAL 

PULSES (difficult to palpate +1 dorsalis pedis, unable to palpate posterior 

tibials), Tenderness, Swelling, Erythema


Neurological: Present: GCS=15, Speech Normal, Motor Func Grossly Intact, Normal 

Sensory Function


Skin: Present: Warm, Dry, Normal Color, Induration (as described in 

genitourinary exam).  No: Rashes, Diaphoretic, Erythematous, Hot, Cold, 

Laceration, Abrasion





<Diego Barr - Last Filed: 06/21/18 01:31>


Vital Signs











  Temp Pulse Resp BP Pulse Ox


 


 06/21/18 01:20   78  20  115/64  98


 


 06/20/18 21:45   95 H  20  118/60  97


 


 06/20/18 18:28  98.5 F  79  18  137/77  100














Medical Decision Making





<Reed Gonzalez - Last Filed: 06/20/18 21:31>


Reassessment Condition: Re-examined, Unchanged





<Diego Barr - Last Filed: 06/21/18 01:31>


ED Course and Treatment: 








06/20/18 20:30


62 year old male presents to the Emergency department for positional dizziness, 

left upper quadrant abdominal pain, and left-sided scrotal pain since 3-4 days. 


In agreement with resident note, which includes further HPI details. Patient 

was seen and evaluated with resident, came up with plan and treatment together. 

 (Reed Gonzalez)





06/20/18 20:09


Ddx: 


-Scrotal abscess in setting of HIV not on HAART, less likely testicular torsion 

given gradual onset, lack of N/V, ambulatory state, and bilateral cremaster 

reflex intact


-Orthostatic hypotension vs disequilibrium 2/2 dialysis


-LUQ pain 2/2 constipation vs collitis vs diverticulitis





-Given confirmed testing for HIV prior, need to rule out developing abscess, 

would require IV abx if present, CT abd/pelvis ordered


-CT will also allow assessment of colon to assess for acute diverticulitis vs 

colitis, likely persistence of pain due to constipation


-CBC, CMP, Mg, Phos to assess for acute infectious process or metabolic 

derangements contributing to dizziness


-Likely orthostatic hypotension due to occurrence with position changes, and 

low volume status 2/2 dialysis & fluid restriction.  Will await lab results 

before determining if patient needs some gentle hydration.  Will obtain 

orthostatic BPs after PT allowed to rest and return to baseline BP and HR (

given both elevated but downtrending on arrival).





-F/u and dispo





06/21/18 00:25


-Leukocytosis of 13.6 on labs, afebrile but still concerning in setting of HIV 

not on ART medications


-Blood cultures ordered, will empirically cover with Vanco 1g x1 IVPB


-CT obtained, concerning for thickening of L scrotal pole, pending official 

read by radiologist


-Due to cellulitis vs abscess in setting of HIV + not on HAART, needs IV abx, 

need admission


-Case discussed with Physician covering for PMD (Dr. Hardy covering for Dr. Maria), who requests transfer to either [facility] or [facility], as aware [facility] 

cannot accept pts requiring dialysis; 


   [facility] as workup already done

, and Transfer Center reports no beds





06/21/18 01:30


-Accepted by [facility] ED Physician (Dr. Womack) for ED-to-ED transfer as [facility] does 

not do direct admits, transportation arranged





Patient seen, reviewed, and discussed with attending, Dr. Gonzalez. (Diego Barr

)





- Lab Interpretations


Lab Results: 








 06/20/18 19:45 





 06/20/18 19:45 





 Lab Results





06/20/18 19:45: Sodium 136, Potassium 4.4, Chloride 93 L, Carbon Dioxide 26, 

Anion Gap 22 H, BUN 33 H, Creatinine 5.9 H, Est GFR ( Amer) 12, Est GFR (

Non-Af Amer) 10, Random Glucose 299 H, Calcium 8.9, Phosphorus 4.7 H, Magnesium 

2.0, Total Bilirubin 0.8, AST 27, ALT 20, Alkaline Phosphatase 118, Total 

Protein 9.5 H, Albumin 4.3, Globulin 5.2, Albumin/Globulin Ratio 0.8 L


06/20/18 19:45: WBC 13.6 H D, RBC 3.94, Hgb 10.9 L, Hct 34.0 L, MCV 86.3, MCH 

27.7, MCHC 32.1, RDW 16.7 H, Plt Count 175, MPV 9.4, Gran % 78.7 H, Lymph % (

Auto) 16.0 L, Mono % (Auto) 4.8, Eos % (Auto) 0.4 L, Baso % (Auto) 0.1, Gran # 

10.73 H, Lymph # (Auto) 2.2, Mono # (Auto) 0.7 H, Eos # (Auto) 0.1, Baso # (Auto

) 0.01


06/20/18 18:34: POC Glucose (mg/dL) 287 H











- RAD Interpretation


Radiology Orders: 








06/20/18 19:20


ABD & PELVIS W/O PO OR IV CONT [CT] Stat 














- Medication Orders


Current Medication Orders: 











Discontinued Medications





Vancomycin HCl (Vancomycin 1gm)  1 gm in 250 mls @ 167 mls/hr IVPB STAT STA


   PRN Reason: Protocol


   Stop: 06/21/18 00:29


   Last Admin: 06/21/18 00:11  Dose: 167 mls/hr





eMAR Start Stop


 Document     06/21/18 00:11  SS  (Rec: 06/21/18 00:18  SS  [id number])


     Intravenous Solution


      Start Date                                 06/21/18


      Start Time                                 00:18


      End Date                                   06/21/18


      End time                                   01:48


      Total Infusion Time                        90














- PA / NP / Resident Statement


MD/ has reviewed & agrees with the documentation as recorded.


MD/ has examined the patient and agrees with the treatment plan.





- Scribe Statement


The provider has reviewed the documentation as recorded by the Scribe





<Reed Gonzalez - Last Filed: 06/20/18 21:31>





<Diego Barr - Last Filed: 06/21/18 01:31>





- Scribe Statement


Jayda Howell.





All medical record entries made by the Scribe were at my direction and 

personally dictated by me. I have reviewed the chart and agree that the record 

accurately reflects my personal performance of the history, physical exam, 

medical decision making, and the department course for this patient. I have 

also personally directed, reviewed, and agree with the discharge instructions 

and disposition. (Reed Gonzalez)





Disposition/Present on Arrival





<Reed Gonzalez - Last Filed: 06/20/18 21:31>





- Present on Arrival


Any Indicators Present on Arrival: Yes


History of DVT/PE: Yes


History of Uncontrolled Diabetes: Yes


Urinary Catheter: No


History of Decub. Ulcer: No


History Surgical Site Infection Following: None





- Disposition


Have Diagnosis and Disposition been Completed?: Yes


Disposition Time: 01:30


Patient Plan: Transfer To ([facility])





<Diego Barr - Last Filed: 06/21/18 01:31>





- Disposition


Diagnosis: 


 Cellulitis of scrotum, HIV (human immunodeficiency virus infection), ESRD (end 

stage renal disease), Diabetes mellitus type 2





Disposition: Transfer [facility]


Patient Problems: 


 Current Active Problems











Problem Status Onset


 


Cellulitis of scrotum Acute  


 


Diabetes mellitus type 2 Acute  


 


ESRD (end stage renal disease) Acute  


 


HIV (human immunodeficiency virus infection) Acute  











Condition: FAIR


Discharge Instructions (ExitCare):  Cellulitis (ED), Human Immunodeficiency 

Virus and Acquired Immune Deficiency Syndrome (ED)


Referrals: 


Lavonne Maria MD [Primary Care Provider] - Follow up with primary


Forms:  [employer] (English)

## 2018-06-21 VITALS — HEART RATE: 87 BPM | SYSTOLIC BLOOD PRESSURE: 110 MMHG | DIASTOLIC BLOOD PRESSURE: 51 MMHG | RESPIRATION RATE: 18 BRPM

## 2018-06-21 VITALS — TEMPERATURE: 99 F

## 2018-06-21 VITALS — OXYGEN SATURATION: 96 %

## 2018-06-21 NOTE — CT
PROCEDURE:  CT Abdomen and Pelvis without intravenous contrast



HISTORY:

LUQ pain and L testicular pain



COMPARISON:

CT chest, abdomen and pelvis 5/20/2017



TECHNIQUE:

Without contrast.. 



Contrast dose: 0



Radiation dose:



Total exam DLP = 1295.45 mGy-cm.



This CT exam was performed using one or more of the following dose 

reduction techniques: Automated exposure control, adjustment of the 

mA and/or kV according to patient size, and/or use of iterative 

reconstruction technique.



FINDINGS:



LOWER THORAX:

Alveolar opacity in both lower lobes and in the visualized anterior 

segment right upper lobe.  Nonspecific.  Possible infectious or 

inflammatory etiology. No mass identified. Mild cardiomegaly. 



LIVER:

Unremarkable. No gross lesion or ductal dilatation.  



GALLBLADDER AND BILE DUCTS:

Unremarkable. 



PANCREAS:

Unremarkable. No gross lesion or ductal dilatation.



SPLEEN:

Unremarkable. 



ADRENALS:

Unremarkable. No mass. 



KIDNEYS AND URETERS:

Unremarkable. No hydronephrosis. No solid mass. 



VASCULATURE:

No evidence of abdominal aortic aneurysm.  Inferior vena caval filter 

noted. 



BOWEL:

Unremarkable. No obstruction. No gross mural thickening. 



APPENDIX:

Unremarkable. Normal appendix. 



PERITONEUM:

Unremarkable. No free fluid. No free air. 



LYMPH NODES:

Unremarkable. No enlarged lymph nodes. 



BLADDER:

Nondistended 



REPRODUCTIVE:

Normal prostate 



BONES:

No acute fracture. 



OTHER FINDINGS:

None.



IMPRESSION:

Unremarkable non contrast enhanced CT of the abdomen and pelvis.



Preliminary interpretation of this examination was reported by 

[facility] at time. There is concurrence of this report with 

the preliminary interpretation.

## 2018-06-21 NOTE — CARD
--------------- APPROVED REPORT --------------





EKG Measurement

Heart [id number]

LA [id number]

[id number]

[id number]

[id number]



<Conclusion>

Normal sinus rhythm

Left anterior fascicular block

Abnormal ECG